# Patient Record
Sex: FEMALE | Race: BLACK OR AFRICAN AMERICAN | NOT HISPANIC OR LATINO | URBAN - METROPOLITAN AREA
[De-identification: names, ages, dates, MRNs, and addresses within clinical notes are randomized per-mention and may not be internally consistent; named-entity substitution may affect disease eponyms.]

---

## 2017-05-04 ENCOUNTER — OUTPATIENT (OUTPATIENT)
Dept: OUTPATIENT SERVICES | Facility: HOSPITAL | Age: 77
LOS: 1 days | Discharge: HOME | End: 2017-05-04

## 2017-05-30 PROBLEM — Z00.00 ENCOUNTER FOR PREVENTIVE HEALTH EXAMINATION: Status: ACTIVE | Noted: 2017-05-30

## 2017-06-28 ENCOUNTER — OUTPATIENT (OUTPATIENT)
Dept: OUTPATIENT SERVICES | Facility: HOSPITAL | Age: 77
LOS: 1 days | Discharge: HOME | End: 2017-06-28

## 2017-06-28 DIAGNOSIS — I10 ESSENTIAL (PRIMARY) HYPERTENSION: ICD-10-CM

## 2017-06-28 DIAGNOSIS — I25.10 ATHEROSCLEROTIC HEART DISEASE OF NATIVE CORONARY ARTERY WITHOUT ANGINA PECTORIS: ICD-10-CM

## 2017-06-28 DIAGNOSIS — I50.9 HEART FAILURE, UNSPECIFIED: ICD-10-CM

## 2017-06-28 DIAGNOSIS — Z01.21 ENCOUNTER FOR DENTAL EXAMINATION AND CLEANING WITH ABNORMAL FINDINGS: ICD-10-CM

## 2017-06-30 DIAGNOSIS — K02.53 DENTAL CARIES ON PIT AND FISSURE SURFACE PENETRATING INTO PULP: ICD-10-CM

## 2017-07-05 ENCOUNTER — OUTPATIENT (OUTPATIENT)
Dept: OUTPATIENT SERVICES | Facility: HOSPITAL | Age: 77
LOS: 1 days | Discharge: HOME | End: 2017-07-05

## 2017-07-05 DIAGNOSIS — I50.9 HEART FAILURE, UNSPECIFIED: ICD-10-CM

## 2017-07-05 DIAGNOSIS — I10 ESSENTIAL (PRIMARY) HYPERTENSION: ICD-10-CM

## 2017-07-05 DIAGNOSIS — I25.10 ATHEROSCLEROTIC HEART DISEASE OF NATIVE CORONARY ARTERY WITHOUT ANGINA PECTORIS: ICD-10-CM

## 2018-02-10 ENCOUNTER — EMERGENCY (EMERGENCY)
Facility: HOSPITAL | Age: 78
LOS: 1 days | Discharge: LEFT BEFORE DISCHARGE | End: 2018-02-10
Attending: EMERGENCY MEDICINE | Admitting: INTERNAL MEDICINE

## 2018-02-10 VITALS
OXYGEN SATURATION: 95 % | TEMPERATURE: 100 F | HEART RATE: 70 BPM | DIASTOLIC BLOOD PRESSURE: 67 MMHG | RESPIRATION RATE: 18 BRPM | SYSTOLIC BLOOD PRESSURE: 102 MMHG

## 2018-02-10 VITALS
DIASTOLIC BLOOD PRESSURE: 67 MMHG | RESPIRATION RATE: 17 BRPM | OXYGEN SATURATION: 97 % | HEART RATE: 69 BPM | TEMPERATURE: 98 F | SYSTOLIC BLOOD PRESSURE: 133 MMHG

## 2018-02-10 DIAGNOSIS — E78.5 HYPERLIPIDEMIA, UNSPECIFIED: ICD-10-CM

## 2018-02-10 DIAGNOSIS — Z87.891 PERSONAL HISTORY OF NICOTINE DEPENDENCE: ICD-10-CM

## 2018-02-10 DIAGNOSIS — J11.1 INFLUENZA DUE TO UNIDENTIFIED INFLUENZA VIRUS WITH OTHER RESPIRATORY MANIFESTATIONS: ICD-10-CM

## 2018-02-10 DIAGNOSIS — R50.9 FEVER, UNSPECIFIED: ICD-10-CM

## 2018-02-10 DIAGNOSIS — I11.0 HYPERTENSIVE HEART DISEASE WITH HEART FAILURE: ICD-10-CM

## 2018-02-10 DIAGNOSIS — J44.9 CHRONIC OBSTRUCTIVE PULMONARY DISEASE, UNSPECIFIED: ICD-10-CM

## 2018-02-10 DIAGNOSIS — R42 DIZZINESS AND GIDDINESS: ICD-10-CM

## 2018-02-10 DIAGNOSIS — R19.7 DIARRHEA, UNSPECIFIED: ICD-10-CM

## 2018-02-10 DIAGNOSIS — I50.9 HEART FAILURE, UNSPECIFIED: ICD-10-CM

## 2018-02-10 DIAGNOSIS — E87.6 HYPOKALEMIA: ICD-10-CM

## 2018-02-10 LAB
ANION GAP SERPL CALC-SCNC: 9 MMOL/L — SIGNIFICANT CHANGE UP (ref 7–14)
APTT BLD: 33.6 SEC — SIGNIFICANT CHANGE UP (ref 27–39.2)
B-TYPE NATRIURETIC PEPTIDE BNP RESULT: 37 PG/ML — SIGNIFICANT CHANGE UP (ref 0–99)
BASOPHILS # BLD AUTO: 0.02 K/UL — SIGNIFICANT CHANGE UP (ref 0–0.2)
BASOPHILS NFR BLD AUTO: 0.8 % — SIGNIFICANT CHANGE UP (ref 0–1)
BUN SERPL-MCNC: 17 MG/DL — SIGNIFICANT CHANGE UP (ref 10–20)
CALCIUM SERPL-MCNC: 8.3 MG/DL — LOW (ref 8.5–10.1)
CHLORIDE SERPL-SCNC: 97 MMOL/L — LOW (ref 98–110)
CK MB CFR SERPL CALC: 2.4 NG/ML — SIGNIFICANT CHANGE UP (ref 0.6–6.3)
CO2 SERPL-SCNC: 28 MMOL/L — SIGNIFICANT CHANGE UP (ref 17–32)
CREAT SERPL-MCNC: 1.3 MG/DL — SIGNIFICANT CHANGE UP (ref 0.7–1.5)
EOSINOPHIL # BLD AUTO: 0.01 K/UL — SIGNIFICANT CHANGE UP (ref 0–0.7)
EOSINOPHIL NFR BLD AUTO: 0.4 % — SIGNIFICANT CHANGE UP (ref 0–8)
FLU A RESULT: NEGATIVE — SIGNIFICANT CHANGE UP
FLU A RESULT: NEGATIVE — SIGNIFICANT CHANGE UP
FLUAV AG NPH QL: NEGATIVE — SIGNIFICANT CHANGE UP
FLUBV AG NPH QL: POSITIVE
GLUCOSE SERPL-MCNC: 137 MG/DL — HIGH (ref 70–110)
HCT VFR BLD CALC: 32.1 % — LOW (ref 37–47)
HGB BLD-MCNC: 10.5 G/DL — LOW (ref 14–18)
IMM GRANULOCYTES NFR BLD AUTO: 0.4 % — HIGH (ref 0.1–0.3)
INR BLD: 1.04 RATIO — SIGNIFICANT CHANGE UP (ref 0.65–1.3)
LYMPHOCYTES # BLD AUTO: 1.26 K/UL — SIGNIFICANT CHANGE UP (ref 1.2–3.4)
LYMPHOCYTES # BLD AUTO: 51 % — SIGNIFICANT CHANGE UP (ref 20.5–51.1)
MCHC RBC-ENTMCNC: 29.3 PG — SIGNIFICANT CHANGE UP (ref 27–31)
MCHC RBC-ENTMCNC: 32.7 G/DL — SIGNIFICANT CHANGE UP (ref 32–37)
MCV RBC AUTO: 89.7 FL — SIGNIFICANT CHANGE UP (ref 81–91)
MONOCYTES # BLD AUTO: 0.43 K/UL — SIGNIFICANT CHANGE UP (ref 0.1–0.6)
MONOCYTES NFR BLD AUTO: 17.4 % — HIGH (ref 1.7–9.3)
NEUTROPHILS # BLD AUTO: 0.74 K/UL — LOW (ref 1.4–6.5)
NEUTROPHILS NFR BLD AUTO: 30 % — LOW (ref 42.2–75.2)
PLATELET # BLD AUTO: 153 K/UL — SIGNIFICANT CHANGE UP (ref 130–400)
POTASSIUM SERPL-MCNC: 2.8 MMOL/L — LOW (ref 3.5–5)
POTASSIUM SERPL-SCNC: 2.8 MMOL/L — LOW (ref 3.5–5)
PROTHROM AB SERPL-ACNC: 11.2 SEC — SIGNIFICANT CHANGE UP (ref 9.95–12.87)
RBC # BLD: 3.58 M/UL — LOW (ref 4.2–5.4)
RBC # FLD: 13.5 % — SIGNIFICANT CHANGE UP (ref 11.5–14.5)
SODIUM SERPL-SCNC: 134 MMOL/L — LOW (ref 135–146)
TROPONIN I SERPL-MCNC: 0.05 NG/ML — SIGNIFICANT CHANGE UP (ref 0–0.05)
WBC # BLD: 2.47 K/UL — LOW (ref 4.8–10.8)
WBC # FLD AUTO: 2.47 K/UL — LOW (ref 4.8–10.8)

## 2018-02-10 RX ORDER — IPRATROPIUM/ALBUTEROL SULFATE 18-103MCG
3 AEROSOL WITH ADAPTER (GRAM) INHALATION ONCE
Qty: 0 | Refills: 0 | Status: COMPLETED | OUTPATIENT
Start: 2018-02-10 | End: 2018-02-10

## 2018-02-10 RX ORDER — POTASSIUM CHLORIDE 20 MEQ
20 PACKET (EA) ORAL ONCE
Qty: 0 | Refills: 0 | Status: COMPLETED | OUTPATIENT
Start: 2018-02-10 | End: 2018-02-10

## 2018-02-10 RX ORDER — SODIUM CHLORIDE 9 MG/ML
1000 INJECTION INTRAMUSCULAR; INTRAVENOUS; SUBCUTANEOUS ONCE
Qty: 0 | Refills: 0 | Status: COMPLETED | OUTPATIENT
Start: 2018-02-10 | End: 2018-02-10

## 2018-02-10 RX ORDER — POTASSIUM CHLORIDE 20 MEQ
40 PACKET (EA) ORAL ONCE
Qty: 0 | Refills: 0 | Status: COMPLETED | OUTPATIENT
Start: 2018-02-10 | End: 2018-02-10

## 2018-02-10 RX ADMIN — Medication 40 MILLIEQUIVALENT(S): at 22:56

## 2018-02-10 RX ADMIN — Medication 75 MILLIGRAM(S): at 22:08

## 2018-02-10 RX ADMIN — Medication 3 MILLILITER(S): at 18:33

## 2018-02-10 NOTE — ED ADULT NURSE NOTE - PMH
CHF (congestive heart failure)    COPD (chronic obstructive pulmonary disease)    HTN (hypertension)

## 2018-02-10 NOTE — ED PROVIDER NOTE - PHYSICAL EXAMINATION
Vital signs reviewed  GENERAL: Patient well appearing, NAD  HEAD: NCAT  EYES: PERRL, EOMI  ENT: MMM  NECK: Supple, non tender  RESPIRATORY: B/L rhonchi. Normal respiratory effort. Speaking full sentences.   CARDIOVASCULAR: Regular rate and rhythm. Normal S1/S2. +murmur (pt states it is known)  ABDOMEN: Soft. Nondistended. Nontender. No guarding or rebound  MUSCULOSKELETAL/EXTREMITIES: Brisk cap refill. 2+ radial pulses. No leg edema.  SKIN:  Warm and dry. No acute rash.  NEURO: AAOx3. No gross FND.  PSYCHIATRIC: Cooperative. Affect appropriate. Insight and judgement normal. Memory intact. Thought normal.

## 2018-02-10 NOTE — ED PROVIDER NOTE - ATTENDING CONTRIBUTION TO CARE
I have personally performed a history and physical exam on this patient and personally directed the management of the patient. Pt is a 76 yo female with PMH of CHF, COPD, leaky valve (? which one?, HTN, high lipids, body aches, cough (productive of white phlegm), fever, loose stool over past 3 days. Felt a little dizzy earlier as well. Not aware of sick contacts, no travel. Has no pedal edema, has chronic orthopnea (since childhood). Denies CP, denies abdomen pain, calf pain, headache, neck pain, rash. No wheezing at home. On exam does have trace crackles at bases, otherwise as per resident note. Will check labs, ekg, xray, nebs, and reassess.

## 2018-02-10 NOTE — ED PROVIDER NOTE - NS ED ROS FT
Constitutional: +fever  Eyes:  No visual changes  ENMT:  No neck pain  Cardiac:  No chest pain, palpitations  Respiratory:  +cough, SOB  GI: +diarrhea. No nausea, vomiting, abdominal pain.  :  No dysuria, hematuria  MS: +myalgia. No back pain. No leg swelling  Neuro: +lightheadedness. No headache  Skin:  No skin rash  Endocrine: No history of thyroid disease or diabetes.  Except as documented in the HPI,  all other systems are negative.

## 2018-02-10 NOTE — ED PROVIDER NOTE - OBJECTIVE STATEMENT
76yo F with PMHx CHF, COPD, "leaky valve", HTN, HLD p/w body aches x6 days, and fever, chills, cough, lightheadedness x3 days. Intermittent SOB. Assoc with loose stools. No recent change in her lasix, denies leg swelling. No flu shot this year. No recent travel or sick contacts. Denies headache, CP, nausea, vomiting, abd pain, dysuria, hematuria, leg swelling.

## 2018-02-11 LAB
ALBUMIN SERPL ELPH-MCNC: 3.4 G/DL — SIGNIFICANT CHANGE UP (ref 3–5.5)
ALP SERPL-CCNC: 44 U/L — SIGNIFICANT CHANGE UP (ref 30–115)
ALT FLD-CCNC: 32 U/L — SIGNIFICANT CHANGE UP (ref 0–41)
AST SERPL-CCNC: 70 U/L — HIGH (ref 0–41)
BILIRUB DIRECT SERPL-MCNC: 0.5 MG/DL — HIGH (ref 0–0.2)
BILIRUB INDIRECT FLD-MCNC: 0.6 MG/DL — SIGNIFICANT CHANGE UP
BILIRUB SERPL-MCNC: 1.1 MG/DL — SIGNIFICANT CHANGE UP (ref 0.2–1.2)
PROT SERPL-MCNC: 7.2 G/DL — SIGNIFICANT CHANGE UP (ref 6–8)

## 2018-02-11 RX ORDER — POTASSIUM CHLORIDE 20 MEQ
40 PACKET (EA) ORAL ONCE
Qty: 0 | Refills: 0 | Status: DISCONTINUED | OUTPATIENT
Start: 2018-02-11 | End: 2018-02-16

## 2018-02-11 RX ADMIN — SODIUM CHLORIDE 1000 MILLILITER(S): 9 INJECTION INTRAMUSCULAR; INTRAVENOUS; SUBCUTANEOUS at 00:03

## 2018-02-11 RX ADMIN — Medication 50 MILLIEQUIVALENT(S): at 00:03

## 2018-02-14 ENCOUNTER — INPATIENT (INPATIENT)
Facility: HOSPITAL | Age: 78
LOS: 1 days | Discharge: HOME | End: 2018-02-16
Attending: INTERNAL MEDICINE | Admitting: INTERNAL MEDICINE

## 2018-02-14 VITALS
DIASTOLIC BLOOD PRESSURE: 94 MMHG | SYSTOLIC BLOOD PRESSURE: 154 MMHG | RESPIRATION RATE: 20 BRPM | TEMPERATURE: 98 F | OXYGEN SATURATION: 97 % | HEART RATE: 74 BPM

## 2018-02-14 DIAGNOSIS — Z98.890 OTHER SPECIFIED POSTPROCEDURAL STATES: Chronic | ICD-10-CM

## 2018-02-14 LAB
ALBUMIN SERPL ELPH-MCNC: 3.4 G/DL — SIGNIFICANT CHANGE UP (ref 3–5.5)
ALP SERPL-CCNC: 47 U/L — SIGNIFICANT CHANGE UP (ref 30–115)
ALT FLD-CCNC: 20 U/L — SIGNIFICANT CHANGE UP (ref 0–41)
ANION GAP SERPL CALC-SCNC: 11 MMOL/L — SIGNIFICANT CHANGE UP (ref 7–14)
APPEARANCE UR: (no result)
AST SERPL-CCNC: 52 U/L — HIGH (ref 0–41)
BACTERIA # UR AUTO: (no result) /HPF
BASOPHILS # BLD AUTO: 0.02 K/UL — SIGNIFICANT CHANGE UP (ref 0–0.2)
BASOPHILS NFR BLD AUTO: 0.3 % — SIGNIFICANT CHANGE UP (ref 0–1)
BILIRUB SERPL-MCNC: 1.5 MG/DL — HIGH (ref 0.2–1.2)
BILIRUB UR-MCNC: NEGATIVE — SIGNIFICANT CHANGE UP
BUN SERPL-MCNC: 8 MG/DL — LOW (ref 10–20)
CALCIUM SERPL-MCNC: 8.8 MG/DL — SIGNIFICANT CHANGE UP (ref 8.5–10.1)
CHLORIDE SERPL-SCNC: 104 MMOL/L — SIGNIFICANT CHANGE UP (ref 98–110)
CO2 SERPL-SCNC: 25 MMOL/L — SIGNIFICANT CHANGE UP (ref 17–32)
COLOR SPEC: YELLOW — SIGNIFICANT CHANGE UP
CREAT SERPL-MCNC: 0.9 MG/DL — SIGNIFICANT CHANGE UP (ref 0.7–1.5)
DIFF PNL FLD: NEGATIVE — SIGNIFICANT CHANGE UP
EOSINOPHIL # BLD AUTO: 0.03 K/UL — SIGNIFICANT CHANGE UP (ref 0–0.7)
EOSINOPHIL NFR BLD AUTO: 0.5 % — SIGNIFICANT CHANGE UP (ref 0–8)
EPI CELLS # UR: (no result) /HPF
GLUCOSE SERPL-MCNC: 96 MG/DL — SIGNIFICANT CHANGE UP (ref 70–110)
GLUCOSE UR QL: NEGATIVE MG/DL — SIGNIFICANT CHANGE UP
HCT VFR BLD CALC: 32.2 % — LOW (ref 37–47)
HGB BLD-MCNC: 10.1 G/DL — LOW (ref 14–18)
IMM GRANULOCYTES NFR BLD AUTO: 0.7 % — HIGH (ref 0.1–0.3)
KETONES UR-MCNC: NEGATIVE — SIGNIFICANT CHANGE UP
LACTATE SERPL-SCNC: 1.4 MMOL/L — SIGNIFICANT CHANGE UP (ref 0.5–2.2)
LEUKOCYTE ESTERASE UR-ACNC: NEGATIVE — SIGNIFICANT CHANGE UP
LYMPHOCYTES # BLD AUTO: 1.47 K/UL — SIGNIFICANT CHANGE UP (ref 1.2–3.4)
LYMPHOCYTES # BLD AUTO: 25 % — SIGNIFICANT CHANGE UP (ref 20.5–51.1)
MCHC RBC-ENTMCNC: 28.9 PG — SIGNIFICANT CHANGE UP (ref 27–31)
MCHC RBC-ENTMCNC: 31.4 G/DL — LOW (ref 32–37)
MCV RBC AUTO: 92 FL — HIGH (ref 81–91)
MONOCYTES # BLD AUTO: 0.53 K/UL — SIGNIFICANT CHANGE UP (ref 0.1–0.6)
MONOCYTES NFR BLD AUTO: 9 % — SIGNIFICANT CHANGE UP (ref 1.7–9.3)
NEUTROPHILS # BLD AUTO: 3.79 K/UL — SIGNIFICANT CHANGE UP (ref 1.4–6.5)
NEUTROPHILS NFR BLD AUTO: 64.5 % — SIGNIFICANT CHANGE UP (ref 42.2–75.2)
NITRITE UR-MCNC: NEGATIVE — SIGNIFICANT CHANGE UP
NRBC # BLD: 0 /100 WBCS — SIGNIFICANT CHANGE UP (ref 0–0)
PH UR: 6 — SIGNIFICANT CHANGE UP (ref 5–8)
PLATELET # BLD AUTO: 230 K/UL — SIGNIFICANT CHANGE UP (ref 130–400)
POTASSIUM SERPL-MCNC: 5.2 MMOL/L — HIGH (ref 3.5–5)
POTASSIUM SERPL-SCNC: 5.2 MMOL/L — HIGH (ref 3.5–5)
PROT SERPL-MCNC: 6.7 G/DL — SIGNIFICANT CHANGE UP (ref 6–8)
PROT UR-MCNC: 30 MG/DL
RBC # BLD: 3.5 M/UL — LOW (ref 4.2–5.4)
RBC # FLD: 14.3 % — SIGNIFICANT CHANGE UP (ref 11.5–14.5)
SODIUM SERPL-SCNC: 140 MMOL/L — SIGNIFICANT CHANGE UP (ref 135–146)
SP GR SPEC: 1.01 — SIGNIFICANT CHANGE UP (ref 1.01–1.03)
UROBILINOGEN FLD QL: 1 MG/DL (ref 0.2–0.2)
WBC # BLD: 5.88 K/UL — SIGNIFICANT CHANGE UP (ref 4.8–10.8)
WBC # FLD AUTO: 5.88 K/UL — SIGNIFICANT CHANGE UP (ref 4.8–10.8)
WBC UR QL: SIGNIFICANT CHANGE UP /HPF

## 2018-02-14 RX ORDER — AZITHROMYCIN 500 MG/1
500 TABLET, FILM COATED ORAL EVERY 24 HOURS
Qty: 0 | Refills: 0 | Status: DISCONTINUED | OUTPATIENT
Start: 2018-02-14 | End: 2018-02-16

## 2018-02-14 RX ORDER — CEFTRIAXONE 500 MG/1
1 INJECTION, POWDER, FOR SOLUTION INTRAMUSCULAR; INTRAVENOUS EVERY 24 HOURS
Qty: 0 | Refills: 0 | Status: DISCONTINUED | OUTPATIENT
Start: 2018-02-14 | End: 2018-02-16

## 2018-02-14 RX ORDER — AMLODIPINE BESYLATE 2.5 MG/1
5 TABLET ORAL DAILY
Qty: 0 | Refills: 0 | Status: DISCONTINUED | OUTPATIENT
Start: 2018-02-14 | End: 2018-02-16

## 2018-02-14 RX ORDER — IPRATROPIUM/ALBUTEROL SULFATE 18-103MCG
3 AEROSOL WITH ADAPTER (GRAM) INHALATION EVERY 4 HOURS
Qty: 0 | Refills: 0 | Status: DISCONTINUED | OUTPATIENT
Start: 2018-02-14 | End: 2018-02-16

## 2018-02-14 RX ORDER — AZITHROMYCIN 500 MG/1
500 TABLET, FILM COATED ORAL ONCE
Qty: 0 | Refills: 0 | Status: COMPLETED | OUTPATIENT
Start: 2018-02-14 | End: 2018-02-14

## 2018-02-14 RX ORDER — METOPROLOL TARTRATE 50 MG
25 TABLET ORAL
Qty: 0 | Refills: 0 | Status: DISCONTINUED | OUTPATIENT
Start: 2018-02-14 | End: 2018-02-16

## 2018-02-14 RX ORDER — CEFTRIAXONE 500 MG/1
1 INJECTION, POWDER, FOR SOLUTION INTRAMUSCULAR; INTRAVENOUS ONCE
Qty: 0 | Refills: 0 | Status: COMPLETED | OUTPATIENT
Start: 2018-02-14 | End: 2018-02-14

## 2018-02-14 RX ORDER — SODIUM CHLORIDE 9 MG/ML
3 INJECTION INTRAMUSCULAR; INTRAVENOUS; SUBCUTANEOUS
Qty: 3 | Refills: 0 | OUTPATIENT
Start: 2018-02-14 | End: 2018-02-20

## 2018-02-14 RX ORDER — LEVOTHYROXINE SODIUM 125 MCG
25 TABLET ORAL DAILY
Qty: 0 | Refills: 0 | Status: DISCONTINUED | OUTPATIENT
Start: 2018-02-14 | End: 2018-02-16

## 2018-02-14 RX ORDER — SODIUM CHLORIDE 9 MG/ML
1000 INJECTION INTRAMUSCULAR; INTRAVENOUS; SUBCUTANEOUS
Qty: 0 | Refills: 0 | Status: DISCONTINUED | OUTPATIENT
Start: 2018-02-14 | End: 2018-02-16

## 2018-02-14 RX ORDER — SODIUM CHLORIDE 9 MG/ML
3 INJECTION INTRAMUSCULAR; INTRAVENOUS; SUBCUTANEOUS ONCE
Qty: 0 | Refills: 0 | Status: COMPLETED | OUTPATIENT
Start: 2018-02-14 | End: 2018-02-14

## 2018-02-14 RX ORDER — ENOXAPARIN SODIUM 100 MG/ML
40 INJECTION SUBCUTANEOUS EVERY 24 HOURS
Qty: 0 | Refills: 0 | Status: DISCONTINUED | OUTPATIENT
Start: 2018-02-14 | End: 2018-02-15

## 2018-02-14 RX ORDER — ALBUTEROL 90 UG/1
2.5 AEROSOL, METERED ORAL EVERY 6 HOURS
Qty: 0 | Refills: 0 | Status: DISCONTINUED | OUTPATIENT
Start: 2018-02-14 | End: 2018-02-16

## 2018-02-14 RX ORDER — FUROSEMIDE 40 MG
20 TABLET ORAL DAILY
Qty: 0 | Refills: 0 | Status: DISCONTINUED | OUTPATIENT
Start: 2018-02-14 | End: 2018-02-16

## 2018-02-14 RX ORDER — BUDESONIDE AND FORMOTEROL FUMARATE DIHYDRATE 160; 4.5 UG/1; UG/1
2 AEROSOL RESPIRATORY (INHALATION)
Qty: 0 | Refills: 0 | Status: DISCONTINUED | OUTPATIENT
Start: 2018-02-14 | End: 2018-02-16

## 2018-02-14 RX ORDER — ATORVASTATIN CALCIUM 80 MG/1
20 TABLET, FILM COATED ORAL AT BEDTIME
Qty: 0 | Refills: 0 | Status: DISCONTINUED | OUTPATIENT
Start: 2018-02-14 | End: 2018-02-16

## 2018-02-14 RX ORDER — ENOXAPARIN SODIUM 100 MG/ML
40 INJECTION SUBCUTANEOUS EVERY 24 HOURS
Qty: 0 | Refills: 0 | Status: DISCONTINUED | OUTPATIENT
Start: 2018-02-14 | End: 2018-02-16

## 2018-02-14 RX ORDER — SODIUM CHLORIDE 9 MG/ML
500 INJECTION INTRAMUSCULAR; INTRAVENOUS; SUBCUTANEOUS
Qty: 0 | Refills: 0 | Status: DISCONTINUED | OUTPATIENT
Start: 2018-02-14 | End: 2018-02-14

## 2018-02-14 RX ADMIN — CEFTRIAXONE 100 GRAM(S): 500 INJECTION, POWDER, FOR SOLUTION INTRAMUSCULAR; INTRAVENOUS at 18:27

## 2018-02-14 RX ADMIN — SODIUM CHLORIDE 500 MILLILITER(S): 9 INJECTION INTRAMUSCULAR; INTRAVENOUS; SUBCUTANEOUS at 22:07

## 2018-02-14 RX ADMIN — AZITHROMYCIN 255 MILLIGRAM(S): 500 TABLET, FILM COATED ORAL at 19:30

## 2018-02-14 RX ADMIN — SODIUM CHLORIDE 2000 MILLILITER(S): 9 INJECTION INTRAMUSCULAR; INTRAVENOUS; SUBCUTANEOUS at 18:28

## 2018-02-14 RX ADMIN — SODIUM CHLORIDE 3 MILLILITER(S): 9 INJECTION INTRAMUSCULAR; INTRAVENOUS; SUBCUTANEOUS at 18:28

## 2018-02-14 NOTE — H&P ADULT - NSHPPHYSICALEXAM_GEN_ALL_CORE
Constitutional: non-toxic,  not in acute distress, but appears anxious.   HEENT: eomi,  wnl  Respiratory:  breath sounds b/l;  scattered crackles and wheezing b/l   Cardiovascular:  s1, s2,  regular rate, +systolic murmur.   Gastrointestinal:  nontender, nondistended, +bowel sounds  Extremities: no edema b/l le  Neurological: nonfocal; wnl, aaox3

## 2018-02-14 NOTE — ED PROVIDER NOTE - OBJECTIVE STATEMENT
77 female here for feeling odd sensations today, states felt like she was unable to control herself, noticed it after arising to go to the bathroom last night.     Has been recently diagnosed by [facility] ED with influenza via swab.  Has been taking tamiflu as directed and albuterol 3x/day.     No new fevers cough chills URI symptoms.

## 2018-02-14 NOTE — H&P ADULT - HISTORY OF PRESENT ILLNESS
anxiety attack  mm aches. runny nose, dx with flu recently in ed,  d/c'ed with tamiflu. took 1 day of tamiflu at home.   came back bc.    sob,      no chest pain / palpitations / n /v / stomach pain (excpet for gas).        productive white phlem. (nonbloody).    ros: dry mouth.  fevers 77y F w pmh listed and recently in ed with muscle aches / runny nose and found to be influenza positive and d/c'ed with tamiflu,  pw acute feeling of sob / anxiety.  Pt is still having productive cough (white sputum), dry mouth,  subjective fevers at home.   Denied any chest pain / palpitations / n /v / stomach pain (excpet for gas).

## 2018-02-14 NOTE — H&P ADULT - ATTENDING COMMENTS
PATIENT SEEN AND EXAMINED INDEPENDENTLY AGREE WITH PLAN OF RESIDENT.  VITAL SIGNS (Last 24 hrs):  T(C): 36.3 (02-15-18 @ 08:29), Max: 36.6 (02-15-18 @ 01:39)  HR: 57 (02-15-18 @ 08:29) (57 - 85)  BP: 130/77 (02-15-18 @ 08:29) (119/81 - 169/94)  RR: 18 (02-15-18 @ 08:29) (18 - 19)  SpO2: 97% (02-15-18 @ 08:29) (96% - 97%)  Wt(kg): --  Daily     Daily     I&O's Summary                        9.2    5.38  )-----------( 228      ( 15 Feb 2018 08:09 )             28.7   02-15    137  |  101  |  5<L>  ----------------------------<  141<H>  3.2<L>   |  28  |  0.8    Ca    8.4<L>      15 Feb 2018 08:09  Phos  3.4     02-15  Mg     1.7     02-15    TPro  6.0  /  Alb  3.2  /  TBili  0.7  /  DBili  x   /  AST  22  /  ALT  21  /  AlkPhos  45  02-15  cxr- INFILTRATE ON RT LL.  ON EXAM- aaoX3  CHEST-B/L CLEAR  CVS-S1 AND S2 CLEAR  ABDOMEN-SOFT,BS+  no edema  Assessment and Plan-  FLu positive on feb 10th and probably has completed tamiflu.  Pneumonia- patient feels ok will repeat CXR and 1 more day of iv antibiotics as she is a heart patient.  Severe MR- planning surgery.

## 2018-02-14 NOTE — H&P ADULT - PMH
CHF (congestive heart failure)    COPD (chronic obstructive pulmonary disease)    HTN (hypertension)    Severe mitral regurgitation

## 2018-02-14 NOTE — H&P ADULT - ASSESSMENT
77y female with PMH listed pw cc worsening sob / cough / feeling of anxiety.  Found to have RLL PNA.     PLAN :    RLL PNA / COPD EXACERBATION  -- o2, nebs, steroids (taper upon improvement)  -- check bcx, sputum cx  -- check tte (low suspicion of pe)  -- iv abx:  rocephin + azithromycin  -- cw 5 day of tamiflu (4d left)      SEVERE MITRAL REGURGITATION  -- pt with plans to have mvr as o/p.        HTN / HF (NOT IN EXACERBATION)  --  cw home regimen      DVT PPX  DISPO: home 77y female with PMH listed pw cc worsening sob / cough / feeling of anxiety.  Found to have RLL PNA.     PLAN :    RLL PNA / COPD EXACERBATION  -- o2, nebs, steroids (taper upon improvement)  -- check bcx, sputum cx  -- check ddimer (low suspicion of pe)  -- iv abx:  rocephin + azithromycin  -- cw 5 day of tamiflu (4d left)      SEVERE MITRAL REGURGITATION  -- pt with plans to have mvr as o/p.        HTN / HF (NOT IN EXACERBATION)  --  cw home regimen      DVT PPX  DISPO: home 77y female with PMH listed pw cc worsening sob / cough / feeling of anxiety.  Found to have RLL PNA.     PLAN :    RLL PNA / COPD EXACERBATION  -- o2, nebs, steroids (taper upon improvement)  -- check bcx, sputum cx  -- check ddimer (low suspicion of pe)  -- iv abx:  rocephin + azithromycin  -- cw 5 day of tamiflu (4d left)      HYPERKALEMIA (5.2,  HEMOLYZED SAMPLE)  --  recheck bmp in am.   --  if nonhemolyzed sample elevated,  hold ace      SEVERE MITRAL REGURGITATION  -- pt with plans to have mvr as o/p.        HTN / HF (NOT IN EXACERBATION)  --  cw home regimen      DVT PPX  DISPO: home

## 2018-02-15 LAB
ALBUMIN SERPL ELPH-MCNC: 3.2 G/DL — SIGNIFICANT CHANGE UP (ref 3–5.5)
ALP SERPL-CCNC: 45 U/L — SIGNIFICANT CHANGE UP (ref 30–115)
ALT FLD-CCNC: 21 U/L — SIGNIFICANT CHANGE UP (ref 0–41)
ANION GAP SERPL CALC-SCNC: 8 MMOL/L — SIGNIFICANT CHANGE UP (ref 7–14)
AST SERPL-CCNC: 22 U/L — SIGNIFICANT CHANGE UP (ref 0–41)
BASOPHILS # BLD AUTO: 0.03 K/UL — SIGNIFICANT CHANGE UP (ref 0–0.2)
BASOPHILS NFR BLD AUTO: 0.6 % — SIGNIFICANT CHANGE UP (ref 0–1)
BILIRUB SERPL-MCNC: 0.7 MG/DL — SIGNIFICANT CHANGE UP (ref 0.2–1.2)
BUN SERPL-MCNC: 5 MG/DL — LOW (ref 10–20)
CALCIUM SERPL-MCNC: 8.4 MG/DL — LOW (ref 8.5–10.1)
CHLORIDE SERPL-SCNC: 101 MMOL/L — SIGNIFICANT CHANGE UP (ref 98–110)
CO2 SERPL-SCNC: 28 MMOL/L — SIGNIFICANT CHANGE UP (ref 17–32)
CREAT SERPL-MCNC: 0.8 MG/DL — SIGNIFICANT CHANGE UP (ref 0.7–1.5)
D DIMER BLD IA.RAPID-MCNC: 423 NG/ML DDU — HIGH (ref 0–230)
EOSINOPHIL # BLD AUTO: 0.13 K/UL — SIGNIFICANT CHANGE UP (ref 0–0.7)
EOSINOPHIL NFR BLD AUTO: 2.4 % — SIGNIFICANT CHANGE UP (ref 0–8)
GLUCOSE SERPL-MCNC: 141 MG/DL — HIGH (ref 70–110)
HCT VFR BLD CALC: 28.7 % — LOW (ref 37–47)
HGB BLD-MCNC: 9.2 G/DL — LOW (ref 14–18)
IMM GRANULOCYTES NFR BLD AUTO: 0.4 % — HIGH (ref 0.1–0.3)
LACTATE SERPL-SCNC: 0.8 MMOL/L — SIGNIFICANT CHANGE UP (ref 0.5–2.2)
LYMPHOCYTES # BLD AUTO: 1.72 K/UL — SIGNIFICANT CHANGE UP (ref 1.2–3.4)
LYMPHOCYTES # BLD AUTO: 32 % — SIGNIFICANT CHANGE UP (ref 20.5–51.1)
MAGNESIUM SERPL-MCNC: 1.7 MG/DL — LOW (ref 1.8–2.4)
MCHC RBC-ENTMCNC: 29.6 PG — SIGNIFICANT CHANGE UP (ref 27–31)
MCHC RBC-ENTMCNC: 32.1 G/DL — SIGNIFICANT CHANGE UP (ref 32–37)
MCV RBC AUTO: 92.3 FL — HIGH (ref 81–91)
MONOCYTES # BLD AUTO: 0.65 K/UL — HIGH (ref 0.1–0.6)
MONOCYTES NFR BLD AUTO: 12.1 % — HIGH (ref 1.7–9.3)
NEUTROPHILS # BLD AUTO: 2.83 K/UL — SIGNIFICANT CHANGE UP (ref 1.4–6.5)
NEUTROPHILS NFR BLD AUTO: 52.5 % — SIGNIFICANT CHANGE UP (ref 42.2–75.2)
PHOSPHATE SERPL-MCNC: 3.4 MG/DL — SIGNIFICANT CHANGE UP (ref 2.1–4.9)
PLATELET # BLD AUTO: 228 K/UL — SIGNIFICANT CHANGE UP (ref 130–400)
POTASSIUM SERPL-MCNC: 3.2 MMOL/L — LOW (ref 3.5–5)
POTASSIUM SERPL-SCNC: 3.2 MMOL/L — LOW (ref 3.5–5)
PROT SERPL-MCNC: 6 G/DL — SIGNIFICANT CHANGE UP (ref 6–8)
RBC # BLD: 3.11 M/UL — LOW (ref 4.2–5.4)
RBC # FLD: 14.4 % — SIGNIFICANT CHANGE UP (ref 11.5–14.5)
SODIUM SERPL-SCNC: 137 MMOL/L — SIGNIFICANT CHANGE UP (ref 135–146)
WBC # BLD: 5.38 K/UL — SIGNIFICANT CHANGE UP (ref 4.8–10.8)
WBC # FLD AUTO: 5.38 K/UL — SIGNIFICANT CHANGE UP (ref 4.8–10.8)

## 2018-02-15 RX ORDER — POTASSIUM CHLORIDE 20 MEQ
40 PACKET (EA) ORAL ONCE
Qty: 0 | Refills: 0 | Status: COMPLETED | OUTPATIENT
Start: 2018-02-15 | End: 2018-02-15

## 2018-02-15 RX ORDER — MAGNESIUM SULFATE 500 MG/ML
1 VIAL (ML) INJECTION ONCE
Qty: 0 | Refills: 0 | Status: COMPLETED | OUTPATIENT
Start: 2018-02-15 | End: 2018-02-15

## 2018-02-15 RX ADMIN — Medication 40 MILLIEQUIVALENT(S): at 20:58

## 2018-02-15 RX ADMIN — CEFTRIAXONE 100 GRAM(S): 500 INJECTION, POWDER, FOR SOLUTION INTRAMUSCULAR; INTRAVENOUS at 11:12

## 2018-02-15 RX ADMIN — Medication 25 MILLIGRAM(S): at 07:21

## 2018-02-15 RX ADMIN — AZITHROMYCIN 255 MILLIGRAM(S): 500 TABLET, FILM COATED ORAL at 07:22

## 2018-02-15 RX ADMIN — AMLODIPINE BESYLATE 5 MILLIGRAM(S): 2.5 TABLET ORAL at 07:19

## 2018-02-15 RX ADMIN — Medication 3 MILLILITER(S): at 18:29

## 2018-02-15 RX ADMIN — ALBUTEROL 2.5 MILLIGRAM(S): 90 AEROSOL, METERED ORAL at 11:15

## 2018-02-15 RX ADMIN — Medication 3 MILLILITER(S): at 05:20

## 2018-02-15 RX ADMIN — ENOXAPARIN SODIUM 40 MILLIGRAM(S): 100 INJECTION SUBCUTANEOUS at 07:18

## 2018-02-15 RX ADMIN — Medication 75 MILLIGRAM(S): at 18:28

## 2018-02-15 RX ADMIN — Medication 3 MILLILITER(S): at 11:14

## 2018-02-15 RX ADMIN — Medication 25 MILLIGRAM(S): at 18:28

## 2018-02-15 RX ADMIN — Medication 50 MILLIGRAM(S): at 07:20

## 2018-02-15 RX ADMIN — ALBUTEROL 2.5 MILLIGRAM(S): 90 AEROSOL, METERED ORAL at 18:30

## 2018-02-15 RX ADMIN — Medication 100 GRAM(S): at 21:28

## 2018-02-15 RX ADMIN — Medication 25 MICROGRAM(S): at 07:20

## 2018-02-15 RX ADMIN — Medication 75 MILLIGRAM(S): at 07:20

## 2018-02-15 RX ADMIN — Medication 20 MILLIGRAM(S): at 07:19

## 2018-02-15 RX ADMIN — ATORVASTATIN CALCIUM 20 MILLIGRAM(S): 80 TABLET, FILM COATED ORAL at 21:28

## 2018-02-15 NOTE — PROGRESS NOTE ADULT - SUBJECTIVE AND OBJECTIVE BOX
SCOTTY HANNON, female, 77y (40),   MRN-[id number]  Admit Date: 18 (1d)    Chief Complaint  Patient is a 77y old  Female who presents with a chief complaint of SOB (2018 22:09)      Past Medical and Surgical History  PAST MEDICAL & SURGICAL HISTORY:  Severe mitral regurgitation  COPD (chronic obstructive pulmonary disease)  CHF (congestive heart failure)  HTN (hypertension)  History of cholecystectomy      Current Medications:  MEDICATIONS  (STANDING):  ALBUTerol    0.083% 2.5 milliGRAM(s) Nebulizer every 6 hours  ALBUTerol/ipratropium for Nebulization 3 milliLiter(s) Nebulizer every 4 hours  amLODIPine   Tablet 5 milliGRAM(s) Oral daily  atorvastatin 20 milliGRAM(s) Oral at bedtime  azithromycin  IVPB 500 milliGRAM(s) IV Intermittent every 24 hours  buDESOnide  80 MICROgram(s)/formoterol 4.5 MICROgram(s) Inhaler 2 Puff(s) Inhalation two times a day  cefTRIAXone   IVPB 1 Gram(s) IV Intermittent every 24 hours  enoxaparin Injectable 40 milliGRAM(s) SubCutaneous every 24 hours  furosemide    Tablet 20 milliGRAM(s) Oral daily  levothyroxine 25 MICROGram(s) Oral daily  metoprolol     tartrate 25 milliGRAM(s) Oral two times a day  oseltamivir 75 milliGRAM(s) Oral two times a day  potassium chloride    Tablet ER 40 milliEquivalent(s) Oral once  sodium chloride 0.9%. 1000 milliLiter(s) (500 mL/Hr) IV Continuous <Continuous>    MEDICATIONS  (PRN):      Interval History:  No acute events overnight. No complaints at this time.    Vital Signs:  T(F): 97.3 (02-15-18 @ 08:29), Max: 97.9 (18 @ 13:20)  HR: 57 (02-15-18 @ 08:29) (57 - 85)  BP: 130/77 (02-15-18 @ 08:29) (119/81 - 169/94)  RR: 18 (02-15-18 @ 08:29) (18 - 20)  SpO2: 97% (02-15-18 @ 08:29) (96% - 97%)T(F): 97.3 (02-15-18 @ 08:29), Max: 97.8 (02-15-18 @ 01:39)  HR: 57 (02-15-18 @ 08:29) (57 - 85)  BP: 130/77 (02-15-18 @ 08:29) (119/81 - 169/94)  RR: 18 (02-15-18 @ 08:29) (18 - 19)  SpO2: 97% (02-15-18 @ 08:29) (96% - 97%)  CAPILLARY BLOOD GLUCOSE          Physical Exam:  General: Not in distress.   HEENT: Moist mucus membranes. PERRLA.  Cardio: Regular rate and rhythm, S1, S2, no murmur, rub, or gallop.  Pulm: Rales bilaterally  Abdomen: Soft, non-tender, non-distended. Normoactive bowel sounds  Extremities: No cyanosis or edema bilaterally. No calf tenderness to palpation  Neuro: A&O x3. No focal deficits. CN II - XII grossly intact.    Labs and Imaging:  CBC Full  -  ( 15 Feb 2018 08:09 )  WBC Count : 5.38 K/uL  Hemoglobin : 9.2 g/dL  Hematocrit : 28.7 %  Platelet Count - Automated : 228 K/uL  Mean Cell Volume : 92.3 fL  Mean Cell Hemoglobin : 29.6 pg  Mean Cell Hemoglobin Concentration : 32.1 g/dL  Auto Neutrophil # : 2.83 K/uL  Auto Lymphocyte # : 1.72 K/uL  Auto Monocyte # : 0.65 K/uL  Auto Eosinophil # : 0.13 K/uL  Auto Basophil # : 0.03 K/uL  Auto Neutrophil % : 52.5 %  Auto Lymphocyte % : 32.0 %  Auto Monocyte % : 12.1 %  Auto Eosinophil % : 2.4 %  Auto Basophil % : 0.6 %    RDW: 14.4  14.3      BMP: 02-15-18 @ 08:09  137 | 101 | 5    -----------------< 141  3.2  | 28 | 0.8  eGFR(AA): 81, eGFR (non-AA): 70  Ca 8.4, Mg 1.7, P 3.4  BMP: 18 @ 17:12  140 | 104 | 8    -----------------< 96  5.2  | 25 | 0.9  eGFR(AA): 73, eGFR (non-AA): 63  Ca 8.8, Mg --, P --    LFTs: 02-15-18 @ 08:09  TP  6.0  | 3.2 Alb   ---------------  TB  0.7  | --  DB   ---------------  ALT 21   | 22  AST          ^        45          ALK  LFTs: 18 @ 17:12  TP  6.7  | 3.4 Alb   ---------------  TB  1.5  | --  DB   ---------------  ALT 20   | 52  AST          ^        47          ALK      LFTs: 02-15-18 @ 08:09  Ca  8.4  | 22 AST   -----------------  TP  6.0  | 21 ALT  -----------------  Alb 3.2  | 45 ALK          ^        0.7         TB  LFTs: 18 @ 17:12  Ca  8.8  | 52 AST   -----------------  TP  6.7  | 20 ALT  -----------------  Alb 3.4  | 47 ALK          ^        1.5         TB      Cardiac Enzymes:    Urinalysis:  Urinalysis Basic - ( 2018 17:12 )    Color: Yellow / Appearance: Cloudy / S.015 / pH: x  Gluc: x / Ketone: Negative  / Bili: Negative / Urobili: 1.0 mg/dL   Blood: x / Protein: 30 mg/dL / Nitrite: Negative   Leuk Esterase: Negative / RBC: x / WBC 1-2 /HPF   Sq Epi: x / Non Sq Epi: Moderate /HPF / Bacteria: Few /HPF      Cultures:      Home Medications:  Home Medications:  albuterol 2.5 mg/3 mL (0.083%) inhalation solution: 3 milliliter(s) inhaled every 2 hours, As Needed (2018 16:12)  AMLODIPINE   TAB 5MG:  (2018 22:04)  ATORVASTATIN TAB 20MG:  (2018 22:04)  ENALAPRIL    TAB 10MG:  (2018 22:04)  furosemide 20 mg oral tablet: 20 milligram(s) orally once a day (2018 16:12)  LEVOTHYROXIN TAB 25MCG:  (2018 22:04)  metoprolol tartrate 25 mg oral tablet: 1 tab(s) orally 2 times a day (2018 16:12)      Assessment:  77y female with PMH listed presented for .  Found to have Pneumonia  Influenza      Plan:  Pneumonia  Influenza      Gilberto Peter MD.  Date/Time: 02-15-18 @ 14:42 SCOTTY HANNON, female, 77y (40),   MRN-[id number]  Admit Date: 18 (1d)    Chief Complaint  Patient is a 77y old  Female who presents with a chief complaint of SOB (2018 22:09)      Past Medical and Surgical History  PAST MEDICAL & SURGICAL HISTORY:  Severe mitral regurgitation  COPD (chronic obstructive pulmonary disease)  CHF (congestive heart failure)  HTN (hypertension)  History of cholecystectomy      Current Medications:  MEDICATIONS  (STANDING):  ALBUTerol    0.083% 2.5 milliGRAM(s) Nebulizer every 6 hours  ALBUTerol/ipratropium for Nebulization 3 milliLiter(s) Nebulizer every 4 hours  amLODIPine   Tablet 5 milliGRAM(s) Oral daily  atorvastatin 20 milliGRAM(s) Oral at bedtime  azithromycin  IVPB 500 milliGRAM(s) IV Intermittent every 24 hours  buDESOnide  80 MICROgram(s)/formoterol 4.5 MICROgram(s) Inhaler 2 Puff(s) Inhalation two times a day  cefTRIAXone   IVPB 1 Gram(s) IV Intermittent every 24 hours  enoxaparin Injectable 40 milliGRAM(s) SubCutaneous every 24 hours  furosemide    Tablet 20 milliGRAM(s) Oral daily  levothyroxine 25 MICROGram(s) Oral daily  metoprolol     tartrate 25 milliGRAM(s) Oral two times a day  oseltamivir 75 milliGRAM(s) Oral two times a day  potassium chloride    Tablet ER 40 milliEquivalent(s) Oral once  sodium chloride 0.9%. 1000 milliLiter(s) (500 mL/Hr) IV Continuous <Continuous>    MEDICATIONS  (PRN):      Interval History:  No acute events overnight. No complaints at this time.    Vital Signs:  T(F): 97.3 (02-15-18 @ 08:29), Max: 97.9 (18 @ 13:20)  HR: 57 (02-15-18 @ 08:29) (57 - 85)  BP: 130/77 (02-15-18 @ 08:29) (119/81 - 169/94)  RR: 18 (02-15-18 @ 08:29) (18 - 20)  SpO2: 97% (02-15-18 @ 08:29) (96% - 97%)T(F): 97.3 (02-15-18 @ 08:29), Max: 97.8 (02-15-18 @ 01:39)  HR: 57 (02-15-18 @ 08:29) (57 - 85)  BP: 130/77 (02-15-18 @ 08:29) (119/81 - 169/94)  RR: 18 (02-15-18 @ 08:29) (18 - 19)  SpO2: 97% (02-15-18 @ 08:29) (96% - 97%)  CAPILLARY BLOOD GLUCOSE          Physical Exam:  General: Not in distress.   HEENT: Moist mucus membranes. PERRLA.  Cardio: Regular rate and rhythm, S1, S2, no murmur, rub, or gallop.  Pulm: Rales bilaterally  Abdomen: Soft, non-tender, non-distended. Normoactive bowel sounds  Extremities: No cyanosis or edema bilaterally. No calf tenderness to palpation  Neuro: A&O x3. No focal deficits. CN II - XII grossly intact.    Labs and Imaging:  CBC Full  -  ( 15 Feb 2018 08:09 )  WBC Count : 5.38 K/uL  Hemoglobin : 9.2 g/dL  Hematocrit : 28.7 %  Platelet Count - Automated : 228 K/uL  Mean Cell Volume : 92.3 fL  Mean Cell Hemoglobin : 29.6 pg  Mean Cell Hemoglobin Concentration : 32.1 g/dL  Auto Neutrophil # : 2.83 K/uL  Auto Lymphocyte # : 1.72 K/uL  Auto Monocyte # : 0.65 K/uL  Auto Eosinophil # : 0.13 K/uL  Auto Basophil # : 0.03 K/uL  Auto Neutrophil % : 52.5 %  Auto Lymphocyte % : 32.0 %  Auto Monocyte % : 12.1 %  Auto Eosinophil % : 2.4 %  Auto Basophil % : 0.6 %    RDW: 14.4  14.3      BMP: 02-15-18 @ 08:09  137 | 101 | 5    -----------------< 141  3.2  | 28 | 0.8  eGFR(AA): 81, eGFR (non-AA): 70  Ca 8.4, Mg 1.7, P 3.4  BMP: 18 @ 17:12  140 | 104 | 8    -----------------< 96  5.2  | 25 | 0.9  eGFR(AA): 73, eGFR (non-AA): 63  Ca 8.8, Mg --, P --    LFTs: 02-15-18 @ 08:09  TP  6.0  | 3.2 Alb   ---------------  TB  0.7  | --  DB   ---------------  ALT 21   | 22  AST          ^        45          ALK  LFTs: 18 @ 17:12  TP  6.7  | 3.4 Alb   ---------------  TB  1.5  | --  DB   ---------------  ALT 20   | 52  AST          ^        47          ALK      LFTs: 02-15-18 @ 08:09  Ca  8.4  | 22 AST   -----------------  TP  6.0  | 21 ALT  -----------------  Alb 3.2  | 45 ALK          ^        0.7         TB  LFTs: 18 @ 17:12  Ca  8.8  | 52 AST   -----------------  TP  6.7  | 20 ALT  -----------------  Alb 3.4  | 47 ALK          ^        1.5         TB          Urin< from: Xray Chest 2 Views PA/Lat (18 @ 16:56) >  New right lower lobe opacity consistent with pneumonia in the proper   clinical setting    < end of copied text >  alysis:  Urinalysis Basic - ( 2018 17:12 )    Color: Yellow / Appearance: Cloudy / S.015 / pH: x  Gluc: x / Ketone: Negative  / Bili: Negative / Urobili: 1.0 mg/dL   Blood: x / Protein: 30 mg/dL / Nitrite: Negative   Leuk Esterase: Negative / RBC: x / WBC 1-2 /HPF   Sq Epi: x / Non Sq Epi: Moderate /HPF / Bacteria: Few /HPF      Cultures:      Home Medications:  Home Medications:  albuterol 2.5 mg/3 mL (0.083%) inhalation solution: 3 milliliter(s) inhaled every 2 hours, As Needed (2018 16:12)  AMLODIPINE   TAB 5MG:  (2018 22:04)  ATORVASTATIN TAB 20MG:  (2018 22:04)  ENALAPRIL    TAB 10MG:  (2018 22:04)  furosemide 20 mg oral tablet: 20 milligram(s) orally once a day (2018 16:12)  LEVOTHYROXIN TAB 25MCG:  (2018 22:04)  metoprolol tartrate 25 mg oral tablet: 1 tab(s) orally 2 times a day (2018 16:12)      Assessment:  77y female with PMH listed presented for .  Found to have Pneumonia  Influenza      Plan:  Pneumonia  Influenza      Gilberto Peter MD.  Date/Time: 02-15-18 @ 14:42

## 2018-02-15 NOTE — PROGRESS NOTE ADULT - ASSESSMENT
77y female with PMH listed pw cc worsening sob / cough / feeling of anxiety.  Found to have RLL PNA.     PLAN :    RLL PNA with Influenza B positive  -- o2, nebs, steroids stopped since pt is saturating well.  -- check bcx, sputum cx  -- ddimer mildly elevated likely due to infection  -- iv abx:  rocephin + azithromycin  -- cw 5 day of tamiflu (3d left)      Hypokalemia  --  recheck bmp in am.   --  repleted orally       SEVERE MITRAL REGURGITATION  -- pt with plans to have mvr as o/p.        HTN / HF (NOT IN EXACERBATION)  --  cw home regimen      DVT PPX  DISPO: home tomorrow in morning

## 2018-02-16 ENCOUNTER — TRANSCRIPTION ENCOUNTER (OUTPATIENT)
Age: 78
End: 2018-02-16

## 2018-02-16 VITALS
TEMPERATURE: 97 F | RESPIRATION RATE: 18 BRPM | DIASTOLIC BLOOD PRESSURE: 76 MMHG | SYSTOLIC BLOOD PRESSURE: 158 MMHG | OXYGEN SATURATION: 98 % | HEART RATE: 68 BPM

## 2018-02-16 LAB
ANION GAP SERPL CALC-SCNC: 7 MMOL/L — SIGNIFICANT CHANGE UP (ref 7–14)
BUN SERPL-MCNC: 5 MG/DL — LOW (ref 10–20)
CALCIUM SERPL-MCNC: 8.7 MG/DL — SIGNIFICANT CHANGE UP (ref 8.5–10.1)
CHLORIDE SERPL-SCNC: 105 MMOL/L — SIGNIFICANT CHANGE UP (ref 98–110)
CO2 SERPL-SCNC: 29 MMOL/L — SIGNIFICANT CHANGE UP (ref 17–32)
CREAT SERPL-MCNC: 0.8 MG/DL — SIGNIFICANT CHANGE UP (ref 0.7–1.5)
CULTURE RESULTS: SIGNIFICANT CHANGE UP
GLUCOSE SERPL-MCNC: 93 MG/DL — SIGNIFICANT CHANGE UP (ref 70–110)
HCT VFR BLD CALC: 29.3 % — LOW (ref 37–47)
HGB BLD-MCNC: 9.3 G/DL — LOW (ref 14–18)
MAGNESIUM SERPL-MCNC: 2.2 MG/DL — SIGNIFICANT CHANGE UP (ref 1.8–2.4)
MCHC RBC-ENTMCNC: 29.1 PG — SIGNIFICANT CHANGE UP (ref 27–31)
MCHC RBC-ENTMCNC: 31.7 G/DL — LOW (ref 32–37)
MCV RBC AUTO: 91.6 FL — HIGH (ref 81–91)
NRBC # BLD: 0 /100 WBCS — SIGNIFICANT CHANGE UP (ref 0–0)
PLATELET # BLD AUTO: 242 K/UL — SIGNIFICANT CHANGE UP (ref 130–400)
POTASSIUM SERPL-MCNC: 3.6 MMOL/L — SIGNIFICANT CHANGE UP (ref 3.5–5)
POTASSIUM SERPL-SCNC: 3.6 MMOL/L — SIGNIFICANT CHANGE UP (ref 3.5–5)
RBC # BLD: 3.2 M/UL — LOW (ref 4.2–5.4)
RBC # FLD: 14.2 % — SIGNIFICANT CHANGE UP (ref 11.5–14.5)
SODIUM SERPL-SCNC: 141 MMOL/L — SIGNIFICANT CHANGE UP (ref 135–146)
SPECIMEN SOURCE: SIGNIFICANT CHANGE UP
WBC # BLD: 8.15 K/UL — SIGNIFICANT CHANGE UP (ref 4.8–10.8)
WBC # FLD AUTO: 8.15 K/UL — SIGNIFICANT CHANGE UP (ref 4.8–10.8)

## 2018-02-16 RX ORDER — CIPROFLOXACIN LACTATE 400MG/40ML
1 VIAL (ML) INTRAVENOUS
Qty: 3 | Refills: 0
Start: 2018-02-16 | End: 2018-02-18

## 2018-02-16 RX ADMIN — ENOXAPARIN SODIUM 40 MILLIGRAM(S): 100 INJECTION SUBCUTANEOUS at 11:09

## 2018-02-16 RX ADMIN — Medication 75 MILLIGRAM(S): at 05:38

## 2018-02-16 RX ADMIN — ALBUTEROL 2.5 MILLIGRAM(S): 90 AEROSOL, METERED ORAL at 08:04

## 2018-02-16 RX ADMIN — Medication 3 MILLILITER(S): at 08:04

## 2018-02-16 RX ADMIN — Medication 20 MILLIGRAM(S): at 05:37

## 2018-02-16 RX ADMIN — Medication 25 MILLIGRAM(S): at 05:38

## 2018-02-16 RX ADMIN — BUDESONIDE AND FORMOTEROL FUMARATE DIHYDRATE 2 PUFF(S): 160; 4.5 AEROSOL RESPIRATORY (INHALATION) at 11:17

## 2018-02-16 RX ADMIN — Medication 25 MICROGRAM(S): at 05:37

## 2018-02-16 RX ADMIN — CEFTRIAXONE 100 GRAM(S): 500 INJECTION, POWDER, FOR SOLUTION INTRAMUSCULAR; INTRAVENOUS at 11:09

## 2018-02-16 RX ADMIN — AMLODIPINE BESYLATE 5 MILLIGRAM(S): 2.5 TABLET ORAL at 05:37

## 2018-02-16 NOTE — DISCHARGE NOTE ADULT - CARE PLAN
Principal Discharge DX:	Pneumonia of right lower lobe due to infectious organism  Goal:	resolution of infection  Assessment and plan of treatment:	Complete antibiotic course.  Secondary Diagnosis:	Influenza  Goal:	resolution of infection  Assessment and plan of treatment:	Complete antiviral course

## 2018-02-16 NOTE — DISCHARGE NOTE ADULT - HOSPITAL COURSE
77y female with PMH listed pw cc worsening sob / cough / feeling of anxiety.  Found to have RLL PNA. Blood cultures are negative. She feels much better. Patient is safe for discharge on oral antibiotics and one more day of tamiflu

## 2018-02-16 NOTE — PROGRESS NOTE ADULT - ASSESSMENT
77y female with PMH listed pw cc worsening sob / cough / feeling of anxiety.  Found to have RLL PNA.     PLAN :    RLL PNA with Influenza B positive  -- o2, nebs, steroids stopped since pt is saturating well.  -- Blood cultures are negative  -- ddimer mildly elevated likely due to infection  -- iv abx:  rocephin + azithromycin  -- cw 5 day of tamiflu (3d left)  --will discharge patient on oral antibiotics      Hypokalemia  --  repleted orally       SEVERE MITRAL REGURGITATION  -- pt with plans to have mvr as o/p.        HTN / HF (NOT IN EXACERBATION)  --  cw home regimen      DVT PPX  DISPO: home tomorrow in morning 77y female with PMH listed pw cc worsening sob / cough / feeling of anxiety.  Found to have RLL PNA.     PLAN :    RLL PNA with Influenza B positive  -- o2, nebs, steroids stopped since pt is saturating well.  -- Blood cultures are negative  -- ddimer mildly elevated likely due to infection  -- iv abx:  rocephin + azithromycin  -- tamiflu course completed  --will discharge patient on oral antibiotics      Hypokalemia  --  repleted orally       SEVERE MITRAL REGURGITATION  -- pt with plans to have mvr as o/p.        HTN / HF (NOT IN EXACERBATION)  --  cw home regimen      DVT PPX  DISPO: home today

## 2018-02-16 NOTE — DISCHARGE NOTE ADULT - PATIENT PORTAL LINK FT
You can access the [facility] Patient Portal, offered by [facility], by registering with the following website: [email or web address]

## 2018-02-16 NOTE — DISCHARGE NOTE ADULT - CARE PROVIDER_API CALL
Jas Renner), Internal Medicine  [address]  [address]  [place] [address]  Phone: [phone]  Fax: [phone]

## 2018-02-16 NOTE — PROGRESS NOTE ADULT - SUBJECTIVE AND OBJECTIVE BOX
SCOTTY HANNON, female, 77y (40),   MRN-[id number]  Admit Date: 18 (2d)    Chief Complaint  Patient is a 77y old  Female who presents with a chief complaint of SOB (2018 22:09)      Past Medical and Surgical History  PAST MEDICAL & SURGICAL HISTORY:  Severe mitral regurgitation  COPD (chronic obstructive pulmonary disease)  CHF (congestive heart failure)  HTN (hypertension)  History of cholecystectomy      Current Medications:  MEDICATIONS  (STANDING):  ALBUTerol    0.083% 2.5 milliGRAM(s) Nebulizer every 6 hours  ALBUTerol/ipratropium for Nebulization 3 milliLiter(s) Nebulizer every 4 hours  amLODIPine   Tablet 5 milliGRAM(s) Oral daily  atorvastatin 20 milliGRAM(s) Oral at bedtime  azithromycin  IVPB 500 milliGRAM(s) IV Intermittent every 24 hours  buDESOnide  80 MICROgram(s)/formoterol 4.5 MICROgram(s) Inhaler 2 Puff(s) Inhalation two times a day  cefTRIAXone   IVPB 1 Gram(s) IV Intermittent every 24 hours  enoxaparin Injectable 40 milliGRAM(s) SubCutaneous every 24 hours  furosemide    Tablet 20 milliGRAM(s) Oral daily  levothyroxine 25 MICROGram(s) Oral daily  metoprolol     tartrate 25 milliGRAM(s) Oral two times a day  oseltamivir 75 milliGRAM(s) Oral two times a day    Interval History:  No acute events overnight. No complaints at this time.    Vital Signs:  T(F): 97 (18 @ 08:41), Max: 98.1 (02-15-18 @ 16:08)  HR: 58 (18 @ 08:41) (57 - 85)  BP: 132/70 (18 @ 08:41) (119/81 - 169/94)  RR: 18 (18 @ 08:41) (18 - 20)  SpO2: 98% (18 @ 08:41) (96% - 98%)T(F): 97 (18 @ 08:41), Max: 98.1 (02-15-18 @ 16:08)  HR: 58 (18 @ 08:41) (58 - 66)  BP: 132/70 (02-16-18 @ 08:41) (132/70 - 168/79)  RR: 18 (18 @ 08:41) (18 - 18)  SpO2: 98% (18 @ 08:41) (96% - 98%)  CAPILLARY BLOOD GLUCOSE          Physical Exam:  General: Not in distress.   HEENT: Moist mucus membranes. PERRLA.  Cardio: Regular rate and rhythm, S1, S2, Mild pansystolic murmur  Pulm: Mild rales bilaterally  Abdomen: Soft, non-tender, non-distended. Normoactive bowel sounds  Extremities: No cyanosis or edema bilaterally. No calf tenderness to palpation  Neuro: A&O x3. No focal deficits. CN II - XII grossly intact.    Labs and Imaging:  CBC Full  -  ( 15 Feb 2018 08:09 )  WBC Count : 5.38 K/uL  Hemoglobin : 9.2 g/dL  Hematocrit : 28.7 %  Platelet Count - Automated : 228 K/uL  Mean Cell Volume : 92.3 fL  Mean Cell Hemoglobin : 29.6 pg  Mean Cell Hemoglobin Concentration : 32.1 g/dL  Auto Neutrophil # : 2.83 K/uL  Auto Lymphocyte # : 1.72 K/uL  Auto Monocyte # : 0.65 K/uL  Auto Eosinophil # : 0.13 K/uL  Auto Basophil # : 0.03 K/uL  Auto Neutrophil % : 52.5 %  Auto Lymphocyte % : 32.0 %  Auto Monocyte % : 12.1 %  Auto Eosinophil % : 2.4 %  Auto Basophil % : 0.6 %    RDW:       LFTs: 02-15-18 @ 08:09  TP  6.0  | 3.2 Alb   ---------------  TB  0.7  | --  DB   ---------------  ALT 21   | 22  AST          ^        45          ALK  LFTs: 18 @ 17:12  TP  6.7  | 3.4 Alb   ---------------  TB  1.5  | --  DB   ---------------  ALT 20   | 52  AST          ^        47          ALK            Urinalysis:  Urinalysis Basic - ( 2018 17:12 )    Color: Yellow / Appearance: Cloudy / S.015 / pH: x  Gluc: x / Ketone: Negative  / Bili: Negative / Urobili: 1.0 mg/dL   Blood: x / Protein: 30 mg/dL / Nitrite: Negative   Leuk Esterase: Negative / RBC: x / WBC 1-2 /HPF   Sq Epi: x / Non Sq Epi: Moderate /HPF / Bacteria: Few /HPF      Cultures:     (collected 18 @ 17:12)  Source: .Blood Blood-Peripheral  Preliminary Report:    No growth to date.        Home Medications:  Home Medications:  albuterol 2.5 mg/3 mL (0.083%) inhalation solution: 3 milliliter(s) inhaled every 2 hours, As Needed (2018 16:12)  AMLODIPINE   TAB 5MG:  (2018 22:04)  ATORVASTATIN TAB 20MG:  (2018 22:04)  ENALAPRIL    TAB 10MG:  (2018 22:04)  furosemide 20 mg oral tablet: 20 milligram(s) orally once a day (2018 16:12)  LEVOTHYROXIN TAB 25MCG:  (2018 22:04)  metoprolol tartrate 25 mg oral tablet: 1 tab(s) orally 2 times a day (2018 16:12)      Assessment:  77y female with PMH listed presented for .  Found to have Pneumonia  Influenza      Plan:  Pneumonia  Influenza      Gilberto Peter MD.  Date/Time: 18 @ 09:06

## 2018-02-16 NOTE — DISCHARGE NOTE ADULT - MEDICATION SUMMARY - MEDICATIONS TO TAKE
I will START or STAY ON the medications listed below when I get home from the hospital:    ENALAPRIL    TAB 10MG  -- Indication: For Htn    ATORVASTATIN TAB 20MG  -- Indication: For dld    oseltamivir 75 mg oral capsule  -- 1 cap(s) by mouth 2 times a day   -- Check with your doctor before becoming pregnant.  Finish all this medication unless otherwise directed by prescriber.    -- Indication: For Influenza    metoprolol tartrate 25 mg oral tablet  -- 1 tab(s) by mouth 2 times a day  -- Indication: For Htn    albuterol 2.5 mg/3 mL (0.083%) inhalation solution  -- 3 milliliter(s) inhaled every 2 hours, As Needed  -- Indication: For asthma    AMLODIPINE   TAB 5MG  -- Indication: For Htn    furosemide 20 mg oral tablet  -- 20 milligram(s) by mouth once a day  -- Indication: For Severe mitral regurgitation    Levaquin 500 mg oral tablet  -- 1 tab(s) by mouth once a day   -- Avoid prolonged or excessive exposure to direct and/or artificial sunlight while taking this medication.  Do not take dairy products, antacids, or iron preparations within one hour of this medication.  Finish all this medication unless otherwise directed by prescriber.  May cause drowsiness or dizziness.  Medication should be taken with plenty of water.    -- Indication: For PNEUMONIA    LEVOTHYROXIN TAB 25MCG  -- Indication: For Hypothyroidism

## 2018-02-20 DIAGNOSIS — J18.9 PNEUMONIA, UNSPECIFIED ORGANISM: ICD-10-CM

## 2018-02-20 DIAGNOSIS — Z87.891 PERSONAL HISTORY OF NICOTINE DEPENDENCE: ICD-10-CM

## 2018-02-20 DIAGNOSIS — J10.00 INFLUENZA DUE TO OTHER IDENTIFIED INFLUENZA VIRUS WITH UNSPECIFIED TYPE OF PNEUMONIA: ICD-10-CM

## 2018-02-20 DIAGNOSIS — I34.0 NONRHEUMATIC MITRAL (VALVE) INSUFFICIENCY: ICD-10-CM

## 2018-02-20 DIAGNOSIS — J44.9 CHRONIC OBSTRUCTIVE PULMONARY DISEASE, UNSPECIFIED: ICD-10-CM

## 2018-02-20 DIAGNOSIS — E87.6 HYPOKALEMIA: ICD-10-CM

## 2018-02-20 DIAGNOSIS — I11.0 HYPERTENSIVE HEART DISEASE WITH HEART FAILURE: ICD-10-CM

## 2018-02-20 DIAGNOSIS — I50.9 HEART FAILURE, UNSPECIFIED: ICD-10-CM

## 2018-02-20 LAB
CULTURE RESULTS: SIGNIFICANT CHANGE UP
CULTURE RESULTS: SIGNIFICANT CHANGE UP
SPECIMEN SOURCE: SIGNIFICANT CHANGE UP
SPECIMEN SOURCE: SIGNIFICANT CHANGE UP

## 2018-07-09 ENCOUNTER — APPOINTMENT (OUTPATIENT)
Dept: OBGYN | Facility: CLINIC | Age: 78
End: 2018-07-09

## 2018-08-19 ENCOUNTER — INPATIENT (INPATIENT)
Facility: HOSPITAL | Age: 78
LOS: 2 days | Discharge: HOME | End: 2018-08-22
Attending: HOSPITALIST | Admitting: HOSPITALIST

## 2018-08-19 VITALS
DIASTOLIC BLOOD PRESSURE: 62 MMHG | SYSTOLIC BLOOD PRESSURE: 121 MMHG | OXYGEN SATURATION: 97 % | HEART RATE: 80 BPM | RESPIRATION RATE: 18 BRPM | TEMPERATURE: 100 F

## 2018-08-19 DIAGNOSIS — Z98.890 OTHER SPECIFIED POSTPROCEDURAL STATES: Chronic | ICD-10-CM

## 2018-08-19 PROBLEM — J44.9 CHRONIC OBSTRUCTIVE PULMONARY DISEASE, UNSPECIFIED: Chronic | Status: ACTIVE | Noted: 2018-02-10

## 2018-08-19 PROBLEM — I50.9 HEART FAILURE, UNSPECIFIED: Chronic | Status: ACTIVE | Noted: 2018-02-10

## 2018-08-19 PROBLEM — I10 ESSENTIAL (PRIMARY) HYPERTENSION: Chronic | Status: ACTIVE | Noted: 2018-02-10

## 2018-08-19 PROBLEM — I34.0 NONRHEUMATIC MITRAL (VALVE) INSUFFICIENCY: Chronic | Status: ACTIVE | Noted: 2018-02-14

## 2018-08-19 LAB
ABO RH CONFIRMATION: SIGNIFICANT CHANGE UP
ALBUMIN SERPL ELPH-MCNC: 3.9 G/DL — SIGNIFICANT CHANGE UP (ref 3.5–5.2)
ALP SERPL-CCNC: 53 U/L — SIGNIFICANT CHANGE UP (ref 30–115)
ALT FLD-CCNC: 12 U/L — SIGNIFICANT CHANGE UP (ref 0–41)
ANION GAP SERPL CALC-SCNC: 15 MMOL/L — HIGH (ref 7–14)
APTT BLD: 32.7 SEC — SIGNIFICANT CHANGE UP (ref 27–39.2)
AST SERPL-CCNC: 24 U/L — SIGNIFICANT CHANGE UP (ref 0–41)
BASE EXCESS BLDV CALC-SCNC: 2.8 MMOL/L — HIGH (ref -2–2)
BASOPHILS # BLD AUTO: 0.03 K/UL — SIGNIFICANT CHANGE UP (ref 0–0.2)
BASOPHILS NFR BLD AUTO: 0.4 % — SIGNIFICANT CHANGE UP (ref 0–1)
BILIRUB SERPL-MCNC: 0.3 MG/DL — SIGNIFICANT CHANGE UP (ref 0.2–1.2)
BLD GP AB SCN SERPL QL: SIGNIFICANT CHANGE UP
BUN SERPL-MCNC: 12 MG/DL — SIGNIFICANT CHANGE UP (ref 10–20)
CA-I SERPL-SCNC: 1.17 MMOL/L — SIGNIFICANT CHANGE UP (ref 1.12–1.3)
CALCIUM SERPL-MCNC: 8.7 MG/DL — SIGNIFICANT CHANGE UP (ref 8.5–10.1)
CHLORIDE SERPL-SCNC: 99 MMOL/L — SIGNIFICANT CHANGE UP (ref 98–110)
CO2 SERPL-SCNC: 24 MMOL/L — SIGNIFICANT CHANGE UP (ref 17–32)
CREAT SERPL-MCNC: 1 MG/DL — SIGNIFICANT CHANGE UP (ref 0.7–1.5)
EOSINOPHIL # BLD AUTO: 0.07 K/UL — SIGNIFICANT CHANGE UP (ref 0–0.7)
EOSINOPHIL NFR BLD AUTO: 0.9 % — SIGNIFICANT CHANGE UP (ref 0–8)
GAS PNL BLDV: 141 MMOL/L — SIGNIFICANT CHANGE UP (ref 136–145)
GAS PNL BLDV: SIGNIFICANT CHANGE UP
GLUCOSE SERPL-MCNC: 99 MG/DL — SIGNIFICANT CHANGE UP (ref 70–99)
HCO3 BLDV-SCNC: 29 MMOL/L — SIGNIFICANT CHANGE UP (ref 22–29)
HCT VFR BLD CALC: 19 % — LOW (ref 37–47)
HCT VFR BLDA CALC: 16.9 % — LOW (ref 34–44)
HGB BLD CALC-MCNC: 5.5 G/DL — LOW (ref 14–18)
HGB BLD-MCNC: 5.9 G/DL — CRITICAL LOW (ref 12–16)
IMM GRANULOCYTES NFR BLD AUTO: 0.4 % — HIGH (ref 0.1–0.3)
INR BLD: 1.15 RATIO — SIGNIFICANT CHANGE UP (ref 0.65–1.3)
LACTATE BLDV-MCNC: 1.3 MMOL/L — SIGNIFICANT CHANGE UP (ref 0.5–1.6)
LACTATE SERPL-SCNC: 1.1 MMOL/L — SIGNIFICANT CHANGE UP (ref 0.5–2.2)
LIDOCAIN IGE QN: 36 U/L — SIGNIFICANT CHANGE UP (ref 7–60)
LYMPHOCYTES # BLD AUTO: 2.06 K/UL — SIGNIFICANT CHANGE UP (ref 1.2–3.4)
LYMPHOCYTES # BLD AUTO: 25.2 % — SIGNIFICANT CHANGE UP (ref 20.5–51.1)
MAGNESIUM SERPL-MCNC: 2 MG/DL — SIGNIFICANT CHANGE UP (ref 1.8–2.4)
MCHC RBC-ENTMCNC: 29.2 PG — SIGNIFICANT CHANGE UP (ref 27–31)
MCHC RBC-ENTMCNC: 31.1 G/DL — LOW (ref 32–37)
MCV RBC AUTO: 94.1 FL — SIGNIFICANT CHANGE UP (ref 81–99)
MONOCYTES # BLD AUTO: 0.88 K/UL — HIGH (ref 0.1–0.6)
MONOCYTES NFR BLD AUTO: 10.8 % — HIGH (ref 1.7–9.3)
NEUTROPHILS # BLD AUTO: 5.1 K/UL — SIGNIFICANT CHANGE UP (ref 1.4–6.5)
NEUTROPHILS NFR BLD AUTO: 62.3 % — SIGNIFICANT CHANGE UP (ref 42.2–75.2)
NRBC # BLD: 0 /100 WBCS — SIGNIFICANT CHANGE UP (ref 0–0)
NT-PROBNP SERPL-SCNC: 633 PG/ML — HIGH (ref 0–300)
PCO2 BLDV: 52 MMHG — HIGH (ref 41–51)
PH BLDV: 7.35 — SIGNIFICANT CHANGE UP (ref 7.26–7.43)
PLATELET # BLD AUTO: 219 K/UL — SIGNIFICANT CHANGE UP (ref 130–400)
PO2 BLDV: 26 MMHG — SIGNIFICANT CHANGE UP (ref 20–40)
POTASSIUM BLDV-SCNC: 3.5 MMOL/L — SIGNIFICANT CHANGE UP (ref 3.3–5.6)
POTASSIUM SERPL-MCNC: 4.3 MMOL/L — SIGNIFICANT CHANGE UP (ref 3.5–5)
POTASSIUM SERPL-SCNC: 4.3 MMOL/L — SIGNIFICANT CHANGE UP (ref 3.5–5)
PROT SERPL-MCNC: 6.8 G/DL — SIGNIFICANT CHANGE UP (ref 6–8)
PROTHROM AB SERPL-ACNC: 12.4 SEC — SIGNIFICANT CHANGE UP (ref 9.95–12.87)
RBC # BLD: 2.02 M/UL — LOW (ref 4.2–5.4)
RBC # FLD: 14.9 % — HIGH (ref 11.5–14.5)
SAO2 % BLDV: 38 % — SIGNIFICANT CHANGE UP
SODIUM SERPL-SCNC: 138 MMOL/L — SIGNIFICANT CHANGE UP (ref 135–146)
TROPONIN T SERPL-MCNC: <0.01 NG/ML — SIGNIFICANT CHANGE UP
TYPE + AB SCN PNL BLD: SIGNIFICANT CHANGE UP
WBC # BLD: 8.17 K/UL — SIGNIFICANT CHANGE UP (ref 4.8–10.8)
WBC # FLD AUTO: 8.17 K/UL — SIGNIFICANT CHANGE UP (ref 4.8–10.8)

## 2018-08-19 RX ORDER — PANTOPRAZOLE SODIUM 20 MG/1
8 TABLET, DELAYED RELEASE ORAL
Qty: 80 | Refills: 0 | Status: DISCONTINUED | OUTPATIENT
Start: 2018-08-19 | End: 2018-08-22

## 2018-08-19 RX ORDER — LEVOTHYROXINE SODIUM 125 MCG
25 TABLET ORAL DAILY
Qty: 0 | Refills: 0 | Status: DISCONTINUED | OUTPATIENT
Start: 2018-08-19 | End: 2018-08-22

## 2018-08-19 RX ORDER — FUROSEMIDE 40 MG
20 TABLET ORAL DAILY
Qty: 0 | Refills: 0 | Status: DISCONTINUED | OUTPATIENT
Start: 2018-08-19 | End: 2018-08-22

## 2018-08-19 RX ORDER — AZITHROMYCIN 500 MG/1
500 TABLET, FILM COATED ORAL ONCE
Qty: 0 | Refills: 0 | Status: DISCONTINUED | OUTPATIENT
Start: 2018-08-19 | End: 2018-08-19

## 2018-08-19 RX ORDER — METOPROLOL TARTRATE 50 MG
25 TABLET ORAL
Qty: 0 | Refills: 0 | Status: DISCONTINUED | OUTPATIENT
Start: 2018-08-19 | End: 2018-08-22

## 2018-08-19 RX ORDER — IPRATROPIUM/ALBUTEROL SULFATE 18-103MCG
3 AEROSOL WITH ADAPTER (GRAM) INHALATION ONCE
Qty: 0 | Refills: 0 | Status: COMPLETED | OUTPATIENT
Start: 2018-08-19 | End: 2018-08-19

## 2018-08-19 RX ORDER — ALBUTEROL 90 UG/1
2.5 AEROSOL, METERED ORAL EVERY 6 HOURS
Qty: 0 | Refills: 0 | Status: DISCONTINUED | OUTPATIENT
Start: 2018-08-19 | End: 2018-08-22

## 2018-08-19 RX ORDER — AMLODIPINE BESYLATE 2.5 MG/1
5 TABLET ORAL DAILY
Qty: 0 | Refills: 0 | Status: DISCONTINUED | OUTPATIENT
Start: 2018-08-19 | End: 2018-08-22

## 2018-08-19 RX ORDER — ATORVASTATIN CALCIUM 80 MG/1
20 TABLET, FILM COATED ORAL AT BEDTIME
Qty: 0 | Refills: 0 | Status: DISCONTINUED | OUTPATIENT
Start: 2018-08-19 | End: 2018-08-22

## 2018-08-19 RX ADMIN — PANTOPRAZOLE SODIUM 10 MG/HR: 20 TABLET, DELAYED RELEASE ORAL at 22:42

## 2018-08-19 RX ADMIN — ATORVASTATIN CALCIUM 20 MILLIGRAM(S): 80 TABLET, FILM COATED ORAL at 22:49

## 2018-08-19 RX ADMIN — Medication 60 MILLIGRAM(S): at 15:14

## 2018-08-19 RX ADMIN — Medication 3 MILLILITER(S): at 15:14

## 2018-08-19 NOTE — H&P ADULT - ATTENDING COMMENTS
Patient seen and examined independently. I agree with the resident's note, physical exam, and plan except as below.  Vital Signs Last 24 Hrs  T(C): 36.2 (20 Aug 2018 08:02), Max: 37.7 (19 Aug 2018 15:30)  T(F): 97.2 (20 Aug 2018 08:02), Max: 99.9 (19 Aug 2018 15:30)  HR: 69 (20 Aug 2018 08:02) (68 - 83)  BP: 150/79 (20 Aug 2018 08:02) (119/58 - 150/81)  BP(mean): --  RR: 118 (20 Aug 2018 08:02) (18 - 118)  SpO2: 97% (20 Aug 2018 08:02) (97% - 99%)                          7.4    8.51  )-----------( 179      ( 20 Aug 2018 08:24 )             22.3       #UGIB suspected - npo, IV PPI - pt refuses egd but agreeable for cscope - discussed with GI - they will be rounding soon  pt attributes all symptoms to drining a cleansing detox green tea  #acute blood loss anemia - sp transfusion - monitor cbc  follow up with GI if endoscopy planned  discussed with pt and daughter at bedside  PMD Dr Alcocer - will get in touch with service

## 2018-08-19 NOTE — ED ADULT NURSE NOTE - OBJECTIVE STATEMENT
pt comes in with nausea and diarrhea for the past couple of days. drank a tea to "clear her out" and hasn't felt well since. fells dizzy when she coughs and needs to sit down because she feels weak. clammy at home but unknown if she had fevers. decreases appetite. productive cough.

## 2018-08-19 NOTE — ED ADULT NURSE NOTE - NSIMPLEMENTINTERV_GEN_ALL_ED
Implemented All Fall with Harm Risk Interventions:  [place] to call system. Call bell, personal items and telephone within reach. Instruct patient to call for assistance. Room bathroom lighting operational. Non-slip footwear when patient is off stretcher. Physically safe environment: no spills, clutter or unnecessary equipment. Stretcher in lowest position, wheels locked, appropriate side rails in place. Provide visual cue, wrist band, yellow gown, etc. Monitor gait and stability. Monitor for mental status changes and reorient to person, place, and time. Review medications for side effects contributing to fall risk. Reinforce activity limits and safety measures with patient and family. Provide visual clues: red socks.

## 2018-08-19 NOTE — ED PROVIDER NOTE - NS ED ROS FT
Review of Systems:  	•	CONSTITUTIONAL - + fever, No diaphoresis, No weight change  	•	SKIN - No rash  	•	HEMATOLOGIC - No abnormal bleeding or bruising  	•	EYES - No eye pain, No blurred vision  	•	ENT - No change in hearing, No sore throat, No neck pain, No rhinorrhea, No ear pain  	•	RESPIRATORY - + shortness of breath, + cough  	•	CARDIAC - No chest pain, No palpitations  	•	GI - No abdominal pain, No nausea, No vomiting, No diarrhea, No constipation, No bright red blood per rectum or melena.                      •                 - No dysuria, frequency, hematuria.   	•	ENDO - No polydypsia, No polyuria, No heat/cold intolerance  	•	MUSCULOSKELETAL - No joint paint, No swelling, No back pain  	•	NEUROLOGIC - No numbness, No focal weakness, No headache, No dizziness

## 2018-08-19 NOTE — H&P ADULT - HISTORY OF PRESENT ILLNESS
77 yo female PMHx HTN, COPD , hypothyroidism, CHF presented for bloody BM, fever, wheezing.   She reports having 77 yo female PMHx HTN, COPD , hypothyroidism, severe MR, CHF presented for bloody BM, fever, wheezing.   She reports having 77 yo female PMHx HTN, COPD not on home O2 or BiPAP , hypothyroidism, severe MR, CHF presented for bloody BM, fever, wheezing.   She reports that two days ago in the morning she had cleansing tea then had >5 episodes of mucoid stool with blood for 2 days, not associated with abdominal pain, no nausea or vomiting, no bloating, and she got weak and short of breath due to dehydration and hx of MR. She also  reported some wheezing two days ago which resolved, and no chest pain or palpitation, no confusion. She has chronic cough productive of whitish sputum. She denied fever, chills, urinary symptoms, joint pain or swelling.

## 2018-08-19 NOTE — H&P ADULT - PMH
CHF (congestive heart failure)    COPD (chronic obstructive pulmonary disease)    HTN (hypertension)    Other specified hypothyroidism    Severe mitral regurgitation

## 2018-08-19 NOTE — ED PROVIDER NOTE - PROGRESS NOTE DETAILS
LINDSAY Fonseca chaperone for rectal exam with dark brown/melena and BRBPR small amount. no active bleeding. will place another IV and c/s GI consulted ELENO Em who recommends npo, ppi drip

## 2018-08-19 NOTE — ED PROVIDER NOTE - CARE PLAN
Principal Discharge DX:	GI bleed  Secondary Diagnosis:	Anemia  Secondary Diagnosis:	COPD exacerbation Principal Discharge DX:	GI bleed  Assessment and plan of treatment:	a/p: fatigue, sob, cough, fever, recent bloody diarrhea, will check labs, ekg/trop, CXR, VBG, nebs, steroids, abx, treat copd, r/o pna, r/o acs, r/o anemia 2/2 GIB, re-eval  Secondary Diagnosis:	Anemia  Secondary Diagnosis:	COPD exacerbation

## 2018-08-19 NOTE — H&P ADULT - ASSESSMENT
- acute blood-loss anemia  - Baseline Hb= 9.3 on 2/16  - s/p 1 unit PRBC  - c/s GI  - f/u GI pcr    # Shortness of breath + wheezing:  - O2 by n/c PRN  - c/w nebulizers  - c/w IV atbx  - start PO lasix in AM  - hold steroids due to increased of GI bleed or perforation    # hypothyroidism:  - last TSH=13.3 on  4-16  - c/w synthroid    # HTN:  - controlled  - in light of GI bleed hold meds tonight and restart in AM    # DVT ppx: sequential compression devices and start SQ ppx when H&H are stable  Diet:  Acitivity: 77 yo female PMHx HTN, COPD , hypothyroidism, severe MR, CHF presented for bloody BM, fever, wheezing.    # Acute dysentery:  - severe a/w acute blood-loss anemia (elderly, multiple comorbidities) a/w moderate hypovolemia  - Baseline Hb= 9.3 on 2/16  - s/p 1 unit PRBC  - gentle hydration (severe MR)  - c/s GI  - f/u GI pcr for shigella/salmonella    # Shortness of breath + wheezing:  - O2 by n/c PRN, Hx of COPD, MR, CHF  - improved   - c/w nebulizers  - c/w PO lasix  - f/u TTE  - hold steroids due to increased of GI bleed or perforation    # hypothyroidism:  - last TSH=13.3 on  4-16  - c/w synthroid    # HTN:  - controlled  - c/w meds    # DVT ppx: sequential compression devices and start SQ ppx when H&H are stable  Diet: DASH diet  Acitivity: ambulate as tolerated 77 yo female PMHx HTN, COPD , hypothyroidism, severe MR, CHF presented for bloody BM, fever, wheezing.    # Acute dysentery:  - severe a/w acute blood-loss anemia (elderly, multiple comorbidities) a/w moderate hypovolemia  - Baseline Hb= 9.3 on 2/16  - s/p 1 unit PRBC, f/u 11.30 PM CBC  - gentle hydration (severe MR)  - Resume ASA when H&H is stable  - c/s GI  - f/u GI pcr for shigella/salmonella    # Shortness of breath + wheezing:  - O2 by n/c PRN, Hx of COPD, MR, CHF  - improved   - c/w nebulizers  - c/w PO lasix  - f/u TTE  - hold steroids due to increased of GI bleed or perforation    # hypothyroidism:  - last TSH=13.3 on  4-16  - c/w synthroid    # HTN:  - controlled  - c/w meds    # DVT ppx: sequential compression devices and start SQ ppx when H&H are stable  Diet: DASH diet  Acitivity: ambulate as tolerated

## 2018-08-19 NOTE — ED PROVIDER NOTE - OBJECTIVE STATEMENT
78F PMH CHF, copd, HTN, p/w sob and fatigue. has sub fever, chills, prod cough clear sputum assoc. states thurs/friday she has severe bloody diarrhea after drinking "green detox tea" she had at home. no abd pain then or now. bleeding has stopped. no n/v. no longer has diarrhea. now just feels generalized weakness.  never had colonoscopy and has never seen a GI doctor. no hx of anemia. no recent abx, travel. no sick contacts. no cp. no dysuria, freq, hematuria. no palp, dizziness, ,loc.

## 2018-08-19 NOTE — H&P ADULT - NSHPLABSRESULTS_GEN_ALL_CORE
5.9    8.17  )-----------( 219      ( 19 Aug 2018 15:02 )             19.0       08-19    138  |  99  |  12  ----------------------------<  99  4.3   |  24  |  1.0    Ca    8.7      19 Aug 2018 15:02  Mg     2.0     08-19    TPro  6.8  /  Alb  3.9  /  TBili  0.3  /  DBili  x   /  AST  24  /  ALT  12  /  AlkPhos  53  08-19                  PT/INR - ( 19 Aug 2018 15:02 )   PT: 12.40 sec;   INR: 1.15 ratio         PTT - ( 19 Aug 2018 15:02 )  PTT:32.7 sec    Lactate Trend  08-19 @ 15:02 Lactate:1.1       CARDIAC MARKERS ( 19 Aug 2018 15:02 )  x     / <0.01 ng/mL / x     / x     / x            CAPILLARY BLOOD GLUCOSE

## 2018-08-19 NOTE — ED PROVIDER NOTE - MEDICAL DECISION MAKING DETAILS
pt found to have BRBPR/dark stool no active bleeding, H/H low- transfused, treated for copd exac, needs admission for GI eval and colonoscopy.

## 2018-08-19 NOTE — H&P ADULT - NSHPREVIEWOFSYSTEMS_GEN_ALL_CORE
REVIEW OF SYSTEMS    CONSTITUTIONAL: No weakness, fevers or chills  EYES/ENT: No visual changes;  No vertigo or throat pain   NECK: No pain or stiffness. No cervical midline tenderness.  RESPIRATORY: No difficulty breathing, cough, wheezing.  CARDIOVASCULAR: No chest pain, SOB, or palpitations  GASTROINTESTINAL: No abdominal or epigastric pain. No nausea, vomiting, or hematemesis. No diarrhea or constipation. No melena, BRBPR, or hematochezia.  GENITOURINARY: No dysuria, frequency or hematuria.  NEUROLOGICAL: No headache. No numbness or weakness.  SKIN: No itching, rashes. REVIEW OF SYSTEMS    CONSTITUTIONAL: + weakness, fevers or chills  EYES/ENT: No visual changes;  No vertigo or throat pain   NECK: No pain or stiffness. No cervical midline tenderness.  RESPIRATORY: + difficulty breathing, chronic unchanged nonproductive cough, + wheezing in past two days, resolved.  CARDIOVASCULAR: No chest pain, or palpitations  GASTROINTESTINAL: No abdominal or epigastric pain. No nausea, vomiting, or hematemesis. + bloody diarrhea.  GENITOURINARY: No dysuria, frequency or hematuria.  NEUROLOGICAL: No headache. No numbness or weakness.  SKIN: No itching, rashes.

## 2018-08-19 NOTE — ED PROVIDER NOTE - PLAN OF CARE
a/p: fatigue, sob, cough, fever, recent bloody diarrhea, will check labs, ekg/trop, CXR, VBG, nebs, steroids, abx, treat copd, r/o pna, r/o acs, r/o anemia 2/2 GIB, re-eval

## 2018-08-19 NOTE — ED PROVIDER NOTE - PHYSICAL EXAMINATION
VITAL SIGNS: AFebrile, vital signs stable  CONSTITUTIONAL: Well-developed; well-nourished; in no acute distress.  SKIN: Skin exam is warm and dry, no acute rash.  HEAD: Normocephalic; atraumatic.  EYES: Pupils equal round reactive to light, Extraocular movements intact; conjunctiva and sclera clear.  ENT: No nasal discharge; airway clear. Moist mucus membranes.  NECK: Supple; non tender. No rigidity  CARD: Regular rate and rhythm. Normal S1, S2; no murmurs, gallops, or rubs.  RESP: bibasilar crackles, mild diffuse wheezing, speaking in full sentences but tachypneic   ABD: Abdomen soft; non-tender; non-distended;  no hepatosplenomegaly. No costovertebral angle tenderness.   EXT: Normal ROM. No clubbing, cyanosis or edema. No calf tenderness to palpation.  NEURO: Alert and oriented x 3. No focal deficits.  PSYCH: Cooperative, appropriate.

## 2018-08-19 NOTE — H&P ADULT - NSHPPHYSICALEXAM_GEN_ALL_CORE
PHYSICAL EXAM:    T(C): 36.7 (08-19-18 @ 19:45), Max: 37.8 (08-19-18 @ 13:27)  HR: 68 (08-19-18 @ 19:45) (68 - 83)  BP: 125/60 (08-19-18 @ 19:45) (119/58 - 128/60)  RR: 18 (08-19-18 @ 19:45) (18 - 18)  SpO2: 99% (08-19-18 @ 19:45) (97% - 99%)    Constitutional:  HEENT: Neck supple, No oral lesions, no throat redness or swelling  Respiratory: Breath sounds  CTA b/l, no accessory muscle use  Cardiovascular: regular rate and rhythm, normal S1 S2, no murmurs  Gastrointestinal: soft non-tender no hepatosplenomegaly, normal BS  Genitourinary: no lesions, no discharge  Extremities: positive peripheral pulses no exremity edema  Neurological: AAO4 no focal deficit  Skin: no lesions or wounds  Musculoskeletal: no joint swelling or tenderness PHYSICAL EXAM:    T(C): 36.7 (08-19-18 @ 19:45), Max: 37.8 (08-19-18 @ 13:27)  HR: 68 (08-19-18 @ 19:45) (68 - 83)  BP: 125/60 (08-19-18 @ 19:45) (119/58 - 128/60)  RR: 18 (08-19-18 @ 19:45) (18 - 18)  SpO2: 99% (08-19-18 @ 19:45) (97% - 99%)    Constitutional:  HEENT: Neck supple, No oral lesions, no throat redness or swelling  Respiratory: Breath sounds  decreased with mild rhonchi on RLF, no accessory muscle use  Cardiovascular: regular rate and rhythm, normal S1 S2, 6/6 late systolic decrescendo murmur radiating to axilla  Gastrointestinal: soft non-tender no hepatosplenomegaly, normal BS  Genitourinary: no lesions, no discharge  Extremities: positive peripheral pulses no extremity edema  Neurological: AAO4 no focal deficit  Skin: no lesions or wounds  Musculoskeletal: no joint swelling or tenderness

## 2018-08-20 LAB
ALBUMIN SERPL ELPH-MCNC: 3.5 G/DL — SIGNIFICANT CHANGE UP (ref 3.5–5.2)
ALP SERPL-CCNC: 44 U/L — SIGNIFICANT CHANGE UP (ref 30–115)
ALT FLD-CCNC: 9 U/L — SIGNIFICANT CHANGE UP (ref 0–41)
ANION GAP SERPL CALC-SCNC: 11 MMOL/L — SIGNIFICANT CHANGE UP (ref 7–14)
AST SERPL-CCNC: 12 U/L — SIGNIFICANT CHANGE UP (ref 0–41)
BASOPHILS # BLD AUTO: 0.02 K/UL — SIGNIFICANT CHANGE UP (ref 0–0.2)
BASOPHILS NFR BLD AUTO: 0.2 % — SIGNIFICANT CHANGE UP (ref 0–1)
BILIRUB SERPL-MCNC: 0.4 MG/DL — SIGNIFICANT CHANGE UP (ref 0.2–1.2)
BUN SERPL-MCNC: 16 MG/DL — SIGNIFICANT CHANGE UP (ref 10–20)
CALCIUM SERPL-MCNC: 8.5 MG/DL — SIGNIFICANT CHANGE UP (ref 8.5–10.1)
CHLORIDE SERPL-SCNC: 103 MMOL/L — SIGNIFICANT CHANGE UP (ref 98–110)
CO2 SERPL-SCNC: 25 MMOL/L — SIGNIFICANT CHANGE UP (ref 17–32)
CREAT SERPL-MCNC: 0.8 MG/DL — SIGNIFICANT CHANGE UP (ref 0.7–1.5)
EOSINOPHIL # BLD AUTO: 0 K/UL — SIGNIFICANT CHANGE UP (ref 0–0.7)
EOSINOPHIL NFR BLD AUTO: 0 % — SIGNIFICANT CHANGE UP (ref 0–8)
GLUCOSE SERPL-MCNC: 105 MG/DL — HIGH (ref 70–99)
HCT VFR BLD CALC: 20.5 % — LOW (ref 37–47)
HCT VFR BLD CALC: 22.3 % — LOW (ref 37–47)
HCT VFR BLD CALC: 24.1 % — LOW (ref 37–47)
HGB BLD-MCNC: 6.7 G/DL — CRITICAL LOW (ref 12–16)
HGB BLD-MCNC: 7.4 G/DL — CRITICAL LOW (ref 12–16)
HGB BLD-MCNC: 8 G/DL — LOW (ref 12–16)
IMM GRANULOCYTES NFR BLD AUTO: 0.7 % — HIGH (ref 0.1–0.3)
LYMPHOCYTES # BLD AUTO: 1.25 K/UL — SIGNIFICANT CHANGE UP (ref 1.2–3.4)
LYMPHOCYTES # BLD AUTO: 14.7 % — LOW (ref 20.5–51.1)
MAGNESIUM SERPL-MCNC: 1.9 MG/DL — SIGNIFICANT CHANGE UP (ref 1.8–2.4)
MCHC RBC-ENTMCNC: 29.2 PG — SIGNIFICANT CHANGE UP (ref 27–31)
MCHC RBC-ENTMCNC: 29.4 PG — SIGNIFICANT CHANGE UP (ref 27–31)
MCHC RBC-ENTMCNC: 30.1 PG — SIGNIFICANT CHANGE UP (ref 27–31)
MCHC RBC-ENTMCNC: 32.7 G/DL — SIGNIFICANT CHANGE UP (ref 32–37)
MCHC RBC-ENTMCNC: 33.2 G/DL — SIGNIFICANT CHANGE UP (ref 32–37)
MCHC RBC-ENTMCNC: 33.2 G/DL — SIGNIFICANT CHANGE UP (ref 32–37)
MCV RBC AUTO: 88.1 FL — SIGNIFICANT CHANGE UP (ref 81–99)
MCV RBC AUTO: 89.9 FL — SIGNIFICANT CHANGE UP (ref 81–99)
MCV RBC AUTO: 90.6 FL — SIGNIFICANT CHANGE UP (ref 81–99)
MONOCYTES # BLD AUTO: 1.12 K/UL — HIGH (ref 0.1–0.6)
MONOCYTES NFR BLD AUTO: 13.2 % — HIGH (ref 1.7–9.3)
NEUTROPHILS # BLD AUTO: 6.06 K/UL — SIGNIFICANT CHANGE UP (ref 1.4–6.5)
NEUTROPHILS NFR BLD AUTO: 71.2 % — SIGNIFICANT CHANGE UP (ref 42.2–75.2)
NRBC # BLD: 0 /100 WBCS — SIGNIFICANT CHANGE UP (ref 0–0)
NRBC # BLD: 0 /100 WBCS — SIGNIFICANT CHANGE UP (ref 0–0)
PHOSPHATE SERPL-MCNC: 3.9 MG/DL — SIGNIFICANT CHANGE UP (ref 2.1–4.9)
PLATELET # BLD AUTO: 178 K/UL — SIGNIFICANT CHANGE UP (ref 130–400)
PLATELET # BLD AUTO: 179 K/UL — SIGNIFICANT CHANGE UP (ref 130–400)
PLATELET # BLD AUTO: 201 K/UL — SIGNIFICANT CHANGE UP (ref 130–400)
POTASSIUM SERPL-MCNC: 4 MMOL/L — SIGNIFICANT CHANGE UP (ref 3.5–5)
POTASSIUM SERPL-SCNC: 4 MMOL/L — SIGNIFICANT CHANGE UP (ref 3.5–5)
PROT SERPL-MCNC: 5.9 G/DL — LOW (ref 6–8)
RBC # BLD: 2.28 M/UL — LOW (ref 4.2–5.4)
RBC # BLD: 2.53 M/UL — LOW (ref 4.2–5.4)
RBC # BLD: 2.66 M/UL — LOW (ref 4.2–5.4)
RBC # FLD: 14.6 % — HIGH (ref 11.5–14.5)
RBC # FLD: 14.6 % — HIGH (ref 11.5–14.5)
RBC # FLD: 15.5 % — HIGH (ref 11.5–14.5)
SODIUM SERPL-SCNC: 139 MMOL/L — SIGNIFICANT CHANGE UP (ref 135–146)
WBC # BLD: 5.83 K/UL — SIGNIFICANT CHANGE UP (ref 4.8–10.8)
WBC # BLD: 8.51 K/UL — SIGNIFICANT CHANGE UP (ref 4.8–10.8)
WBC # BLD: 9.98 K/UL — SIGNIFICANT CHANGE UP (ref 4.8–10.8)
WBC # FLD AUTO: 5.83 K/UL — SIGNIFICANT CHANGE UP (ref 4.8–10.8)
WBC # FLD AUTO: 8.51 K/UL — SIGNIFICANT CHANGE UP (ref 4.8–10.8)
WBC # FLD AUTO: 9.98 K/UL — SIGNIFICANT CHANGE UP (ref 4.8–10.8)

## 2018-08-20 RX ORDER — SOD SULF/SODIUM/NAHCO3/KCL/PEG
4000 SOLUTION, RECONSTITUTED, ORAL ORAL ONCE
Qty: 0 | Refills: 0 | Status: COMPLETED | OUTPATIENT
Start: 2018-08-20 | End: 2018-08-20

## 2018-08-20 RX ADMIN — ATORVASTATIN CALCIUM 20 MILLIGRAM(S): 80 TABLET, FILM COATED ORAL at 22:24

## 2018-08-20 RX ADMIN — ALBUTEROL 2.5 MILLIGRAM(S): 90 AEROSOL, METERED ORAL at 20:23

## 2018-08-20 RX ADMIN — Medication 4000 MILLILITER(S): at 20:23

## 2018-08-20 RX ADMIN — Medication 25 MICROGRAM(S): at 06:18

## 2018-08-20 RX ADMIN — AMLODIPINE BESYLATE 5 MILLIGRAM(S): 2.5 TABLET ORAL at 06:15

## 2018-08-20 RX ADMIN — ALBUTEROL 2.5 MILLIGRAM(S): 90 AEROSOL, METERED ORAL at 10:25

## 2018-08-20 RX ADMIN — ALBUTEROL 2.5 MILLIGRAM(S): 90 AEROSOL, METERED ORAL at 01:03

## 2018-08-20 RX ADMIN — Medication 25 MILLIGRAM(S): at 06:18

## 2018-08-20 RX ADMIN — Medication 10 MILLIGRAM(S): at 06:15

## 2018-08-20 RX ADMIN — Medication 20 MILLIGRAM(S): at 06:15

## 2018-08-20 RX ADMIN — PANTOPRAZOLE SODIUM 10 MG/HR: 20 TABLET, DELAYED RELEASE ORAL at 18:55

## 2018-08-20 RX ADMIN — Medication 25 MILLIGRAM(S): at 17:11

## 2018-08-20 RX ADMIN — Medication 20 MILLIGRAM(S): at 22:24

## 2018-08-20 RX ADMIN — PANTOPRAZOLE SODIUM 10 MG/HR: 20 TABLET, DELAYED RELEASE ORAL at 06:53

## 2018-08-20 RX ADMIN — ALBUTEROL 2.5 MILLIGRAM(S): 90 AEROSOL, METERED ORAL at 14:28

## 2018-08-20 NOTE — CONSULT NOTE ADULT - SUBJECTIVE AND OBJECTIVE BOX
HPI: 79 yo F with PMHx HTN, COPD not on home O2 or BiPAP , hypothyroidism, severe MR, diastolic CHF     presented for bloody BM, fever, wheezing.   She reports that two days ago in the morning she had cleansing tea then had >5 episodes of mucoid stool with blood for 2 days, not associated with abdominal pain, no nausea or vomiting, no bloating, and she got weak and short of breath due to dehydration and hx of MR. She also  reported some wheezing two days ago which resolved, and no chest pain or palpitation, no confusion. She has chronic cough productive of whitish sputum. She denied fever, chills, urinary symptoms, joint pain or swelling.      Past Medical and Surgical History:  PAST MEDICAL & SURGICAL HISTORY:  Other specified hypothyroidism  Severe mitral regurgitation  COPD (chronic obstructive pulmonary disease)  CHF (congestive heart failure)  HTN (hypertension)  History of cholecystectomy      Current Medications:  MEDICATIONS  (STANDING):  ALBUTerol    0.083% 2.5 milliGRAM(s) Nebulizer every 6 hours  amLODIPine   Tablet 5 milliGRAM(s) Oral daily  atorvastatin 20 milliGRAM(s) Oral at bedtime  enalapril 10 milliGRAM(s) Oral daily  furosemide    Tablet 20 milliGRAM(s) Oral daily  levoFLOXacin IVPB 500 milliGRAM(s) IV Intermittent every 24 hours  levothyroxine 25 MICROGram(s) Oral daily  metoprolol tartrate 25 milliGRAM(s) Oral two times a day  pantoprazole Infusion 8 mG/Hr (10 mL/Hr) IV Continuous <Continuous>      Interval History:  No acute events overnight. No complaints at this time.    Vital Signs:  T(F): 97.2 (08-20-18 @ 03:15), Max: 100.1 (08-19-18 @ 13:27)  HR: 70 (08-20-18 @ 05:36) (68 - 83)  BP: 150/81 (08-20-18 @ 05:36) (119/58 - 150/81)  RR: 18 (08-20-18 @ 03:15) (18 - 18)  SpO2: 98% (08-20-18 @ 03:15) (97% - 99%)  CAPILLARY BLOOD GLUCOSE      Physical Exam:  General: Not in distress.   HEENT: Moist mucus membranes. PERRLA.  Cardio: Regular rate and rhythm, S1, S2, no murmur, rub, or gallop.  Pulm: Clear to auscultation bilaterally. No wheezing, rales, or rhonchi.  Abdomen: Soft, non-tender, non-distended. Normoactive bowel sounds.  Extremities: No cyanosis or edema bilaterally. No calf tenderness to palpation.  Neuro: A&O x3. No focal deficits. CN II - XII grossly intact.    Labs and Imaging:  CBC Full  -  ( 20 Aug 2018 00:55 )  WBC Count : 5.83 K/uL  Hemoglobin : 6.7 g/dL  Hematocrit : 20.5 %  Platelet Count - Automated : 201 K/uL  Mean Cell Volume : 89.9 fL  Mean Cell Hemoglobin : 29.4 pg  Mean Cell Hemoglobin Concentration : 32.7 g/dL  Auto Neutrophil # : x  Auto Lymphocyte # : x  Auto Monocyte # : x  Auto Eosinophil # : x  Auto Basophil # : x  Auto Neutrophil % : x  Auto Lymphocyte % : x  Auto Monocyte % : x  Auto Eosinophil % : x  Auto Basophil % : x    RDW: 14.6  14.9    PT/INR/PTT: 08-19-18 @ 15:02  12.40 | 32.7        ^      1.15    BMP: 08-19-18 @ 15:02  138 | 99 | 12   -----------------< 99  4.3  | 24 | 1.0  eGFR(AA): 62, eGFR (non-AA): 54  Ca 8.7, Mg 2.0, P --    LFTs: 08-19-18 @ 15:02  TP  6.8  | 3.9 Alb   ---------------  TB  0.3  | --  DB   ---------------  ALT 12  | 24  AST            ^          53  ALK      LFTs: 08-19-18 @ 15:02  Ca  8.7  | 24 AST   -----------------  TP  6.8  | 12 ALT  -----------------  Alb 3.9  | 53 ALK          ^        0.3         TB      Cardiac Enzymes: Troponin <0.01      Home Medications:  Home Medications:  albuterol 2.5 mg/3 mL (0.083%) inhalation solution: 3 milliliter(s) inhaled every 2 hours, As Needed (14 Feb 2018 16:12)  AMLODIPINE   TAB 5MG:  (14 Feb 2018 22:04)  ATORVASTATIN TAB 20MG:  (14 Feb 2018 22:04)  ENALAPRIL    TAB 10MG:  (14 Feb 2018 22:04)  furosemide 20 mg oral tablet: 20 milligram(s) orally once a day (14 Feb 2018 16:12)  LEVOTHYROXIN TAB 25MCG:  (14 Feb 2018 22:04)  metoprolol tartrate 25 mg oral tablet: 1 tab(s) orally 2 times a day (14 Feb 2018 16:12) HPI: 77 yo F with PMHx HTN, COPD not on home O2 or BiPAP , hypothyroidism, severe MR, diastolic CHF presents s/p 5 episodes of dark, black loose stools associated with chills occurring Saturday evening. Patient attributes symptoms to having consumed a cup of "cleansing green tea" on Friday morning. Patient also endorses having had a whole box of oreos, multiple junk food items and iron pills in the hours preceding her first bowel movement. Patient states her last bowel movement occurred yesterday, stool was more formed however was still dark in coloration. Patient denies similar symptoms in the past, had not drank this tea previously.  Patient denies abdominal pain, bloatedness, fever, nausea, vomiting, no sick contacts or recent travel. Patient denies active bleeding.    CACHORRO exam performed in ED revealed dark brown melena with small amount of BRBPR. No active bleeding.    On admission patient found to have normocytic anemia with hemoglobin of 5.9, baseline hemoglobin around 9 in February. Patient received 1 unit PBRC, hemoglobin as of this morning 7.4.       Past Medical and Surgical History:  PAST MEDICAL & SURGICAL HISTORY:  Other specified hypothyroidism  Severe mitral regurgitation  COPD (chronic obstructive pulmonary disease)  CHF (congestive heart failure)  HTN (hypertension)  History of cholecystectomy      Current Medications:  MEDICATIONS  (STANDING):  ALBUTerol    0.083% 2.5 milliGRAM(s) Nebulizer every 6 hours  amLODIPine   Tablet 5 milliGRAM(s) Oral daily  atorvastatin 20 milliGRAM(s) Oral at bedtime  enalapril 10 milliGRAM(s) Oral daily  furosemide    Tablet 20 milliGRAM(s) Oral daily  levoFLOXacin IVPB 500 milliGRAM(s) IV Intermittent every 24 hours  levothyroxine 25 MICROGram(s) Oral daily  metoprolol tartrate 25 milliGRAM(s) Oral two times a day  pantoprazole Infusion 8 mG/Hr (10 mL/Hr) IV Continuous <Continuous>      Interval History:  No acute events overnight. No complaints at this time.    Vital Signs:  T(F): 97.2 (08-20-18 @ 03:15), Max: 100.1 (08-19-18 @ 13:27)  HR: 70 (08-20-18 @ 05:36) (68 - 83)  BP: 150/81 (08-20-18 @ 05:36) (119/58 - 150/81)  RR: 18 (08-20-18 @ 03:15) (18 - 18)  SpO2: 98% (08-20-18 @ 03:15) (97% - 99%)  CAPILLARY BLOOD GLUCOSE      Physical Exam:  General: Not in distress.   HEENT: Moist mucus membranes. PERRLA.  Cardio: Regular rate and rhythm, S1, S2, no murmur, rub, or gallop.  Pulm: Clear to auscultation bilaterally. No wheezing, rales, or rhonchi.  Abdomen: Soft, non-tender, non-distended. Normoactive bowel sounds.  Extremities: No cyanosis or edema bilaterally. No calf tenderness to palpation.  Neuro: A&O x3. No focal deficits. CN II - XII grossly intact.    Labs and Imaging:  CBC Full  -  ( 20 Aug 2018 00:55 )  WBC Count : 5.83 K/uL  Hemoglobin : 6.7 g/dL  Hematocrit : 20.5 %  Platelet Count - Automated : 201 K/uL  Mean Cell Volume : 89.9 fL  Mean Cell Hemoglobin : 29.4 pg  Mean Cell Hemoglobin Concentration : 32.7 g/dL  Auto Neutrophil # : x  Auto Lymphocyte # : x  Auto Monocyte # : x  Auto Eosinophil # : x  Auto Basophil # : x  Auto Neutrophil % : x  Auto Lymphocyte % : x  Auto Monocyte % : x  Auto Eosinophil % : x  Auto Basophil % : x    RDW: 14.6  14.9    PT/INR/PTT: 08-19-18 @ 15:02  12.40 | 32.7        ^      1.15    BMP: 08-19-18 @ 15:02  138 | 99 | 12   -----------------< 99  4.3  | 24 | 1.0  eGFR(AA): 62, eGFR (non-AA): 54  Ca 8.7, Mg 2.0, P --    LFTs: 08-19-18 @ 15:02  TP  6.8  | 3.9 Alb   ---------------  TB  0.3  | --  DB   ---------------  ALT 12  | 24  AST            ^          53  ALK      LFTs: 08-19-18 @ 15:02  Ca  8.7  | 24 AST   -----------------  TP  6.8  | 12 ALT  -----------------  Alb 3.9  | 53 ALK          ^        0.3         TB      Cardiac Enzymes: Troponin <0.01      Home Medications:  Home Medications:  albuterol 2.5 mg/3 mL (0.083%) inhalation solution: 3 milliliter(s) inhaled every 2 hours, As Needed (14 Feb 2018 16:12)  AMLODIPINE   TAB 5MG:  (14 Feb 2018 22:04)  ATORVASTATIN TAB 20MG:  (14 Feb 2018 22:04)  ENALAPRIL    TAB 10MG:  (14 Feb 2018 22:04)  furosemide 20 mg oral tablet: 20 milligram(s) orally once a day (14 Feb 2018 16:12)  LEVOTHYROXIN TAB 25MCG:  (14 Feb 2018 22:04)  metoprolol tartrate 25 mg oral tablet: 1 tab(s) orally 2 times a day (14 Feb 2018 16:12)

## 2018-08-20 NOTE — CONSULT NOTE ADULT - ASSESSMENT
77 yo F presents with melena 79 yo F presents with 5 episodes of melena.     Suspected UGIB  -s/p 1 unit PRBC with response in hemoglobin from 5.7 to 7.4.  -Patient denies ever having had a colonoscopy or EGD performed, when discussing potential need for procedures patient adamantly refused, stating she does not want anything done, no explanation obtainable from patient  -Will discuss with Attending 79 yo F presents with 5 episodes of melena.     Suspected UGIB  -s/p 1 unit PRBC with response in hemoglobin from 5.7 to 7.4.  -Patient agreeable for EGD/Colonoscopy, plan for 8/21  -Will follow

## 2018-08-20 NOTE — PROGRESS NOTE ADULT - ASSESSMENT
77 yo female PMHx HTN, COPD , hypothyroidism, severe MR, CHF presented for bloody BM.    Melena now resolved: Likely UGIB (baseline Hg 9.3, 5.9 in ED)  - CBC Q12, good response after 1 PRBC  - Awaiting GI f/u, possible endo/colo  - Baseline Hb= 9.3 on 2/16  - Resume ASA when H&H is stable  - f/u GI pcr for shigella/salmonella    Shortness of breath + wheezing: likely due to COPD, hx of MR (BNP neg), off NC  - c/w nebulizers  - c/w PO lasix  - f/u TTE  - hold steroids due to increased of GI bleed or perforation    Hypothyroidism:  - last TSH=13.3 on  4-16  - c/w synthroid    HTN:  - controlled  - c/w meds    DVT ppx: sequential compression devices and start SQ ppx when H&H are stable  Diet: DASH diet  Acitivity: ambulate as tolerated

## 2018-08-20 NOTE — PROGRESS NOTE ADULT - SUBJECTIVE AND OBJECTIVE BOX
24H events:    Melena resolved  No complaints    PAST MEDICAL & SURGICAL HISTORY  Other specified hypothyroidism  Severe mitral regurgitation  COPD (chronic obstructive pulmonary disease)  CHF (congestive heart failure)  HTN (hypertension)  History of cholecystectomy    SOCIAL HISTORY:  Negative for smoking/alcohol/drug use.     ALLERGIES:  No Known Allergies    MEDICATIONS:  STANDING MEDICATIONS  ALBUTerol    0.083% 2.5 milliGRAM(s) Nebulizer every 6 hours  amLODIPine   Tablet 5 milliGRAM(s) Oral daily  atorvastatin 20 milliGRAM(s) Oral at bedtime  enalapril 10 milliGRAM(s) Oral daily  furosemide    Tablet 20 milliGRAM(s) Oral daily  levothyroxine 25 MICROGram(s) Oral daily  metoprolol tartrate 25 milliGRAM(s) Oral two times a day  pantoprazole Infusion 8 mG/Hr IV Continuous <Continuous>    PRN MEDICATIONS    VITALS:   T(F): 97.2  HR: 69  BP: 150/79  RR: 118  SpO2: 97%    LABS:                        7.4    8.51  )-----------( 179      ( 20 Aug 2018 08:24 )             22.3     08-20    139  |  103  |  16  ----------------------------<  105<H>  4.0   |  25  |  0.8    Ca    8.5      20 Aug 2018 08:24  Phos  3.9     08-20  Mg     1.9     08-20    TPro  5.9<L>  /  Alb  3.5  /  TBili  0.4  /  DBili  x   /  AST  12  /  ALT  9   /  AlkPhos  44  08-20    PT/INR - ( 19 Aug 2018 15:02 )   PT: 12.40 sec;   INR: 1.15 ratio         PTT - ( 19 Aug 2018 15:02 )  PTT:32.7 sec          CARDIAC MARKERS ( 19 Aug 2018 15:02 )  x     / <0.01 ng/mL / x     / x     / x          RADIOLOGY:    PHYSICAL EXAM:  GEN: No acute distress  LUNGS: Clear to auscultation bilaterally   HEART: RRR.   ABD: Soft, non-tender, non-distended.   NEURO: AAOX3

## 2018-08-21 LAB
ALBUMIN SERPL ELPH-MCNC: 3.6 G/DL — SIGNIFICANT CHANGE UP (ref 3.5–5.2)
ALP SERPL-CCNC: 46 U/L — SIGNIFICANT CHANGE UP (ref 30–115)
ALT FLD-CCNC: 9 U/L — SIGNIFICANT CHANGE UP (ref 0–41)
ANION GAP SERPL CALC-SCNC: 13 MMOL/L — SIGNIFICANT CHANGE UP (ref 7–14)
APTT BLD: 29 SEC — SIGNIFICANT CHANGE UP (ref 27–39.2)
AST SERPL-CCNC: 14 U/L — SIGNIFICANT CHANGE UP (ref 0–41)
BASOPHILS # BLD AUTO: 0.05 K/UL — SIGNIFICANT CHANGE UP (ref 0–0.2)
BASOPHILS NFR BLD AUTO: 0.7 % — SIGNIFICANT CHANGE UP (ref 0–1)
BILIRUB SERPL-MCNC: 0.4 MG/DL — SIGNIFICANT CHANGE UP (ref 0.2–1.2)
BLD GP AB SCN SERPL QL: SIGNIFICANT CHANGE UP
BUN SERPL-MCNC: 13 MG/DL — SIGNIFICANT CHANGE UP (ref 10–20)
CALCIUM SERPL-MCNC: 8.5 MG/DL — SIGNIFICANT CHANGE UP (ref 8.5–10.1)
CHLORIDE SERPL-SCNC: 104 MMOL/L — SIGNIFICANT CHANGE UP (ref 98–110)
CO2 SERPL-SCNC: 25 MMOL/L — SIGNIFICANT CHANGE UP (ref 17–32)
CREAT SERPL-MCNC: 0.9 MG/DL — SIGNIFICANT CHANGE UP (ref 0.7–1.5)
EOSINOPHIL # BLD AUTO: 0.12 K/UL — SIGNIFICANT CHANGE UP (ref 0–0.7)
EOSINOPHIL NFR BLD AUTO: 1.6 % — SIGNIFICANT CHANGE UP (ref 0–8)
GLUCOSE SERPL-MCNC: 100 MG/DL — HIGH (ref 70–99)
HCT VFR BLD CALC: 21.4 % — LOW (ref 37–47)
HGB BLD-MCNC: 7 G/DL — CRITICAL LOW (ref 12–16)
IMM GRANULOCYTES NFR BLD AUTO: 0.4 % — HIGH (ref 0.1–0.3)
INR BLD: 1.09 RATIO — SIGNIFICANT CHANGE UP (ref 0.65–1.3)
LYMPHOCYTES # BLD AUTO: 1.63 K/UL — SIGNIFICANT CHANGE UP (ref 1.2–3.4)
LYMPHOCYTES # BLD AUTO: 21.7 % — SIGNIFICANT CHANGE UP (ref 20.5–51.1)
MAGNESIUM SERPL-MCNC: 2.1 MG/DL — SIGNIFICANT CHANGE UP (ref 1.8–2.4)
MCHC RBC-ENTMCNC: 29 PG — SIGNIFICANT CHANGE UP (ref 27–31)
MCHC RBC-ENTMCNC: 32.7 G/DL — SIGNIFICANT CHANGE UP (ref 32–37)
MCV RBC AUTO: 88.8 FL — SIGNIFICANT CHANGE UP (ref 81–99)
MONOCYTES # BLD AUTO: 0.63 K/UL — HIGH (ref 0.1–0.6)
MONOCYTES NFR BLD AUTO: 8.4 % — SIGNIFICANT CHANGE UP (ref 1.7–9.3)
NEUTROPHILS # BLD AUTO: 5.06 K/UL — SIGNIFICANT CHANGE UP (ref 1.4–6.5)
NEUTROPHILS NFR BLD AUTO: 67.2 % — SIGNIFICANT CHANGE UP (ref 42.2–75.2)
NRBC # BLD: 0 /100 WBCS — SIGNIFICANT CHANGE UP (ref 0–0)
PLATELET # BLD AUTO: 145 K/UL — SIGNIFICANT CHANGE UP (ref 130–400)
POTASSIUM SERPL-MCNC: 3.9 MMOL/L — SIGNIFICANT CHANGE UP (ref 3.5–5)
POTASSIUM SERPL-SCNC: 3.9 MMOL/L — SIGNIFICANT CHANGE UP (ref 3.5–5)
PROT SERPL-MCNC: 5.9 G/DL — LOW (ref 6–8)
PROTHROM AB SERPL-ACNC: 11.8 SEC — SIGNIFICANT CHANGE UP (ref 9.95–12.87)
RBC # BLD: 2.41 M/UL — LOW (ref 4.2–5.4)
RBC # FLD: 15.2 % — HIGH (ref 11.5–14.5)
SODIUM SERPL-SCNC: 142 MMOL/L — SIGNIFICANT CHANGE UP (ref 135–146)
TYPE + AB SCN PNL BLD: SIGNIFICANT CHANGE UP
WBC # BLD: 7.52 K/UL — SIGNIFICANT CHANGE UP (ref 4.8–10.8)
WBC # FLD AUTO: 7.52 K/UL — SIGNIFICANT CHANGE UP (ref 4.8–10.8)

## 2018-08-21 RX ORDER — MULTIVIT WITH MIN/MFOLATE/K2 340-15/3 G
500 POWDER (GRAM) ORAL
Qty: 0 | Refills: 0 | Status: COMPLETED | OUTPATIENT
Start: 2018-08-21 | End: 2018-08-21

## 2018-08-21 RX ORDER — TIOTROPIUM BROMIDE 18 UG/1
1 CAPSULE ORAL; RESPIRATORY (INHALATION) DAILY
Qty: 0 | Refills: 0 | Status: DISCONTINUED | OUTPATIENT
Start: 2018-08-21 | End: 2018-08-22

## 2018-08-21 RX ORDER — IPRATROPIUM/ALBUTEROL SULFATE 18-103MCG
3 AEROSOL WITH ADAPTER (GRAM) INHALATION EVERY 4 HOURS
Qty: 0 | Refills: 0 | Status: DISCONTINUED | OUTPATIENT
Start: 2018-08-21 | End: 2018-08-22

## 2018-08-21 RX ORDER — ALBUTEROL 90 UG/1
1 AEROSOL, METERED ORAL EVERY 4 HOURS
Qty: 0 | Refills: 0 | Status: DISCONTINUED | OUTPATIENT
Start: 2018-08-21 | End: 2018-08-22

## 2018-08-21 RX ORDER — TIOTROPIUM BROMIDE 18 UG/1
1 CAPSULE ORAL; RESPIRATORY (INHALATION) DAILY
Qty: 0 | Refills: 0 | Status: DISCONTINUED | OUTPATIENT
Start: 2018-08-21 | End: 2018-08-21

## 2018-08-21 RX ORDER — INFLUENZA VIRUS VACCINE 15; 15; 15; 15 UG/.5ML; UG/.5ML; UG/.5ML; UG/.5ML
0.5 SUSPENSION INTRAMUSCULAR ONCE
Qty: 0 | Refills: 0 | Status: COMPLETED | OUTPATIENT
Start: 2018-08-21 | End: 2018-08-21

## 2018-08-21 RX ORDER — MULTIVIT WITH MIN/MFOLATE/K2 340-15/3 G
450 POWDER (GRAM) ORAL
Qty: 0 | Refills: 0 | Status: DISCONTINUED | OUTPATIENT
Start: 2018-08-21 | End: 2018-08-21

## 2018-08-21 RX ADMIN — Medication 20 MILLIGRAM(S): at 05:19

## 2018-08-21 RX ADMIN — PANTOPRAZOLE SODIUM 10 MG/HR: 20 TABLET, DELAYED RELEASE ORAL at 16:28

## 2018-08-21 RX ADMIN — AMLODIPINE BESYLATE 5 MILLIGRAM(S): 2.5 TABLET ORAL at 05:19

## 2018-08-21 RX ADMIN — Medication 10 MILLIGRAM(S): at 05:19

## 2018-08-21 RX ADMIN — Medication 1 ENEMA: at 10:29

## 2018-08-21 RX ADMIN — ALBUTEROL 2.5 MILLIGRAM(S): 90 AEROSOL, METERED ORAL at 14:00

## 2018-08-21 RX ADMIN — ATORVASTATIN CALCIUM 20 MILLIGRAM(S): 80 TABLET, FILM COATED ORAL at 21:25

## 2018-08-21 RX ADMIN — Medication 25 MILLIGRAM(S): at 17:30

## 2018-08-21 RX ADMIN — ALBUTEROL 2.5 MILLIGRAM(S): 90 AEROSOL, METERED ORAL at 08:15

## 2018-08-21 RX ADMIN — Medication 3 MILLILITER(S): at 20:05

## 2018-08-21 RX ADMIN — Medication 25 MICROGRAM(S): at 05:19

## 2018-08-21 RX ADMIN — Medication 25 MILLIGRAM(S): at 05:19

## 2018-08-21 RX ADMIN — Medication 40 MILLIGRAM(S): at 21:16

## 2018-08-21 NOTE — CONSULT NOTE ADULT - ASSESSMENT
anemia, unknown etiology  mitral regurgitation, no CHF, normal EF  COPD exacerbation  pre op risk assessment  moderate ris patient and minor risk pocedure limited functional capacity, generally at moderate risk, able to have the procedure done as soon as wheezing is controlled, no cardiac c/i  current therapy   use albuterol PRN, incase of wheezing anemia, unknown etiology, awaiting GI work up  mitral regurgitation, no CHF, normal EF  COPD exacerbation, still wheezing, but much less than the time of admission  htn: still elevated bp, pre op risk assessment      1. moderate risk patient and minor risk procedure, limited functional capacity, generally at moderate risk, and after 24 hours of further antibiotic therapy and using Albuterol (to manage the wheeze), then can have the procedure done  no cardiac c/i, no new bleeding since admission  2. current therapy but increase Enalapril to 10 mg bid  3. use albuterol PRN, in case of wheezing

## 2018-08-21 NOTE — PROGRESS NOTE ADULT - SUBJECTIVE AND OBJECTIVE BOX
24H events:    Only drank less than half of Go-Lytely last night  Complaint she wants to go home    PAST MEDICAL & SURGICAL HISTORY  Other specified hypothyroidism  Severe mitral regurgitation  COPD (chronic obstructive pulmonary disease)  CHF (congestive heart failure)  HTN (hypertension)  History of cholecystectomy    SOCIAL HISTORY:  Negative for smoking/alcohol/drug use.     ALLERGIES:  No Known Allergies    MEDICATIONS:  STANDING MEDICATIONS  ALBUTerol    0.083% 2.5 milliGRAM(s) Nebulizer every 6 hours  amLODIPine   Tablet 5 milliGRAM(s) Oral daily  atorvastatin 20 milliGRAM(s) Oral at bedtime  enalapril 10 milliGRAM(s) Oral daily  furosemide    Tablet 20 milliGRAM(s) Oral daily  levothyroxine 25 MICROGram(s) Oral daily  magnesium citrate Solution 500 milliLiter(s) Oral every 2 hours  metoprolol tartrate 25 milliGRAM(s) Oral two times a day  pantoprazole Infusion 8 mG/Hr IV Continuous <Continuous>  sodium biphosphate Rectal Enema 1 Enema Rectal once    PRN MEDICATIONS    VITALS:   T(F): 98.1  HR: 60  BP: 113/55  RR: 18  SpO2: 98%    LABS:                        7.0    7.52  )-----------( 145      ( 21 Aug 2018 09:45 )             21.4     08-21    142  |  104  |  13  ----------------------------<  100<H>  3.9   |  25  |  0.9    Ca    8.5      21 Aug 2018 09:45  Phos  3.9     08-20  Mg     2.1     08-21    TPro  5.9<L>  /  Alb  3.6  /  TBili  0.4  /  DBili  x   /  AST  14  /  ALT  9   /  AlkPhos  46  08-21    PT/INR - ( 21 Aug 2018 09:45 )   PT: 11.80 sec;   INR: 1.09 ratio         PTT - ( 21 Aug 2018 09:45 )  PTT:29.0 sec              RADIOLOGY:    PHYSICAL EXAM:  GEN: No acute distress  LUNGS: Clear to auscultation bilaterally   HEART: S1/S2 present. RRR.   ABD: Soft, non-tender, non-distended  NEURO: AAOX3 24H events:    Only drank less than half of Go-Lytely last night  	  PAST MEDICAL & SURGICAL HISTORY  Other specified hypothyroidism  Severe mitral regurgitation  COPD (chronic obstructive pulmonary disease)  CHF (congestive heart failure)  HTN (hypertension)  History of cholecystectomy    SOCIAL HISTORY:  Negative for smoking/alcohol/drug use.     ALLERGIES:  No Known Allergies    MEDICATIONS:  STANDING MEDICATIONS  ALBUTerol    0.083% 2.5 milliGRAM(s) Nebulizer every 6 hours  amLODIPine   Tablet 5 milliGRAM(s) Oral daily  atorvastatin 20 milliGRAM(s) Oral at bedtime  enalapril 10 milliGRAM(s) Oral daily  furosemide    Tablet 20 milliGRAM(s) Oral daily  levothyroxine 25 MICROGram(s) Oral daily  magnesium citrate Solution 500 milliLiter(s) Oral every 2 hours  metoprolol tartrate 25 milliGRAM(s) Oral two times a day  pantoprazole Infusion 8 mG/Hr IV Continuous <Continuous>  sodium biphosphate Rectal Enema 1 Enema Rectal once    PRN MEDICATIONS    VITALS:   T(F): 98.1  HR: 60  BP: 113/55  RR: 18  SpO2: 98%    LABS:                        7.0    7.52  )-----------( 145      ( 21 Aug 2018 09:45 )             21.4     08-21    142  |  104  |  13  ----------------------------<  100<H>  3.9   |  25  |  0.9    Ca    8.5      21 Aug 2018 09:45  Phos  3.9     08-20  Mg     2.1     08-21    TPro  5.9<L>  /  Alb  3.6  /  TBili  0.4  /  DBili  x   /  AST  14  /  ALT  9   /  AlkPhos  46  08-21    PT/INR - ( 21 Aug 2018 09:45 )   PT: 11.80 sec;   INR: 1.09 ratio         PTT - ( 21 Aug 2018 09:45 )  PTT:29.0 sec              RADIOLOGY:    PHYSICAL EXAM:  GEN: No acute distress  LUNGS: Clear to auscultation bilaterally   HEART: S1/S2 present. RRR.   ABD: Soft, non-tender, non-distended  NEURO: AAOX3

## 2018-08-21 NOTE — PROGRESS NOTE ADULT - ASSESSMENT
77 yo female PMHx HTN, COPD , hypothyroidism, severe MR, CHF presented for bloody BM awaiting likely EGD/ colo today.    GI bleed: states both BRPR and melena, had partial prep overnight  (baseline Hg 9.3, 5.9 in ED)  - Prep for scope today  - Hg dropped again to 7 overnight  - Transfuse another unit PRBC  - Cardio clearance  - Resume ASA when H&H is stable  - f/u GI pcr for shigella/salmonella    Shortness of breath: resolved  - c/w nebulizers  - c/w PO lasix  - TTE (severe MR, normal EF, grade 2 diastolic dysfunction)    Hypothyroidism:  - last TSH=13.3 on  4-16  - c/w synthroid    HTN:  - controlled  - c/w meds    DVT ppx: sequential compression devices and start SQ ppx when H&H are stable  Diet: DASH diet  Acitivity: ambulate as tolerated  Dispo: patient and daughter belligerant wanting to go home 79 yo female PMHx HTN, COPD , hypothyroidism, severe MR, CHF presented for bloody BM awaiting likely EGD/ colo today.    GI bleed: states both BRPR and melena, had partial prep overnight  (baseline Hg 9.3, 5.9 in ED)  - Prep for scope today  - Hg dropped again to 7 overnight  - Transfuse another unit PRBC  - Cardio clearance  - Resume ASA when H&H is stable  - f/u GI pcr for shigella/salmonella    COPD: still wheezing, no SOB  - C/w albuterol  - Start LAMA  - Steroids    HFpEF:	  - TTE (severe MR, normal EF, grade 2 diastolic dysfunction)  - PO lasix    Hypothyroidism:  - last TSH=13.3 on  4-16  - c/w synthroid    HTN:  - controlled  - c/w meds    DVT ppx: sequential compression devices and start SQ ppx when H&H are stable  Diet: DASH diet  Acitivity: ambulate as tolerated  Dispo: patient and daughter belligerant wanting to go home

## 2018-08-21 NOTE — PROGRESS NOTE ADULT - ASSESSMENT
77 yo female PMHx HTN, COPD not on home O2 or BiPAP , hypothyroidism, severe MR, CHF presented for bloody BM, fever, wheezing.   She reports that two days ago in the morning she had cleansing tea then had >5 episodes of mucoid stool with blood for 2 days, not associated with abdominal pain, no nausea or vomiting, no bloating, and she got weak and short of breath due to dehydration and hx of MR. She also  reported some wheezing two days ago which resolved, and no chest pain or palpitation, no confusion. She has chronic cough productive of whitish sputum. She denied fever, chills, urinary symptoms, joint pain or swelling.        #UGIB/LGIB-  suspected - npo, IV PPI -  - to go for cscope/egd today - spoke with GI - only srinivasa half go carson - to give mag citrate and fleet enema X 2 each for prep    #acute blood loss anemia - sp transfusion - monitor cbc - transfuse 1 unit prbc today    PMD Dr Alcocer - wants to leave pt on hospitalist service

## 2018-08-21 NOTE — CONSULT NOTE ADULT - SUBJECTIVE AND OBJECTIVE BOX
Patient is a 78y old  Female who presents with a chief complaint of bloody BM, fever, wheezing (19 Aug 2018 20:10)  patient with COPD, MR, normal EF admitted with COPD exacerbation and severe anemia, scheduled for EGD AND COLONOSCOPY, cardiology consult requested fir the pre op cardiac risk assessment  no cardiac event,     HPI:  79 yo female PMHx HTN, COPD not on home O2 or BiPAP , hypothyroidism, severe MR, CHF presented for bloody BM, fever, wheezing.   She reports that two days ago in the morning she had cleansing tea then had >5 episodes of mucoid stool with blood for 2 days, not associated with abdominal pain, no nausea or vomiting, no bloating, and she got weak and short of breath due to dehydration and hx of MR. She also  reported some wheezing two days ago which resolved, and no chest pain or palpitation, no confusion. She has chronic cough productive of whitish sputum. She denied fever, chills, urinary symptoms, joint pain or swelling. (19 Aug 2018 20:10)      PAST MEDICAL & SURGICAL HISTORY:  Other specified hypothyroidism  Severe mitral regurgitation  COPD (chronic obstructive pulmonary disease)  CHF (congestive heart failure)  HTN (hypertension)  History of cholecystectomy      PREVIOUS DIAGNOSTIC TESTING:      ECHO  FINDINGS: < from: Transthoracic Echocardiogram (08.20.18 @ 08:58) >  1. LV Ejection Fraction by Burton's Method with a biplane EF of 64 %.   2. Elevated mean left atrial pressure.   3. Mild to moderately increased left ventricular internal cavity size.   4. Spectral Doppler shows pseudonormal pattern of left ventricular   myocardial filling (Grade II diastolic dysfunction).   5. Moderately enlarged left atrium.   6. Normal right atrial size.   7. Degenerative mitral valve.   8. Severe mitral valve regurgitation.   9. Thickening of the anterior and posterior mitral valve leaflets.  10. Eccentric posteriorly directed Mitral regugitation jet. Vena   contracta width meaured at the parasternal window is 0.76 cm consistane   with severe MR.  11. Moderate tricuspid regurgitation.  12. AV leaflets not well seen on short axis view. Probable mild aortic   sclerosis without stenosis,.  13. Estimated pulmonary artery systolic pressure is 45.4 mmHg assuming a   right atrial pressure of 8 mmHg, which is consistent with mild pulmonary   hypertension.  14. Pulmonary hypertension is present.  15. LA volume Index is 49.0 ml/m² ml/m2.    < end of copied text >      STRESS TEST  FINDINGS:    CATHETERIZATION  FINDINGS:    MEDICATIONS  (STANDING):  ALBUTerol    0.083% 2.5 milliGRAM(s) Nebulizer every 6 hours  amLODIPine   Tablet 5 milliGRAM(s) Oral daily  atorvastatin 20 milliGRAM(s) Oral at bedtime  enalapril 10 milliGRAM(s) Oral daily  furosemide    Tablet 20 milliGRAM(s) Oral daily  levothyroxine 25 MICROGram(s) Oral daily  metoprolol tartrate 25 milliGRAM(s) Oral two times a day  pantoprazole Infusion 8 mG/Hr (10 mL/Hr) IV Continuous <Continuous>    MEDICATIONS  (PRN):      FAMILY HISTORY:  No pertinent family history in first degree relatives      SOCIAL HISTORY:  CIGARETTES: ex smoker  ALCOHOL: no  DRUGS: no                      REVIEW OF SYSTEMS:  CONSTITUTIONAL: No distress, Looks stable  NECK: No pain  RESPIRATORY: cough, wheezing, and shortness of breath  CARDIOVASCULAR: No chest pain, or palpitations, leg swelling  GASTROINTESTINAL: No abdominal or epigastric pain. No nausea, vomiting, or hematemesis;  No melena.  NEUROLOGICAL: No dizziness, headaches, memory loss, loss of strength  SKIN: No itching, burning, rashes, or lesions   ENDOCRINE: No heat or cold intolerance  MUSCULOSKELETAL: No joint pain, No  swelling; No muscle pain  PSYCHIATRIC: No depression, anxiety, mood swings, or difficulty sleeping  ALLERGY: No hives, itching, rash          Vital Signs Last 24 Hrs  T(C): 36.6 (21 Aug 2018 16:21), Max: 36.9 (20 Aug 2018 17:43)  T(F): 97.9 (21 Aug 2018 16:21), Max: 98.5 (21 Aug 2018 05:00)  HR: 80 (21 Aug 2018 16:21) (60 - 80)  BP: 171/81 (21 Aug 2018 16:21) (113/55 - 171/81)  BP(mean): --  RR: 18 (21 Aug 2018 16:21) (18 - 18)  SpO2: 97% (21 Aug 2018 16:21) (97% - 98%)                      PHYSICAL EXAM:  GENERAL: No distress, well developed  HEAD:  Atraumatic, Normocephalic  NECK: Supple, No JVD, No Bruit of either carotid arteries  NERVOUS SYSTEM:  Alert, Awake, Oriented to time, place, person; Normal memory and speech; Normal motor Strength 5/5 B/L upper and lower extremities  CHEST/LUNG: Normal air entry to lung base bilaterally; No wheeze, crackle, rales, rhonchi  HEART: Regular heart beat, S1, A2, P2, No S3, No S4, No gallop, No murmur  ABDOMEN: Soft, Non tender, Non distended; Bowel sounds present  EXTREMITIES:  2+ Peripheral Pulses, No clubbing, No edema  SKIN: No rashes or lesions    TELEMETRY:    ECG: < from: 12 Lead ECG (08.19.18 @ 14:34) >  Normal sinus rhythm  Moderate voltage criteria for LVH, may be normal variant    < end of copied text >      I&O's Detail    21 Aug 2018 07:01  -  21 Aug 2018 16:46  --------------------------------------------------------  IN:    Packed Red Blood Cells: 350 mL    pantoprazole Infusion: 100 mL  Total IN: 450 mL    OUT:  Total OUT: 0 mL    Total NET: 450 mL          LABS:                        7.0    7.52  )-----------( 145      ( 21 Aug 2018 09:45 )             21.4     08-21    142  |  104  |  13  ----------------------------<  100<H>  3.9   |  25  |  0.9    Ca    8.5      21 Aug 2018 09:45  Phos  3.9     08-20  Mg     2.1     08-21    TPro  5.9<L>  /  Alb  3.6  /  TBili  0.4  /  DBili  x   /  AST  14  /  ALT  9   /  AlkPhos  46  08-21        PT/INR - ( 21 Aug 2018 09:45 )   PT: 11.80 sec;   INR: 1.09 ratio         PTT - ( 21 Aug 2018 09:45 )  PTT:29.0 sec    I&O's Summary    21 Aug 2018 07:01  -  21 Aug 2018 16:46  --------------------------------------------------------  IN: 450 mL / OUT: 0 mL / NET: 450 mL        RADIOLOGY & ADDITIONAL STUDIES: < from: Xray Chest 1 View AP/PA (08.19.18 @ 16:17) >    No radiographic evidence of acute pulmonary disease.    < end of copied text > Patient is a 78y old  Female who presents with a chief complaint of bloody BM, fever, wheezing (19 Aug 2018 20:10)  patient with COPD, MR, normal EF admitted with COPD exacerbation and severe anemia, scheduled for EGD AND COLONOSCOPY, cardiology consult requested fir the pre op cardiac risk assessment  no cardiac event,     HPI:  77 yo female PMHx HTN, COPD not on home O2 or BiPAP , hypothyroidism, severe MR, CHF presented for bloody BM, fever, wheezing.   She reports that two days ago in the morning she had cleansing tea then had >5 episodes of mucoid stool with blood for 2 days, not associated with abdominal pain, no nausea or vomiting, no bloating, and she got weak and short of breath due to dehydration and hx of MR. She also  reported some wheezing two days ago which resolved, and no chest pain or palpitation, no confusion. She has chronic cough productive of whitish sputum. She denied fever, chills, urinary symptoms, joint pain or swelling. (19 Aug 2018 20:10)      PAST MEDICAL & SURGICAL HISTORY:  Other specified hypothyroidism  Severe mitral regurgitation  COPD (chronic obstructive pulmonary disease)  CHF (congestive heart failure)  HTN (hypertension)  History of cholecystectomy      PREVIOUS DIAGNOSTIC TESTING:      ECHO  FINDINGS: < from: Transthoracic Echocardiogram (08.20.18 @ 08:58) >  1. LV Ejection Fraction by Burton's Method with a biplane EF of 64 %.   2. Elevated mean left atrial pressure.   3. Mild to moderately increased left ventricular internal cavity size.   4. Spectral Doppler shows pseudonormal pattern of left ventricular   myocardial filling (Grade II diastolic dysfunction).   5. Moderately enlarged left atrium.   6. Normal right atrial size.   7. Degenerative mitral valve.   8. Severe mitral valve regurgitation.   9. Thickening of the anterior and posterior mitral valve leaflets.  10. Eccentric posteriorly directed Mitral regugitation jet. Vena   contracta width meaured at the parasternal window is 0.76 cm consistane   with severe MR.  11. Moderate tricuspid regurgitation.  12. AV leaflets not well seen on short axis view. Probable mild aortic   sclerosis without stenosis,.  13. Estimated pulmonary artery systolic pressure is 45.4 mmHg assuming a   right atrial pressure of 8 mmHg, which is consistent with mild pulmonary   hypertension.  14. Pulmonary hypertension is present.  15. LA volume Index is 49.0 ml/m² ml/m2.    < end of copied text >      STRESS TEST  FINDINGS:    CATHETERIZATION  FINDINGS:    MEDICATIONS  (STANDING):  ALBUTerol    0.083% 2.5 milliGRAM(s) Nebulizer every 6 hours  amLODIPine   Tablet 5 milliGRAM(s) Oral daily  atorvastatin 20 milliGRAM(s) Oral at bedtime  enalapril 10 milliGRAM(s) Oral daily  furosemide    Tablet 20 milliGRAM(s) Oral daily  levothyroxine 25 MICROGram(s) Oral daily  metoprolol tartrate 25 milliGRAM(s) Oral two times a day  pantoprazole Infusion 8 mG/Hr (10 mL/Hr) IV Continuous <Continuous>    MEDICATIONS  (PRN):      FAMILY HISTORY:  No pertinent family history in first degree relatives      SOCIAL HISTORY:  CIGARETTES: ex smoker  ALCOHOL: no  DRUGS: no                      REVIEW OF SYSTEMS:  CONSTITUTIONAL: No distress, Looks stable  NECK: No pain  RESPIRATORY: cough, wheezing, and shortness of breath  CARDIOVASCULAR: No chest pain, or palpitations, or leg swelling  GASTROINTESTINAL: No abdominal or epigastric pain. No nausea, vomiting, or hematemesis;  No melena.  NEUROLOGICAL: No dizziness, headaches, memory loss, loss of strength  SKIN: No itching, burning, rashes, or lesions   ENDOCRINE: No heat or cold intolerance  MUSCULOSKELETAL: No joint pain, No  swelling; No muscle pain  PSYCHIATRIC: No depression, anxiety,   ALLERGY: No hives, itching, rash          Vital Signs Last 24 Hrs  T(C): 36.6 (21 Aug 2018 16:21), Max: 36.9 (20 Aug 2018 17:43)  T(F): 97.9 (21 Aug 2018 16:21), Max: 98.5 (21 Aug 2018 05:00)  HR: 80 (21 Aug 2018 16:21) (60 - 80)  BP: 171/81 (21 Aug 2018 16:21) (113/55 - 171/81)  BP(mean): --  RR: 18 (21 Aug 2018 16:21) (18 - 18)  SpO2: 97% (21 Aug 2018 16:21) (97% - 98%)                      PHYSICAL EXAM:  GENERAL: No distress, well developed  HEAD:  Atraumatic, Normocephalic  NECK: Supple, No JVD, No Bruit of either carotid arteries  NERVOUS SYSTEM:  Alert, Awake, Oriented to time, place, person; Normal memory and speech; Normal motor Strength 5/5 B/L upper and lower extremities  CHEST/LUNG: Normal air entry to lung base bilaterally; scatter wheeze, no crackle, or rales, but rhonchi  HEART: Regular heart beat, S1, A2, P2, No S3, No gallop, murmur 3/6 systolic apical and 2/6 systolic basal   ABDOMEN: Soft, Non tender, Non distended; Bowel sounds present  EXTREMITIES:  2+ Peripheral Pulses, No clubbing, No edema  SKIN: No rashes or lesions    TELEMETRY:    ECG: < from: 12 Lead ECG (08.19.18 @ 14:34) >  Normal sinus rhythm  Moderate voltage criteria for LVH, may be normal variant    < end of copied text >      I&O's Detail    21 Aug 2018 07:01  -  21 Aug 2018 16:46  --------------------------------------------------------  IN:    Packed Red Blood Cells: 350 mL    pantoprazole Infusion: 100 mL  Total IN: 450 mL    OUT:  Total OUT: 0 mL    Total NET: 450 mL          LABS:                        7.0    7.52  )-----------( 145      ( 21 Aug 2018 09:45 )             21.4     08-21    142  |  104  |  13  ----------------------------<  100<H>  3.9   |  25  |  0.9    Ca    8.5      21 Aug 2018 09:45  Phos  3.9     08-20  Mg     2.1     08-21    TPro  5.9<L>  /  Alb  3.6  /  TBili  0.4  /  DBili  x   /  AST  14  /  ALT  9   /  AlkPhos  46  08-21        PT/INR - ( 21 Aug 2018 09:45 )   PT: 11.80 sec;   INR: 1.09 ratio         PTT - ( 21 Aug 2018 09:45 )  PTT:29.0 sec    I&O's Summary    21 Aug 2018 07:01  -  21 Aug 2018 16:46  --------------------------------------------------------  IN: 450 mL / OUT: 0 mL / NET: 450 mL        RADIOLOGY & ADDITIONAL STUDIES: < from: Xray Chest 1 View AP/PA (08.19.18 @ 16:17) >    No radiographic evidence of acute pulmonary disease.    < end of copied text >

## 2018-08-21 NOTE — PROGRESS NOTE ADULT - SUBJECTIVE AND OBJECTIVE BOX
HOSPITALIST ATTENDING NOTE    SCOTTY HANNON  78y Female  [id number]    INTERVAL HPI/OVERNIGHT EVENTS: only drank half go lyte    T(C): 36.7 (08-21-18 @ 08:57), Max: 36.9 (08-20-18 @ 17:43)  HR: 60 (08-21-18 @ 08:57) (60 - 67)  BP: 113/55 (08-21-18 @ 08:57) (113/55 - 128/60)  RR: 18 (08-21-18 @ 08:57) (18 - 18)  SpO2: 98% (08-21-18 @ 08:57) (97% - 98%)  Wt(kg): --      PHYSICAL EXAM:  GENERAL: NAD  HEAD:  Atraumatic, Normocephalic  EYES: EOMI, PERRLA, conjunctiva and sclera clear  ENMT: No tonsillar erythema, exudates, or enlargement  NECK: Supple, No JVD, Normal thyroid  NERVOUS SYSTEM:  Alert & Oriented X3, Good concentration; Non-focal- Motor Strength 5/5 B/L upper and lower extremities;  CHEST/LUNG: Clear to ascultation bilaterally; No rales, rhonchi, wheezing,   HEART: Regular rate and rhythm  ABDOMEN: Soft, Nontender, Nondistended; Bowel sounds present  EXTREMITIES: No clubbing, cyanosis, or edema  LYMPH: No lymphadenopathy noted  SKIN: No rashes or lesions  PSYCH: No suicidal/homicial ideation/hallucinations    Consultant(s) Notes Reviewed:  [x ] YES  [ ] NO  Care Discussed with Consultants/Other Providers/ Housestaff [ x] YES  [ ] NO    LABS:                        7.0    7.52  )-----------( 145      ( 21 Aug 2018 09:45 )             21.4     08-21    142  |  104  |  13  ----------------------------<  100<H>  3.9   |  25  |  0.9    Ca    8.5      21 Aug 2018 09:45  Phos  3.9     08-20  Mg     2.1     08-21    TPro  5.9<L>  /  Alb  3.6  /  TBili  0.4  /  DBili  x   /  AST  14  /  ALT  9   /  AlkPhos  46  08-21          RADIOLOGY & ADDITIONAL TESTS:    Imaging or report Personally Reviewed:  [ ] YES  [ ] NO    Case discussed with resident    Care discussed with pt/family      HEALTH ISSUES - PROBLEM Dx:

## 2018-08-22 ENCOUNTER — TRANSCRIPTION ENCOUNTER (OUTPATIENT)
Age: 78
End: 2018-08-22

## 2018-08-22 ENCOUNTER — RESULT REVIEW (OUTPATIENT)
Age: 78
End: 2018-08-22

## 2018-08-22 VITALS — WEIGHT: 172.18 LBS

## 2018-08-22 LAB
ALBUMIN SERPL ELPH-MCNC: 3.7 G/DL — SIGNIFICANT CHANGE UP (ref 3.5–5.2)
ALP SERPL-CCNC: 46 U/L — SIGNIFICANT CHANGE UP (ref 30–115)
ALT FLD-CCNC: 9 U/L — SIGNIFICANT CHANGE UP (ref 0–41)
ANION GAP SERPL CALC-SCNC: 14 MMOL/L — SIGNIFICANT CHANGE UP (ref 7–14)
AST SERPL-CCNC: 14 U/L — SIGNIFICANT CHANGE UP (ref 0–41)
BASOPHILS # BLD AUTO: 0.01 K/UL — SIGNIFICANT CHANGE UP (ref 0–0.2)
BASOPHILS NFR BLD AUTO: 0.1 % — SIGNIFICANT CHANGE UP (ref 0–1)
BILIRUB SERPL-MCNC: 0.4 MG/DL — SIGNIFICANT CHANGE UP (ref 0.2–1.2)
BUN SERPL-MCNC: 11 MG/DL — SIGNIFICANT CHANGE UP (ref 10–20)
CALCIUM SERPL-MCNC: 8.6 MG/DL — SIGNIFICANT CHANGE UP (ref 8.5–10.1)
CHLORIDE SERPL-SCNC: 104 MMOL/L — SIGNIFICANT CHANGE UP (ref 98–110)
CO2 SERPL-SCNC: 25 MMOL/L — SIGNIFICANT CHANGE UP (ref 17–32)
CREAT SERPL-MCNC: 0.8 MG/DL — SIGNIFICANT CHANGE UP (ref 0.7–1.5)
EOSINOPHIL # BLD AUTO: 0 K/UL — SIGNIFICANT CHANGE UP (ref 0–0.7)
EOSINOPHIL NFR BLD AUTO: 0 % — SIGNIFICANT CHANGE UP (ref 0–8)
GLUCOSE SERPL-MCNC: 124 MG/DL — HIGH (ref 70–99)
HCT VFR BLD CALC: 24 % — LOW (ref 37–47)
HGB BLD-MCNC: 7.9 G/DL — LOW (ref 12–16)
IMM GRANULOCYTES NFR BLD AUTO: 0.4 % — HIGH (ref 0.1–0.3)
LYMPHOCYTES # BLD AUTO: 0.62 K/UL — LOW (ref 1.2–3.4)
LYMPHOCYTES # BLD AUTO: 9.2 % — LOW (ref 20.5–51.1)
MAGNESIUM SERPL-MCNC: 2.1 MG/DL — SIGNIFICANT CHANGE UP (ref 1.8–2.4)
MCHC RBC-ENTMCNC: 29 PG — SIGNIFICANT CHANGE UP (ref 27–31)
MCHC RBC-ENTMCNC: 32.9 G/DL — SIGNIFICANT CHANGE UP (ref 32–37)
MCV RBC AUTO: 88.2 FL — SIGNIFICANT CHANGE UP (ref 81–99)
MONOCYTES # BLD AUTO: 0.32 K/UL — SIGNIFICANT CHANGE UP (ref 0.1–0.6)
MONOCYTES NFR BLD AUTO: 4.8 % — SIGNIFICANT CHANGE UP (ref 1.7–9.3)
NEUTROPHILS # BLD AUTO: 5.73 K/UL — SIGNIFICANT CHANGE UP (ref 1.4–6.5)
NEUTROPHILS NFR BLD AUTO: 85.5 % — HIGH (ref 42.2–75.2)
NRBC # BLD: 0 /100 WBCS — SIGNIFICANT CHANGE UP (ref 0–0)
PLATELET # BLD AUTO: 200 K/UL — SIGNIFICANT CHANGE UP (ref 130–400)
POTASSIUM SERPL-MCNC: 4.4 MMOL/L — SIGNIFICANT CHANGE UP (ref 3.5–5)
POTASSIUM SERPL-SCNC: 4.4 MMOL/L — SIGNIFICANT CHANGE UP (ref 3.5–5)
PROT SERPL-MCNC: 6.1 G/DL — SIGNIFICANT CHANGE UP (ref 6–8)
RBC # BLD: 2.72 M/UL — LOW (ref 4.2–5.4)
RBC # FLD: 14.9 % — HIGH (ref 11.5–14.5)
SODIUM SERPL-SCNC: 143 MMOL/L — SIGNIFICANT CHANGE UP (ref 135–146)
WBC # BLD: 6.71 K/UL — SIGNIFICANT CHANGE UP (ref 4.8–10.8)
WBC # FLD AUTO: 6.71 K/UL — SIGNIFICANT CHANGE UP (ref 4.8–10.8)

## 2018-08-22 RX ORDER — PANTOPRAZOLE SODIUM 20 MG/1
2 TABLET, DELAYED RELEASE ORAL
Qty: 120 | Refills: 2
Start: 2018-08-22 | End: 2018-11-19

## 2018-08-22 RX ADMIN — Medication 3 MILLILITER(S): at 16:04

## 2018-08-22 RX ADMIN — Medication 3 MILLILITER(S): at 08:16

## 2018-08-22 RX ADMIN — Medication 40 MILLIGRAM(S): at 08:03

## 2018-08-22 RX ADMIN — PANTOPRAZOLE SODIUM 10 MG/HR: 20 TABLET, DELAYED RELEASE ORAL at 01:52

## 2018-08-22 RX ADMIN — TIOTROPIUM BROMIDE 1 CAPSULE(S): 18 CAPSULE ORAL; RESPIRATORY (INHALATION) at 07:49

## 2018-08-22 RX ADMIN — Medication 25 MILLIGRAM(S): at 17:25

## 2018-08-22 NOTE — PRE-ANESTHESIA EVALUATION ADULT - NSANTHPEFT_GEN_ALL_CORE
Elderly BF in NAD.  A & O X 3  Chest:  Decreased BLBS, ronchi present, mild bibasilar rales  Cor:  Reg. S1S2 Gr. 2/6 sytolic murmur

## 2018-08-22 NOTE — DISCHARGE NOTE ADULT - PLAN OF CARE
TO Treat/Prevent You had some bleeding but the procedure you did to find out where the bleeding is coming from was only able to show a moderately severe non erosive gastritis: for this GI docs recommends you taking Protonix 40mg two times daily, no NSAIDS( like Ibuprofen, Advil, Motrin, Nuprin) and also follow-up with the [facility] in two months for biopsy results  Also your colonoscopy could not be done due to a poor prep: so follow up in 3 months  -Also follow-up with your primary care doctor in 1-2weeks

## 2018-08-22 NOTE — PRE-ANESTHESIA EVALUATION ADULT - NSANTHADDINFOFT_GEN_ALL_CORE
79 y/o BF evaluated for EGD. ASA 4. Plan IV sedation / GA backup.  Plan, benefits, foreseeable risks, viable alternatives discussed with patient and all her pertinent questions answered and she understands and elects to proceed.

## 2018-08-22 NOTE — DISCHARGE NOTE ADULT - CARE PLAN
Principal Discharge DX:	Gastrointestinal hemorrhage with melena  Goal:	TO Treat/Prevent  Assessment and plan of treatment:	You had some bleeding but the procedure you did to find out where the bleeding is coming from was only able to show a moderately severe non erosive gastritis: for this GI docs recommends you taking Protonix 40mg two times daily, no NSAIDS( like Ibuprofen, Advil, Motrin, Nuprin) and also follow-up with [facility] in two months for biopsy results  Also your colonoscopy could not be done due to a poor prep: so follow up in 3 months  -Also follow-up with your primary care doctor in 1-2weeks

## 2018-08-22 NOTE — DISCHARGE NOTE ADULT - ADDITIONAL INSTRUCTIONS
Follow-up with your PMD in 1-2weeks  Follow-up with GI clinic in 2months for biopsy results and in 3months for Colonoscopy evaluation

## 2018-08-22 NOTE — DISCHARGE NOTE ADULT - CARE PROVIDERS DIRECT ADDRESSES
,[email or web address],DirectAddress_Unknown ,DirectAddress_Unknown,DirectAddress_Unknown

## 2018-08-22 NOTE — CHART NOTE - NSCHARTNOTEFT_GEN_A_CORE
GI Fellow Note :  Scheduled colonoscopic procedure was cancelled on 8/21/2018 as we are still awaiting for cardiac clearance at 4:00 pm.  Will schedule for colonoscopy on 8/22/2018.  NPO After midnight.  2 tap water enemas prior to colonoscopy      Discussed with attending. Informed patient and house staff.

## 2018-08-22 NOTE — DISCHARGE NOTE ADULT - HOSPITAL COURSE
77yo F with Past Medical History HTN, COPD not on home O2 or BiPAP , hypothyroidism, Severe MR, and CHF admitted for hematochezia/acute blood loss anemia. HGB on admission was 5.9, got 3U total RBC on this admission with appropriate response.  EGD showed moderate to severe gastritis, she was sent home with Protonix 40mg BID and follow up for biopsy results in 2 month. Colonoscopy was unsuccessful due to poor prep, follow up in 3 months with GI clinic. D/C Hgb 7.9, patient is stable and wants to go home.

## 2018-08-22 NOTE — PROGRESS NOTE ADULT - ASSESSMENT
77yo F with Past Medical History HTN, COPD not on home O2 or BiPAP , hypothyroidism, Severe MR, and CHF admitted for hematochezia/acute blood loss anemia.    Acute blood loss anemia rule out lower GI bleed: Hgb stable @ 7.9 following transfusion.  Status post bowel prep.  Keep NPO prior to EGD and C-scope scheduled for later this afternoon.  Cardiology CS appreciated.  ASA on hold. The patient is moderate risk for complications.    COPD: improved from admission; continue Spiriva with Albuterol/Atrovent nebulizers.  Chronic Diastolic CHF with severe MR: continue PO Lasix.  Monitor for signs of fluid overload following blood transfusion.  Hypothyroidism: continue Synthroid as directed  Hypertension: continue Enalapril and Norvasc  GI/DVT prophylaxis  Case discussed with housestaff

## 2018-08-22 NOTE — DISCHARGE NOTE ADULT - PATIENT PORTAL LINK FT
You can access the [facility] Portal, offered by [facility], by registering with the following website: [email or web address]

## 2018-08-22 NOTE — PROGRESS NOTE ADULT - SUBJECTIVE AND OBJECTIVE BOX
SCOTTY HANNON MRN-[id number]    Hospitalist Note  77yo F with Past Medical History HTN, COPD not on home O2 or BiPAP , hypothyroidism, Severe MR, and CHF admitted for hematochezia.  Her Hgb was 5.9 on admission.    Overnight events/Updates: Hemoglobin improved to 7.9 following transfusion.  The patient was seen and evaluated by GI, and is currently scheduled to undergo EGD and C-scope later this afternoon.  She completed a bowel prep last night.    Vital Signs Last 24 Hrs  T(C): 36.6 (22 Aug 2018 12:32), Max: 36.6 (21 Aug 2018 16:21)  T(F): 97.9 (22 Aug 2018 12:31), Max: 97.9 (21 Aug 2018 16:21)  HR: 67 (22 Aug 2018 12:32) (61 - 80)  BP: 155/83 (22 Aug 2018 12:32) (116/55 - 171/81)  BP(mean): --  RR: 20 (22 Aug 2018 12:32) (17 - 20)  SpO2: 98% (22 Aug 2018 12:32) (96% - 98%)    Physical Examination:  General: AAO x 3  HEENT: PERRLA, EOMI  CV= S1 & S2 appreciated  Lungs= CTA BL  Abdominal Examination= + BS, Soft, NT/ND  Extremity Examination= No C/C/E    ROS: No chest pain, no shortness of breath.  All other systems reviewed and are within normal limits except for the complaints in the HPI.    MEDICATIONS  (STANDING):  ALBUTerol    0.083% 2.5 milliGRAM(s) Nebulizer every 6 hours  ALBUTerol    90 MICROgram(s) HFA Inhaler 1 Puff(s) Inhalation every 4 hours  ALBUTerol/ipratropium for Nebulization 3 milliLiter(s) Nebulizer every 4 hours  amLODIPine   Tablet 5 milliGRAM(s) Oral daily  atorvastatin 20 milliGRAM(s) Oral at bedtime  enalapril 10 milliGRAM(s) Oral daily  furosemide    Tablet 20 milliGRAM(s) Oral daily  levothyroxine 25 MICROGram(s) Oral daily  metoprolol tartrate 25 milliGRAM(s) Oral two times a day  pantoprazole Infusion 8 mG/Hr (10 mL/Hr) IV Continuous <Continuous>  tiotropium 18 MICROgram(s) Capsule 1 Capsule(s) Inhalation daily    MEDICATIONS  (PRN):                            7.9    6.71  )-----------( 200      ( 22 Aug 2018 07:51 )             24.0     08-22    143  |  104  |  11  ----------------------------<  124<H>  4.4   |  25  |  0.8    Ca    8.6      22 Aug 2018 07:51  Mg     2.1     08-22    TPro  6.1  /  Alb  3.7  /  TBili  0.4  /  DBili  x   /  AST  14  /  ALT  9   /  AlkPhos  46  08-22      Case discussed with housestaff & family  QUINTEN Naik [id number]

## 2018-08-22 NOTE — DISCHARGE NOTE ADULT - MEDICATION SUMMARY - MEDICATIONS TO STOP TAKING
I will STOP taking the medications listed below when I get home from the hospital:    Levaquin 500 mg oral tablet  -- 1 tab(s) by mouth once a day   -- Avoid prolonged or excessive exposure to direct and/or artificial sunlight while taking this medication.  Do not take dairy products, antacids, or iron preparations within one hour of this medication.  Finish all this medication unless otherwise directed by prescriber.  May cause drowsiness or dizziness.  Medication should be taken with plenty of water.

## 2018-08-22 NOTE — DISCHARGE NOTE ADULT - CARE PROVIDER_API CALL
Jas Renner), Internal Medicine  [address] [facility] [address]  [place] [address]  Phone: [phone]  Fax: [phone]    [facility],   [facility]- [facility]  [address] [place] [address]  Phone: (   )    -  Fax: (   )    - [facility],   [facility]  [address] [facility]  Phone: (   )    -  Fax: (   )    -    Parag Díaz  [address]   [address], [place] [address]  Phone: [phone]  Fax: (   )    -

## 2018-08-22 NOTE — DISCHARGE NOTE ADULT - PROVIDER TOKENS
TOKEN:'[id number]',FREE:[LAST:[[facility]],PHONE:[(   )    -],FAX:[(   )    -],ADDRESS:[[facility]- [address]]] FREE:[LAST:[[facility]],PHONE:[(   )    -],FAX:[(   )    -],ADDRESS:[[facility]- [address]]],FREE:[LAST:[DR Venegas],FIRST:[Parag],PHONE:[phone]],FAX:[(   )    -],ADDRESS:[[address] [place] [address]   [address] [address]]]

## 2018-08-23 LAB — SURGICAL PATHOLOGY STUDY: SIGNIFICANT CHANGE UP

## 2018-08-24 LAB — SURGICAL PATHOLOGY STUDY: SIGNIFICANT CHANGE UP

## 2018-08-30 DIAGNOSIS — I11.0 HYPERTENSIVE HEART DISEASE WITH HEART FAILURE: ICD-10-CM

## 2018-08-30 DIAGNOSIS — K57.30 DIVERTICULOSIS OF LARGE INTESTINE WITHOUT PERFORATION OR ABSCESS WITHOUT BLEEDING: ICD-10-CM

## 2018-08-30 DIAGNOSIS — K64.4 RESIDUAL HEMORRHOIDAL SKIN TAGS: ICD-10-CM

## 2018-08-30 DIAGNOSIS — I50.32 CHRONIC DIASTOLIC (CONGESTIVE) HEART FAILURE: ICD-10-CM

## 2018-08-30 DIAGNOSIS — K92.1 MELENA: ICD-10-CM

## 2018-08-30 DIAGNOSIS — D12.5 BENIGN NEOPLASM OF SIGMOID COLON: ICD-10-CM

## 2018-08-30 DIAGNOSIS — Z87.891 PERSONAL HISTORY OF NICOTINE DEPENDENCE: ICD-10-CM

## 2018-08-30 DIAGNOSIS — K29.80 DUODENITIS WITHOUT BLEEDING: ICD-10-CM

## 2018-08-30 DIAGNOSIS — E03.9 HYPOTHYROIDISM, UNSPECIFIED: ICD-10-CM

## 2018-08-30 DIAGNOSIS — D62 ACUTE POSTHEMORRHAGIC ANEMIA: ICD-10-CM

## 2018-08-30 DIAGNOSIS — I34.0 NONRHEUMATIC MITRAL (VALVE) INSUFFICIENCY: ICD-10-CM

## 2018-08-30 DIAGNOSIS — J44.1 CHRONIC OBSTRUCTIVE PULMONARY DISEASE WITH (ACUTE) EXACERBATION: ICD-10-CM

## 2018-08-30 DIAGNOSIS — K25.9 GASTRIC ULCER, UNSPECIFIED AS ACUTE OR CHRONIC, WITHOUT HEMORRHAGE OR PERFORATION: ICD-10-CM

## 2018-08-30 DIAGNOSIS — K29.71 GASTRITIS, UNSPECIFIED, WITH BLEEDING: ICD-10-CM

## 2018-08-30 DIAGNOSIS — D12.2 BENIGN NEOPLASM OF ASCENDING COLON: ICD-10-CM

## 2018-08-30 DIAGNOSIS — E86.1 HYPOVOLEMIA: ICD-10-CM

## 2018-08-30 DIAGNOSIS — K64.8 OTHER HEMORRHOIDS: ICD-10-CM

## 2019-01-30 ENCOUNTER — OUTPATIENT (OUTPATIENT)
Dept: OUTPATIENT SERVICES | Facility: HOSPITAL | Age: 79
LOS: 1 days | Discharge: HOME | End: 2019-01-30

## 2019-01-30 DIAGNOSIS — Z98.890 OTHER SPECIFIED POSTPROCEDURAL STATES: Chronic | ICD-10-CM

## 2019-01-30 DIAGNOSIS — Z00.01 ENCOUNTER FOR GENERAL ADULT MEDICAL EXAMINATION WITH ABNORMAL FINDINGS: ICD-10-CM

## 2019-01-30 DIAGNOSIS — N39.0 URINARY TRACT INFECTION, SITE NOT SPECIFIED: ICD-10-CM

## 2019-01-30 DIAGNOSIS — E11.42 TYPE 2 DIABETES MELLITUS WITH DIABETIC POLYNEUROPATHY: ICD-10-CM

## 2019-01-30 DIAGNOSIS — D50.1 SIDEROPENIC DYSPHAGIA: ICD-10-CM

## 2019-01-30 DIAGNOSIS — E03.4 ATROPHY OF THYROID (ACQUIRED): ICD-10-CM

## 2019-01-30 PROBLEM — E03.8 OTHER SPECIFIED HYPOTHYROIDISM: Chronic | Status: ACTIVE | Noted: 2018-08-19

## 2019-03-21 ENCOUNTER — OUTPATIENT (OUTPATIENT)
Dept: OUTPATIENT SERVICES | Facility: HOSPITAL | Age: 79
LOS: 1 days | Discharge: HOME | End: 2019-03-21

## 2019-03-21 DIAGNOSIS — D50.0 IRON DEFICIENCY ANEMIA SECONDARY TO BLOOD LOSS (CHRONIC): ICD-10-CM

## 2019-03-21 DIAGNOSIS — Z98.890 OTHER SPECIFIED POSTPROCEDURAL STATES: Chronic | ICD-10-CM

## 2019-06-27 ENCOUNTER — OUTPATIENT (OUTPATIENT)
Dept: OUTPATIENT SERVICES | Facility: HOSPITAL | Age: 79
LOS: 1 days | Discharge: HOME | End: 2019-06-27

## 2019-06-27 DIAGNOSIS — D50.0 IRON DEFICIENCY ANEMIA SECONDARY TO BLOOD LOSS (CHRONIC): ICD-10-CM

## 2019-06-27 DIAGNOSIS — Z98.890 OTHER SPECIFIED POSTPROCEDURAL STATES: Chronic | ICD-10-CM

## 2019-06-27 DIAGNOSIS — E03.4 ATROPHY OF THYROID (ACQUIRED): ICD-10-CM

## 2019-08-08 ENCOUNTER — INPATIENT (INPATIENT)
Facility: HOSPITAL | Age: 79
LOS: 6 days | Discharge: HOME | End: 2019-08-15
Attending: INTERNAL MEDICINE | Admitting: INTERNAL MEDICINE
Payer: MEDICARE

## 2019-08-08 VITALS
RESPIRATION RATE: 18 BRPM | TEMPERATURE: 96 F | SYSTOLIC BLOOD PRESSURE: 98 MMHG | OXYGEN SATURATION: 97 % | DIASTOLIC BLOOD PRESSURE: 57 MMHG | HEART RATE: 67 BPM | WEIGHT: 166.89 LBS

## 2019-08-08 DIAGNOSIS — Z98.890 OTHER SPECIFIED POSTPROCEDURAL STATES: Chronic | ICD-10-CM

## 2019-08-08 PROCEDURE — 99285 EMERGENCY DEPT VISIT HI MDM: CPT

## 2019-08-08 NOTE — ED ADULT TRIAGE NOTE - CHIEF COMPLAINT QUOTE
Patient BIBA from home for bloody stools that started this morning. Patient also reports lower abdominal pain and vomiting. Patient is on Aspirin. Patient BIBA from home for bloody stools that started this morning. Patient also reports lower abdominal pain and vomiting. BP on scene was 105/57. Patient is on Aspirin.

## 2019-08-09 LAB
ALBUMIN SERPL ELPH-MCNC: 3.1 G/DL — LOW (ref 3.5–5.2)
ALBUMIN SERPL ELPH-MCNC: 3.2 G/DL — LOW (ref 3.5–5.2)
ALP SERPL-CCNC: 46 U/L — SIGNIFICANT CHANGE UP (ref 30–115)
ALP SERPL-CCNC: 53 U/L — SIGNIFICANT CHANGE UP (ref 30–115)
ALT FLD-CCNC: 12 U/L — SIGNIFICANT CHANGE UP (ref 0–41)
ALT FLD-CCNC: 15 U/L — SIGNIFICANT CHANGE UP (ref 0–41)
ANION GAP SERPL CALC-SCNC: 12 MMOL/L — SIGNIFICANT CHANGE UP (ref 7–14)
ANION GAP SERPL CALC-SCNC: 9 MMOL/L — SIGNIFICANT CHANGE UP (ref 7–14)
APTT BLD: 28.2 SEC — SIGNIFICANT CHANGE UP (ref 27–39.2)
AST SERPL-CCNC: 15 U/L — SIGNIFICANT CHANGE UP (ref 0–41)
AST SERPL-CCNC: 40 U/L — SIGNIFICANT CHANGE UP (ref 0–41)
BASOPHILS # BLD AUTO: 0.05 K/UL — SIGNIFICANT CHANGE UP (ref 0–0.2)
BASOPHILS NFR BLD AUTO: 0.7 % — SIGNIFICANT CHANGE UP (ref 0–1)
BILIRUB SERPL-MCNC: 0.3 MG/DL — SIGNIFICANT CHANGE UP (ref 0.2–1.2)
BILIRUB SERPL-MCNC: <0.2 MG/DL — SIGNIFICANT CHANGE UP (ref 0.2–1.2)
BLD GP AB SCN SERPL QL: SIGNIFICANT CHANGE UP
BUN SERPL-MCNC: 15 MG/DL — SIGNIFICANT CHANGE UP (ref 10–20)
BUN SERPL-MCNC: 21 MG/DL — HIGH (ref 10–20)
CALCIUM SERPL-MCNC: 8.3 MG/DL — LOW (ref 8.5–10.1)
CALCIUM SERPL-MCNC: 8.4 MG/DL — LOW (ref 8.5–10.1)
CHLORIDE SERPL-SCNC: 103 MMOL/L — SIGNIFICANT CHANGE UP (ref 98–110)
CHLORIDE SERPL-SCNC: 106 MMOL/L — SIGNIFICANT CHANGE UP (ref 98–110)
CO2 SERPL-SCNC: 24 MMOL/L — SIGNIFICANT CHANGE UP (ref 17–32)
CO2 SERPL-SCNC: 25 MMOL/L — SIGNIFICANT CHANGE UP (ref 17–32)
CREAT SERPL-MCNC: 0.8 MG/DL — SIGNIFICANT CHANGE UP (ref 0.7–1.5)
CREAT SERPL-MCNC: 0.9 MG/DL — SIGNIFICANT CHANGE UP (ref 0.7–1.5)
EOSINOPHIL # BLD AUTO: 0.14 K/UL — SIGNIFICANT CHANGE UP (ref 0–0.7)
EOSINOPHIL NFR BLD AUTO: 1.9 % — SIGNIFICANT CHANGE UP (ref 0–8)
GLUCOSE SERPL-MCNC: 137 MG/DL — HIGH (ref 70–99)
GLUCOSE SERPL-MCNC: 85 MG/DL — SIGNIFICANT CHANGE UP (ref 70–99)
HCT VFR BLD CALC: 20.9 % — LOW (ref 37–47)
HCT VFR BLD CALC: 26.8 % — LOW (ref 37–47)
HCT VFR BLD CALC: 27.3 % — LOW (ref 37–47)
HGB BLD-MCNC: 6.4 G/DL — CRITICAL LOW (ref 12–16)
HGB BLD-MCNC: 8.7 G/DL — LOW (ref 12–16)
HGB BLD-MCNC: 8.8 G/DL — LOW (ref 12–16)
IMM GRANULOCYTES NFR BLD AUTO: 0.4 % — HIGH (ref 0.1–0.3)
INR BLD: 1.11 RATIO — SIGNIFICANT CHANGE UP (ref 0.65–1.3)
LACTATE BLDV-MCNC: 1.5 MMOL/L — SIGNIFICANT CHANGE UP (ref 0.5–1.6)
LDH SERPL L TO P-CCNC: 458 — HIGH (ref 50–242)
LYMPHOCYTES # BLD AUTO: 1.71 K/UL — SIGNIFICANT CHANGE UP (ref 1.2–3.4)
LYMPHOCYTES # BLD AUTO: 23.2 % — SIGNIFICANT CHANGE UP (ref 20.5–51.1)
MCHC RBC-ENTMCNC: 29.4 PG — SIGNIFICANT CHANGE UP (ref 27–31)
MCHC RBC-ENTMCNC: 29.8 PG — SIGNIFICANT CHANGE UP (ref 27–31)
MCHC RBC-ENTMCNC: 29.8 PG — SIGNIFICANT CHANGE UP (ref 27–31)
MCHC RBC-ENTMCNC: 30.6 G/DL — LOW (ref 32–37)
MCHC RBC-ENTMCNC: 32.2 G/DL — SIGNIFICANT CHANGE UP (ref 32–37)
MCHC RBC-ENTMCNC: 32.5 G/DL — SIGNIFICANT CHANGE UP (ref 32–37)
MCV RBC AUTO: 91.8 FL — SIGNIFICANT CHANGE UP (ref 81–99)
MCV RBC AUTO: 92.5 FL — SIGNIFICANT CHANGE UP (ref 81–99)
MCV RBC AUTO: 95.9 FL — SIGNIFICANT CHANGE UP (ref 81–99)
MONOCYTES # BLD AUTO: 0.74 K/UL — HIGH (ref 0.1–0.6)
MONOCYTES NFR BLD AUTO: 10 % — HIGH (ref 1.7–9.3)
NEUTROPHILS # BLD AUTO: 4.7 K/UL — SIGNIFICANT CHANGE UP (ref 1.4–6.5)
NEUTROPHILS NFR BLD AUTO: 63.8 % — SIGNIFICANT CHANGE UP (ref 42.2–75.2)
NRBC # BLD: 0 /100 WBCS — SIGNIFICANT CHANGE UP (ref 0–0)
PLATELET # BLD AUTO: 123 K/UL — LOW (ref 130–400)
PLATELET # BLD AUTO: 128 K/UL — LOW (ref 130–400)
PLATELET # BLD AUTO: 145 K/UL — SIGNIFICANT CHANGE UP (ref 130–400)
POTASSIUM SERPL-MCNC: 3.4 MMOL/L — LOW (ref 3.5–5)
POTASSIUM SERPL-MCNC: 4.5 MMOL/L — SIGNIFICANT CHANGE UP (ref 3.5–5)
POTASSIUM SERPL-SCNC: 3.4 MMOL/L — LOW (ref 3.5–5)
POTASSIUM SERPL-SCNC: 4.5 MMOL/L — SIGNIFICANT CHANGE UP (ref 3.5–5)
PROT SERPL-MCNC: 6 G/DL — SIGNIFICANT CHANGE UP (ref 6–8)
PROT SERPL-MCNC: 6.3 G/DL — SIGNIFICANT CHANGE UP (ref 6–8)
PROTHROM AB SERPL-ACNC: 12.7 SEC — SIGNIFICANT CHANGE UP (ref 9.95–12.87)
RBC # BLD: 2.18 M/UL — LOW (ref 4.2–5.4)
RBC # BLD: 2.92 M/UL — LOW (ref 4.2–5.4)
RBC # BLD: 2.95 M/UL — LOW (ref 4.2–5.4)
RBC # FLD: 15 % — HIGH (ref 11.5–14.5)
RBC # FLD: 15.8 % — HIGH (ref 11.5–14.5)
RBC # FLD: 16 % — HIGH (ref 11.5–14.5)
SODIUM SERPL-SCNC: 137 MMOL/L — SIGNIFICANT CHANGE UP (ref 135–146)
SODIUM SERPL-SCNC: 142 MMOL/L — SIGNIFICANT CHANGE UP (ref 135–146)
TROPONIN T SERPL-MCNC: <0.01 NG/ML — SIGNIFICANT CHANGE UP
TROPONIN T SERPL-MCNC: <0.01 NG/ML — SIGNIFICANT CHANGE UP
WBC # BLD: 6.26 K/UL — SIGNIFICANT CHANGE UP (ref 4.8–10.8)
WBC # BLD: 7.09 K/UL — SIGNIFICANT CHANGE UP (ref 4.8–10.8)
WBC # BLD: 7.37 K/UL — SIGNIFICANT CHANGE UP (ref 4.8–10.8)
WBC # FLD AUTO: 6.26 K/UL — SIGNIFICANT CHANGE UP (ref 4.8–10.8)
WBC # FLD AUTO: 7.09 K/UL — SIGNIFICANT CHANGE UP (ref 4.8–10.8)
WBC # FLD AUTO: 7.37 K/UL — SIGNIFICANT CHANGE UP (ref 4.8–10.8)

## 2019-08-09 PROCEDURE — 71045 X-RAY EXAM CHEST 1 VIEW: CPT | Mod: 26

## 2019-08-09 PROCEDURE — 93010 ELECTROCARDIOGRAM REPORT: CPT

## 2019-08-09 PROCEDURE — 99222 1ST HOSP IP/OBS MODERATE 55: CPT

## 2019-08-09 RX ORDER — BUDESONIDE AND FORMOTEROL FUMARATE DIHYDRATE 160; 4.5 UG/1; UG/1
2 AEROSOL RESPIRATORY (INHALATION)
Refills: 0 | Status: DISCONTINUED | OUTPATIENT
Start: 2019-08-09 | End: 2019-08-15

## 2019-08-09 RX ORDER — CHLORHEXIDINE GLUCONATE 213 G/1000ML
1 SOLUTION TOPICAL
Refills: 0 | Status: DISCONTINUED | OUTPATIENT
Start: 2019-08-09 | End: 2019-08-15

## 2019-08-09 RX ORDER — IPRATROPIUM/ALBUTEROL SULFATE 18-103MCG
3 AEROSOL WITH ADAPTER (GRAM) INHALATION EVERY 6 HOURS
Refills: 0 | Status: DISCONTINUED | OUTPATIENT
Start: 2019-08-09 | End: 2019-08-15

## 2019-08-09 RX ORDER — PANTOPRAZOLE SODIUM 20 MG/1
80 TABLET, DELAYED RELEASE ORAL ONCE
Refills: 0 | Status: COMPLETED | OUTPATIENT
Start: 2019-08-09 | End: 2019-08-09

## 2019-08-09 RX ORDER — ALBUTEROL 90 UG/1
2.5 AEROSOL, METERED ORAL ONCE
Refills: 0 | Status: COMPLETED | OUTPATIENT
Start: 2019-08-09 | End: 2019-08-09

## 2019-08-09 RX ORDER — FUROSEMIDE 40 MG
20 TABLET ORAL
Qty: 0 | Refills: 0 | DISCHARGE

## 2019-08-09 RX ORDER — PANTOPRAZOLE SODIUM 20 MG/1
8 TABLET, DELAYED RELEASE ORAL
Qty: 80 | Refills: 0 | Status: DISCONTINUED | OUTPATIENT
Start: 2019-08-09 | End: 2019-08-10

## 2019-08-09 RX ORDER — ONDANSETRON 8 MG/1
4 TABLET, FILM COATED ORAL ONCE
Refills: 0 | Status: COMPLETED | OUTPATIENT
Start: 2019-08-09 | End: 2019-08-09

## 2019-08-09 RX ORDER — ATORVASTATIN CALCIUM 80 MG/1
20 TABLET, FILM COATED ORAL AT BEDTIME
Refills: 0 | Status: DISCONTINUED | OUTPATIENT
Start: 2019-08-09 | End: 2019-08-15

## 2019-08-09 RX ORDER — LEVOTHYROXINE SODIUM 125 MCG
25 TABLET ORAL DAILY
Refills: 0 | Status: DISCONTINUED | OUTPATIENT
Start: 2019-08-09 | End: 2019-08-15

## 2019-08-09 RX ADMIN — ONDANSETRON 4 MILLIGRAM(S): 8 TABLET, FILM COATED ORAL at 01:23

## 2019-08-09 RX ADMIN — Medication 3 MILLILITER(S): at 18:46

## 2019-08-09 RX ADMIN — BUDESONIDE AND FORMOTEROL FUMARATE DIHYDRATE 2 PUFF(S): 160; 4.5 AEROSOL RESPIRATORY (INHALATION) at 11:28

## 2019-08-09 RX ADMIN — ALBUTEROL 2.5 MILLIGRAM(S): 90 AEROSOL, METERED ORAL at 04:01

## 2019-08-09 RX ADMIN — PANTOPRAZOLE SODIUM 80 MILLIGRAM(S): 20 TABLET, DELAYED RELEASE ORAL at 02:40

## 2019-08-09 RX ADMIN — PANTOPRAZOLE SODIUM 10 MG/HR: 20 TABLET, DELAYED RELEASE ORAL at 02:43

## 2019-08-09 RX ADMIN — Medication 25 MICROGRAM(S): at 06:34

## 2019-08-09 RX ADMIN — ATORVASTATIN CALCIUM 20 MILLIGRAM(S): 80 TABLET, FILM COATED ORAL at 22:28

## 2019-08-09 RX ADMIN — BUDESONIDE AND FORMOTEROL FUMARATE DIHYDRATE 2 PUFF(S): 160; 4.5 AEROSOL RESPIRATORY (INHALATION) at 22:27

## 2019-08-09 NOTE — CONSULT NOTE ADULT - SUBJECTIVE AND OBJECTIVE BOX
Patient is a 79y old  Female who presents with a chief complaint of GIB (09 Aug 2019 04:41)      HPI:    80 yo F w/ hx of GIB, PUD, diverticulosis, hemorrhoids, HTN, COPD, severe MR, HFpEF, hypothyroidism p/w GIB. Pt had 1 episode of "burgundy" colored liquid in toilet bowel with formed stools, then another episode at night time. Pt admits to taking 4 baby aspirin the day before for back pain. Pt admits to lightheadedness and EMS found BP of 80/50 by EMS. Pt had hx of GIB Aug 2018, EGD significant for gastritis, duodenitis, and PUD, colonoscopy showed diverticulosis, polyps, and internal/external hemorrhoids. She was to follow up with repeat colonoscopy in 3 months but never did.    In ED, hgb down to 6.4 from baseline of 10 in June 2019, rectal exam showed streak of red blood, placed on Protonix drip and given 2u PRBC (09 Aug 2019 04:41)    s/p 2 units prbc, no bloody while in ED    Allergies    No Known Allergies    Intolerances      Home Medications:  albuterol 2.5 mg/3 mL (0.083%) inhalation solution: 3 milliliter(s) inhaled every 2 hours, As Needed (09 Aug 2019 04:36)  AMLODIPINE   TAB 5MG:  (09 Aug 2019 04:36)  aspirin 81 mg oral tablet, chewable: 1 tab(s) orally once a day (09 Aug 2019 04:36)  ATORVASTATIN TAB 20MG:  (09 Aug 2019 04:36)  Breo Ellipta 100 mcg-25 mcg/inh inhalation powder: 1 puff(s) inhaled once a day (09 Aug 2019 04:36)  ENALAPRIL    TAB 10MG:  (09 Aug 2019 04:36)  furosemide 40 mg oral tablet: 1 tab(s) orally once a day (09 Aug 2019 04:36)  LEVOTHYROXIN TAB 25MCG:  (09 Aug 2019 04:36)  metoprolol tartrate 25 mg oral tablet: 1 tab(s) orally 2 times a day (09 Aug 2019 04:36)      SOCIAL HX:     Smoking never smoker          ETOH/Illicit drugs denies             PAST MEDICAL & SURGICAL HISTORY:  Other specified hypothyroidism  Severe mitral regurgitation  COPD (chronic obstructive pulmonary disease)  CHF (congestive heart failure)  HTN (hypertension)  History of cholecystectomy      FAMILY HISTORY:  No pertinent family history in first degree relatives  :    No known cardiovascular or pulmonary family history     ROS:  See HPI     PHYSICAL EXAM    ICU Vital Signs Last 24 Hrs  T(C): 36.8 (09 Aug 2019 06:46), Max: 36.9 (09 Aug 2019 04:51)  T(F): 98.3 (09 Aug 2019 06:46), Max: 98.4 (09 Aug 2019 04:51)  HR: 69 (09 Aug 2019 06:46) (58 - 72)  BP: 114/55 (09 Aug 2019 06:46) (94/51 - 118/64)  RR: 20 (09 Aug 2019 06:46) (18 - 20)  SpO2: 98% (09 Aug 2019 06:46) (96% - 98%) RA      General: Not in distress  HEENT:  TRACE              Lymphatic system: No LN  Lungs: Bilateral BS CTA  Cardiovascular: Regular  Gastrointestinal: Soft, Positive BS  Musculoskeletal: No clubbing.  Moves all extremities.  no edema  Skin: Warm.  Intact  Neurological: No motor or sensory deficit         LABS:                          6.4    7.37  )-----------( 145      ( 09 Aug 2019 00:30 )             20.9          baseline Hb 8                                         137  |  103  |  21<H>  ----------------------------<  137<H>  4.5   |  25  |  0.9    Ca    8.4<L>      09 Aug 2019 00:30    TPro  6.3  /  Alb  3.1<L>  /  TBili  <0.2  /  DBili  x   /  AST  40  /  ALT  15  /  AlkPhos  46  08-09      PT/INR - ( 09 Aug 2019 00:30 )   PT: 12.70 sec;   INR: 1.11 ratio         PTT - ( 09 Aug 2019 00:30 )  PTT:28.2 sec                                           CARDIAC MARKERS ( 09 Aug 2019 01:40 )  x     / <0.01 ng/mL / x     / x     / x                                                LIVER FUNCTIONS - ( 09 Aug 2019 00:30 )  Alb: 3.1 g/dL / Pro: 6.3 g/dL / ALK PHOS: 46 U/L / ALT: 15 U/L / AST: 40 U/L / GGT: x                                                                              CXR: no infiltrates, cardiomegaly    < from: Transthoracic Echocardiogram (08.20.18 @ 08:58) >  Summary:   1. LV Ejection Fraction by Burton's Method with a biplane EF of 64 %.   2. Elevated mean left atrial pressure.   3. Mild to moderately increased left ventricular internal cavity size.   4. Spectral Doppler shows pseudonormal pattern of left ventricular   myocardial filling (Grade II diastolic dysfunction).   5. Moderately enlarged left atrium.   6. Normal right atrial size.   7. Degenerative mitral valve.   8. Severe mitral valve regurgitation.   9. Thickening of the anterior and posterior mitral valve leaflets.  10. Eccentric posteriorly directed Mitral regugitation jet. Vena   contracta width meaured at the parasternal window is 0.76 cm consistane   with severe MR.  11. Moderate tricuspid regurgitation.  12. AV leaflets not well seen on short axis view. Probable mild aortic   sclerosis without stenosis,.  13. Estimated pulmonary artery systolic pressure is 45.4 mmHg assuming a   right atrial pressure of 8 mmHg, which is consistent with mild pulmonary   hypertension.  14. Pulmonary hypertension is present.  15. LA volume Index is 49.0 ml/m² ml/m2.    < end of copied text >      MEDICATIONS  (STANDING):  atorvastatin 20 milliGRAM(s) Oral at bedtime  buDESOnide  80 MICROgram(s)/formoterol 4.5 MICROgram(s) Inhaler 2 Puff(s) Inhalation two times a day  chlorhexidine 4% Liquid 1 Application(s) Topical <User Schedule>  levothyroxine 25 MICROGram(s) Oral daily  pantoprazole Infusion 8 mG/Hr (10 mL/Hr) IV Continuous <Continuous>    MEDICATIONS  (PRN):  ALBUTerol/ipratropium for Nebulization 3 milliLiter(s) Nebulizer every 6 hours PRN Shortness of Breath and/or Wheezing Patient is a 79y old  Female who presents with a chief complaint of GIB (09 Aug 2019 04:41)      HPI:    78 yo F w/ hx of GIB, PUD, diverticulosis, hemorrhoids, HTN, COPD, severe MR, HFpEF, hypothyroidism p/w GIB. Pt had 1 episode of "burgundy" colored liquid in toilet bowel with formed stools, then another episode at night time. Pt admits to taking 4 baby aspirin the day before for back pain. Pt admits to lightheadedness and EMS found BP of 80/50 by EMS. Pt had hx of GIB Aug 2018, EGD significant for gastritis, duodenitis, and PUD, colonoscopy showed diverticulosis, polyps, and internal/external hemorrhoids. She was to follow up with repeat colonoscopy in 3 months but never did.    In ED, hgb down to 6.4 from baseline of 10 in June 2019, rectal exam showed streak of red blood, placed on Protonix drip and given 2u PRBC (09 Aug 2019 04:41)    s/p 2 units prbc, no bloody while in ED    Allergies    No Known Allergies    Intolerances      Home Medications:  albuterol 2.5 mg/3 mL (0.083%) inhalation solution: 3 milliliter(s) inhaled every 2 hours, As Needed (09 Aug 2019 04:36)  AMLODIPINE   TAB 5MG:  (09 Aug 2019 04:36)  aspirin 81 mg oral tablet, chewable: 1 tab(s) orally once a day (09 Aug 2019 04:36)  ATORVASTATIN TAB 20MG:  (09 Aug 2019 04:36)  Breo Ellipta 100 mcg-25 mcg/inh inhalation powder: 1 puff(s) inhaled once a day (09 Aug 2019 04:36)  ENALAPRIL    TAB 10MG:  (09 Aug 2019 04:36)  furosemide 40 mg oral tablet: 1 tab(s) orally once a day (09 Aug 2019 04:36)  LEVOTHYROXIN TAB 25MCG:  (09 Aug 2019 04:36)  metoprolol tartrate 25 mg oral tablet: 1 tab(s) orally 2 times a day (09 Aug 2019 04:36)      SOCIAL HX:     Smoking never smoker          ETOH/Illicit drugs denies             PAST MEDICAL & SURGICAL HISTORY:  Other specified hypothyroidism  Severe mitral regurgitation  COPD (chronic obstructive pulmonary disease)  CHF (congestive heart failure)  HTN (hypertension)  History of cholecystectomy      FAMILY HISTORY:  No pertinent family history in first degree relatives  :    No known cardiovascular or pulmonary family history     ROS:  See HPI     PHYSICAL EXAM    ICU Vital Signs Last 24 Hrs  T(C): 36.8 (09 Aug 2019 06:46), Max: 36.9 (09 Aug 2019 04:51)  T(F): 98.3 (09 Aug 2019 06:46), Max: 98.4 (09 Aug 2019 04:51)  HR: 69 (09 Aug 2019 06:46) (58 - 72)  BP: 114/55 (09 Aug 2019 06:46) (94/51 - 118/64)  RR: 20 (09 Aug 2019 06:46) (18 - 20)  SpO2: 98% (09 Aug 2019 06:46) (96% - 98%) RA      General: Not in distress  HEENT:  TRACE              Lymphatic system: No LN  Lungs: Bilateral BS CTA  Cardiovascular: Regular  Gastrointestinal: Soft, Positive BS  Musculoskeletal: No clubbing.  Moves all extremities.  no edema  Skin: Warm.  Intact  Neurological: No motor or sensory deficit         LABS:                          6.4    7.37  )-----------( 145      ( 09 Aug 2019 00:30 )	SP 2 Units PRBCs             20.9          baseline Hb 8                                         137  |  103  |  21<H>  ----------------------------<  137<H>  4.5   |  25  |  0.9    Ca    8.4<L>      09 Aug 2019 00:30    TPro  6.3  /  Alb  3.1<L>  /  TBili  <0.2  /  DBili  x   /  AST  40  /  ALT  15  /  AlkPhos  46  08-09      PT/INR - ( 09 Aug 2019 00:30 )   PT: 12.70 sec;   INR: 1.11 ratio         PTT - ( 09 Aug 2019 00:30 )  PTT:28.2 sec                                           CARDIAC MARKERS ( 09 Aug 2019 01:40 )  x     / <0.01 ng/mL / x     / x     / x                                                LIVER FUNCTIONS - ( 09 Aug 2019 00:30 )  Alb: 3.1 g/dL / Pro: 6.3 g/dL / ALK PHOS: 46 U/L / ALT: 15 U/L / AST: 40 U/L / GGT: x                                                                              CXR: no infiltrates, cardiomegaly    < from: Transthoracic Echocardiogram (08.20.18 @ 08:58) >  Summary:   1. LV Ejection Fraction by Burton's Method with a biplane EF of 64 %.   2. Elevated mean left atrial pressure.   3. Mild to moderately increased left ventricular internal cavity size.   4. Spectral Doppler shows pseudonormal pattern of left ventricular   myocardial filling (Grade II diastolic dysfunction).   5. Moderately enlarged left atrium.   6. Normal right atrial size.   7. Degenerative mitral valve.   8. Severe mitral valve regurgitation.   9. Thickening of the anterior and posterior mitral valve leaflets.  10. Eccentric posteriorly directed Mitral regugitation jet. Vena   contracta width meaured at the parasternal window is 0.76 cm consistane   with severe MR.  11. Moderate tricuspid regurgitation.  12. AV leaflets not well seen on short axis view. Probable mild aortic   sclerosis without stenosis,.  13. Estimated pulmonary artery systolic pressure is 45.4 mmHg assuming a   right atrial pressure of 8 mmHg, which is consistent with mild pulmonary   hypertension.  14. Pulmonary hypertension is present.  15. LA volume Index is 49.0 ml/m² ml/m2.    < end of copied text >      MEDICATIONS  (STANDING):  atorvastatin 20 milliGRAM(s) Oral at bedtime  buDESOnide  80 MICROgram(s)/formoterol 4.5 MICROgram(s) Inhaler 2 Puff(s) Inhalation two times a day  chlorhexidine 4% Liquid 1 Application(s) Topical <User Schedule>  levothyroxine 25 MICROGram(s) Oral daily  pantoprazole Infusion 8 mG/Hr (10 mL/Hr) IV Continuous <Continuous>    MEDICATIONS  (PRN):  ALBUTerol/ipratropium for Nebulization 3 milliLiter(s) Nebulizer every 6 hours PRN Shortness of Breath and/or Wheezing

## 2019-08-09 NOTE — H&P ADULT - NSHPPHYSICALEXAM_GEN_ALL_CORE
Vital Signs Last 24 Hrs  T(C): 36.4 (09 Aug 2019 03:10), Max: 36.4 (09 Aug 2019 03:10)  T(F): 97.5 (09 Aug 2019 03:10), Max: 97.5 (09 Aug 2019 03:10)  HR: 70 (09 Aug 2019 03:30) (58 - 70)  BP: 99/49 (09 Aug 2019 03:30) (94/51 - 110/57)  BP(mean): --  RR: 20 (09 Aug 2019 03:30) (18 - 20)  SpO2: 97% (09 Aug 2019 03:30) (96% - 97%)    GEN: NAD  HEENT: NCAT  CV: RRR  RESP: CTAB  ABD: NTND, soft  EXT: no C/C/E  NEURO: no focal deficits

## 2019-08-09 NOTE — ED PROVIDER NOTE - ATTENDING CONTRIBUTION TO CARE
79 F to ED with 3 episodes of BRBPR and 1 episode of vomiting.   No fevers, no trauma, no back pain, no injury or fall. She had a similar episode years ago that needed a transfusion. No f/u with a GI and no outpt scope done.  pt has a h/o COPD and uses nebs at home.  AVSS, exam as noted, CTAB, RRR, abdomen soft NTND, (+) bowel sounds, neuro nonfocal

## 2019-08-09 NOTE — PATIENT PROFILE ADULT - FALL HARM RISK
MTM referral from: Patient's insurance ([employer] products)    MTM referral outreach attempt #2 on April 30, 2018 at 3:54 PM      Outcome: Left Message    Whitney Mujica, Pharmacy Intern    
Admitted with dx GIB/coagulation(Bleeding disorder R/T clinical cond/anti-coags)/other

## 2019-08-09 NOTE — ED ADULT NURSE NOTE - OBJECTIVE STATEMENT
Pt with complaints blood in stools and nausea/vomiting since this morning. Denies abdominal pain at this time.

## 2019-08-09 NOTE — H&P ADULT - ATTENDING COMMENTS
Pt was seen and examined at bedside independently, pt was actively bleeding on medical floor, received 4 units of PRBC total since admission, repeat Hb is 8.3,  CT angio: Intraluminal contrast extravasation at the ascending colon, most consistent with active gastrointestinal hemorrhage. IR consulted, who stated that intervention not urgent at the moment. Pt was upgraded to ICU this morning for close observation.   I agree with medical residents findings, assessment and plan with a few corrections in my note.     Vital Signs Last 24 Hrs  T(C): 37.1 (10 Aug 2019 12:00), Max: 37.1 (10 Aug 2019 12:00)  T(F): 98.8 (10 Aug 2019 12:00), Max: 98.8 (10 Aug 2019 12:00)  HR: 88 (10 Aug 2019 15:00) (70 - 96)  BP: 112/63 (10 Aug 2019 15:00) (108/58 - 158/72)  BP(mean): 77 (10 Aug 2019 15:00) (77 - 105)  RR: 27 (10 Aug 2019 15:00) (18 - 29)  SpO2: 99% (10 Aug 2019 15:00) (97% - 100%)    PHYSICAL EXAM:  GEN: NAD, pleasant elderly female   HEENT: NCAT  CV: RRR, no murmurs   RESP: CTA b/l   ABD: NT; ND, soft, BS present   EXT: no C/C/E, SCD on LE B/l   NEURO: no focal deficits, AAOx3     LABS:                        8.3    7.26  )-----------( 110      ( 10 Aug 2019 13:13 )             25.1   08-09    142  |  106  |  15  ----------------------------<  85  3.4<L>   |  24  |  0.8    Ca    8.3<L>      09 Aug 2019 11:13    TPro  6.0  /  Alb  3.2<L>  /  TBili  0.3  /  DBili  x   /  AST  15  /  ALT  12  /  AlkPhos  53  08-09  RADIOLOGY:  < from: Xray Chest 1 View- PORTABLE-Urgent (08.10.19 @ 10:47) >    Impression:      No radiographic evidence of acute cardiopulmonary disease.  < from: CT Angio Abdomen and Pelvis w/ IV Cont (08.10.19 @ 03:34) >    IMPRESSION:    Intraluminal contrast extravasation at the ascending colon, most   consistent with active gastrointestinal hemorrhage.    A/P  # Lower GI bleed/ Acute blood loss anemia  - pt was upgraded to ICU this morning  - CBC Q 6 hours, NPO, Protonix drip  - consulted by IR ( CTA showed contrast extravasation at the ascending colon, most   consistent with active gastrointestinal hemorrhage.) ; f/u CBC at 4 pm if Hb is dropping pt might need IR embolization   - consulted by GI , recommendations noted ( possible colonoscopy on Monday)   - hold ASA  - pt received 4 units of PRBC since admission, keep Hb above 7.5 , keep type and cross active      # h/o COPD  -stable, Nebs PRN  - supplement oxygen, monitor pulse Ox     # HTN  - c/w home medications     # h/o MR  - monitor for volume overload ( pt received 4 units of PRBC )     # Hypothyroid  - c/w Synthroid     # DVT prophylaxis   - SCD b/l LE     #Progress Note Handoff  Pending (specify):  f/u CBC at 4 pm , contact IR if Hb is dropping, possible colonoscopy on Monday   Family discussion: I spoke with a granddaughter at the bedside   Disposition: upgraded to ICU, please change the plate to ICU attending

## 2019-08-09 NOTE — ED PROVIDER NOTE - CLINICAL SUMMARY MEDICAL DECISION MAKING FREE TEXT BOX
+ blood in rectal vault, bp dropped from 109 to 94 to 85 systolic in ED so plan for transfusion of 2U prbc

## 2019-08-09 NOTE — H&P ADULT - NSHPLABSRESULTS_GEN_ALL_CORE
LABS                        6.4<LL>  7.37  )-----------( 145      ( 08-09 @ 00:30 )             20.9<L>    08-09    137  |  103  |  21<H>  ----------------------------<  137<H>  4.5   |  25  |  0.9    Ca    8.4<L>      09 Aug 2019 00:30    TPro  6.3  /  Alb  3.1<L>  /  TBili  <0.2  /  DBili  x   /  AST  40  /  ALT  15  /  AlkPhos  46  08-09    PT/INR - ( 09 Aug 2019 00:30 )   PT: 12.70 sec;   INR: 1.11 ratio      PTT - ( 09 Aug 2019 00:30 )  PTT:28.2 sec    CARDIAC MARKERS ( 09 Aug 2019 01:40 )  x     / <0.01 ng/mL / x     / x     / x

## 2019-08-09 NOTE — ED ADULT NURSE NOTE - NSIMPLEMENTINTERV_GEN_ALL_ED
Implemented All Fall with Harm Risk Interventions:  Catherine to call system. Call bell, personal items and telephone within reach. Instruct patient to call for assistance. Room bathroom lighting operational. Non-slip footwear when patient is off stretcher. Physically safe environment: no spills, clutter or unnecessary equipment. Stretcher in lowest position, wheels locked, appropriate side rails in place. Provide visual cue, wrist band, yellow gown, etc. Monitor gait and stability. Monitor for mental status changes and reorient to person, place, and time. Review medications for side effects contributing to fall risk. Reinforce activity limits and safety measures with patient and family. Provide visual clues: red socks.

## 2019-08-09 NOTE — ED ADULT NURSE NOTE - CHIEF COMPLAINT QUOTE
Patient BIBA from home for bloody stools that started this morning. Patient also reports lower abdominal pain and vomiting. BP on scene was 105/57. Patient is on Aspirin.

## 2019-08-09 NOTE — H&P ADULT - ASSESSMENT
ASSESSMENTS  GIB  Severe MR    PLANS  CNS: no sedation    HEENT: oral care    CARDIO: hold anti-HTN meds    PULM: Symbicort and Duoneb prn    GI: NPO, f/u GI recs, Protonix drip    RENAL: monitor lytes    HEME: transfuse if hgb <7, keep active type and screen, SCDs, hold antiplatelet    ENDO: monitor blood glucose, c/w levothyroxine    MSK: bedrest ASSESSMENTS  GIB  Severe MR    PLANS  CNS: no sedation    HEENT: oral care    CARDIO: hold anti-HTN meds    PULM: Symbicort and Duoneb prn    GI: NPO, f/u GI recs, Protonix drip    RENAL: monitor lytes    HEME: serial CBC, transfuse if hgb <7, keep active type and screen, SCDs, hold antiplatelet    ENDO: monitor blood glucose, c/w levothyroxine    MSK: bedrest

## 2019-08-09 NOTE — H&P ADULT - NSICDXPASTMEDICALHX_GEN_ALL_CORE_FT
PAST MEDICAL HISTORY:  CHF (congestive heart failure)     COPD (chronic obstructive pulmonary disease)     HTN (hypertension)     Other specified hypothyroidism     Severe mitral regurgitation

## 2019-08-09 NOTE — ED PROVIDER NOTE - OBJECTIVE STATEMENT
Patient has a history of HTN, anemia, CHF, previous Lower GI bleeding requiring Patient has a history of HTN, anemia, CHF, previous Lower GI bleeding requiring multiple transfusions, presenting with BRBPR and fatigue. Per daughter, she was having a BM at 11 AM when she noticed some red blood in her stool. Later in the day, she began to have srikanth blood "pouring out of her bottom,"soaking through her clothes and onto the Patient has a history of HTN, anemia, CHF, previous Lower GI bleeding requiring multiple transfusions, presenting with BRBPR and fatigue. Per daughter, she was having a BM at 11 AM when she noticed some red blood in her stool. Later in the day, she began to have srikanth blood "pouring out of her bottom,"soaking through her clothes and onto the floor. She denied any LOC, CP, SOB, Vomiting/F/C. She was brought in by EMS with a BP of 84/50's. She is not on any anticoagulation currently.

## 2019-08-09 NOTE — ED ADULT NURSE REASSESSMENT NOTE - NS ED NURSE REASSESS COMMENT FT1
No further vomiting noted. Pt for blood transfusion of 2 units packed RBC with consent obtained by MD and signed by daughter. Awaiting for blood. Will follow-up.
Pt with episode of vomiting, MD aware with Zofran IV given as ordered.

## 2019-08-09 NOTE — ED PROVIDER NOTE - PROGRESS NOTE DETAILS
s/o Dr. Sheffield, f/u cbc, GI fellow notified about patient, will watch and likely come to see patient in the morning for possible colonoscopy. ICU fellow paged. ICU fellow informed, patient to be admitted to MICU. ICU fellow Marcos informed, patient to be admitted to MICU. ICU resident informed

## 2019-08-09 NOTE — H&P ADULT - HISTORY OF PRESENT ILLNESS
78 yo F w/ hx of GIB, PUD, diverticulosis, hemorrhoids, HTN, COPD, severe MR, HFpEF, hypothyroidism p/w GIB. Pt had 1 episode of "burgundy" colored liquid in toilet bowel with formed stools, then another episode at night time. Pt admits to taking 4 baby aspirin the day before for back pain. Pt admits to lightheadedness and EMS found BP of 80/50 by EMS. Pt had hx of GIB Aug 2018, EGD significant for gastritis, duodenitis, and PUD, colonoscopy showed diverticulosis, polyps, and internal/external hemorrhoids. She was to follow up with repeat colonoscopy in 3 months but never did.    In ED, hgb down to 6.4 from baseline of 10 in June 2019, rectal exam showed streak of red blood, placed on Protonix drip and given 2u PRBC

## 2019-08-09 NOTE — CONSULT NOTE ADULT - ASSESSMENT
78 yo F presents with maroon colored stool, Hx of LGIB likely diverticular in the etiology 1 year ago.     1)GIB- likely LGIB not active at the moment    Rec:  - s/p 2 unit PRBC with adequate response Hgb from 6.2 to 8.8.  - Start clears  - CBC BID if Hgb stable without evidence of bleeding advance diet in AM  - If bleeding, HD instability, drop in Hgb obtain CTA otherwise prep for colonoscopy on Monday as the prep 1 year ago was suboptimal. 80 yo F presents with maroon colored stool, Hx of LGIB likely diverticular in the etiology 1 year ago.     1)GIB- likely LGIB not active at the moment    Rec:  - s/p 2 unit PRBC with adequate response Hgb from 6.2 to 8.8.  - Start clears, Advance tomorrow if no evidence of bleeding  - CBC BID if Hgb stable without evidence of bleeding advance diet in AM  - If bleeding, HD instability, drop in Hgb obtain CTA otherwise prep for colonoscopy on Monday as the prep 1 year ago was suboptimal.

## 2019-08-09 NOTE — ED PROVIDER NOTE - NS ED ROS FT
Constitutional: (-) fever, (+) fatigue  Eyes/ENT: (-) blurry vision, (-) epistaxis  Cardiovascular: (-) chest pain, (-) syncope  Respiratory: (-) cough, (-) shortness of breath  Gastrointestinal: (-) vomiting, (-) diarrhea  Musculoskeletal: (-) neck pain, (-) back pain, (-) joint pain  Integumentary: (-) rash, (-) edema  Neurological: (-) headache, (-) altered mental status, (+) Light-headedness.   Psychiatric: (-) hallucinations  Allergic/Immunologic: (-) pruritus

## 2019-08-09 NOTE — CONSULT NOTE ADULT - SUBJECTIVE AND OBJECTIVE BOX
Gastroenterology/Hepatology Consultation    For questions and inquiries please page [phone].  For urgent matters or after 5pm and on weekends please page the fellow on call through the GI paging system.    79y Female with   Patient is a 79y old  Female who presents with a chief complaint of LGIB (09 Aug 2019 07:19)    HPI:  80 yo F w/ hx of GIB, PUD, diverticulosis, hemorrhoids, HTN, COPD, severe MR, HFpEF, hypothyroidism p/w GIB. Pt had 1 episode of "burgundy" colored liquid in toilet bowel with formed stools, then another episode at night time. Pt admits to taking 4 baby aspirin the day before for back pain. Pt admits to lightheadedness and EMS found BP of 80/50 by EMS. Pt had hx of GIB Aug 2018, EGD significant for gastritis, duodenitis, and PUD, colonoscopy showed diverticulosis, polyps, and internal/external hemorrhoids. She was to follow up with repeat colonoscopy in 3 months but never did.    In ED, hgb down to 6.4 from baseline of 10 in June 2019, rectal exam showed streak of red blood, placed on Protonix drip and given 2u PRBC (09 Aug 2019 04:41)      GI Hx:    Previous colonoscopy(ies):  Previous EGD(s):    No pertinent family history in first degree relatives      Review of system  General:  (-) weight loss, (-) fevers  Eyes:  (-) visual changes  CV:  (-) chest pain  Resp: (-) SOB, (-) wheezing  GI: (-) abdominal pain,  (-) nausea, (-) vomiting, (-) dysphagia, (-) diarrhea, (-) constipation, (-) rectal bleeding, (-) melena, (-) hematemesis.  Neuro: (-) confusion, (-) weakness  Psych:  (-) Hallucinations  Heme:  (-) easy bruisability    Past medical/surgical Hx:  PAST MEDICAL & SURGICAL HISTORY:  Other specified hypothyroidism  Severe mitral regurgitation  COPD (chronic obstructive pulmonary disease)  CHF (congestive heart failure)  HTN (hypertension)  History of cholecystectomy    Home Medications:  albuterol 2.5 mg/3 mL (0.083%) inhalation solution: 3 milliliter(s) inhaled every 2 hours, As Needed  AMLODIPINE   TAB 5MG:   aspirin 81 mg oral tablet, chewable: 1 tab(s) orally once a day  ATORVASTATIN TAB 20MG:   Breo Ellipta 100 mcg-25 mcg/inh inhalation powder: 1 puff(s) inhaled once a day  ENALAPRIL    TAB 10MG:   furosemide 40 mg oral tablet: 1 tab(s) orally once a day  LEVOTHYROXIN TAB 25MCG:   metoprolol tartrate 25 mg oral tablet: 1 tab(s) orally 2 times a day      Allergies: No Known Allergies      Current Medications:   ALBUTerol/ipratropium for Nebulization 3 milliLiter(s) Nebulizer every 6 hours PRN  atorvastatin 20 milliGRAM(s) Oral at bedtime  buDESOnide  80 MICROgram(s)/formoterol 4.5 MICROgram(s) Inhaler 2 Puff(s) Inhalation two times a day  chlorhexidine 4% Liquid 1 Application(s) Topical <User Schedule>  levothyroxine 25 MICROGram(s) Oral daily  pantoprazole Infusion 8 mG/Hr IV Continuous <Continuous>      Physical exam:  T(C): 36.9 (08-09-19 @ 15:16), Max: 36.9 (08-09-19 @ 04:51)  HR: 74 (08-09-19 @ 17:00) (58 - 74)  BP: 125/87 (08-09-19 @ 17:00) (94/51 - 159/69)  RR: 18 (08-09-19 @ 17:00) (17 - 24)  SpO2: 97% (08-09-19 @ 17:00) (96% - 98%)  GENERAL: NAD  HEAD:  Atraumatic, Normocephalic  EYES: Sclera:NL  NECK: Supple, no JVD or thyromegaly  CHEST/LUNG: Good bilateral air entry  HEART: normal S1, S2. Regular  ABDOMEN: (-) distended, (-) tender, (-) rebound, (+) BS, (-)HSM  EXTREMITIES: (-) edema  NEUROLOGY: (-) asterixis  SKIN: (-) jaundice  CACHORRO: (-) melena (-) brbpr    Data:                        8.8    6.26  )-----------( 128      ( 09 Aug 2019 16:26 )             27.3     MCV 92.5 (08-09-19)  91.8 (08-09-19)  95.9 (08-09-19)    RDW 16.0 (08-09-19)  15.8 (08-09-19)  15.0 (08-09-19)    HGB trend:  8.8  08-09-19 @ 16:26  8.7  08-09-19 @ 11:13  6.4  08-09-19 @ 00:30        08-09    142  |  106  |  15  ----------------------------<  85  3.4<L>   |  24  |  0.8    Ca    8.3<L>      09 Aug 2019 11:13    TPro  6.0  /  Alb  3.2<L>  /  TBili  0.3  /  DBili  x   /  AST  15  /  ALT  12  /  AlkPhos  53  08-09    Liver panel trend:  TBili 0.3   /   AST 15   /   ALT 12   /   AlkP 53   /   Tptn 6.0   /   Alb 3.2    /   DBili --      08-09  TBili <0.2   /   AST 40   /   ALT 15   /   AlkP 46   /   Tptn 6.3   /   Alb 3.1    /   DBili --      08-09        PT/INR - ( 09 Aug 2019 00:30 )   PT: 12.70 sec;   INR: 1.11 ratio         PTT - ( 09 Aug 2019 00:30 )  PTT:28.2 sec Gastroenterology/Hepatology Consultation    For questions and inquiries please page [phone].  For urgent matters or after 5pm and on weekends please page the fellow on call through the GI paging system.    79y Female with   Patient is a 79y old  Female who presents with a chief complaint of LGIB (09 Aug 2019 07:19)    HPI:  80 yo F w/ hx of GIB, PUD, diverticulosis, hemorrhoids, HTN, COPD, severe MR, HFpEF, hypothyroidism p/w GIB. Pt had 1 episode of "burgundy" colored liquid in toilet bowel with formed stools, then another episode at night time. Pt admits to taking 4 baby aspirin the day before for back pain. Pt admits to lightheadedness and EMS found BP of 80/50 by EMS. Pt had hx of GIB Aug 2018, EGD significant for gastritis, duodenitis, and PUD, colonoscopy showed insufficient prep, diverticulosis, polyps, and internal/external hemorrhoids. She was to follow up with repeat colonoscopy in 3 months but never did.    In ED, hgb down to 6.4 from baseline of 10 in June 2019, rectal exam showed streak of red blood, placed on Protonix drip and given 2u PRBC (09 Aug 2019 04:41)         No pertinent family history in first degree relatives      Review of system  General:  (-) weight loss, (-) fevers  Eyes:  (-) visual changes  CV:  (-) chest pain  Resp: (-) SOB, (-) wheezing  GI: (-) abdominal pain,  (-) nausea, (-) vomiting, (-) dysphagia, (-) diarrhea, (-) constipation, (-) rectal bleeding, (-) melena, (-) hematemesis.  Neuro: (-) confusion, (-) weakness  Psych:  (-) Hallucinations  Heme:  (-) easy bruisability    Past medical/surgical Hx:  PAST MEDICAL & SURGICAL HISTORY:  Other specified hypothyroidism  Severe mitral regurgitation  COPD (chronic obstructive pulmonary disease)  CHF (congestive heart failure)  HTN (hypertension)  History of cholecystectomy    Home Medications:  albuterol 2.5 mg/3 mL (0.083%) inhalation solution: 3 milliliter(s) inhaled every 2 hours, As Needed  AMLODIPINE   TAB 5MG:   aspirin 81 mg oral tablet, chewable: 1 tab(s) orally once a day  ATORVASTATIN TAB 20MG:   Breo Ellipta 100 mcg-25 mcg/inh inhalation powder: 1 puff(s) inhaled once a day  ENALAPRIL    TAB 10MG:   furosemide 40 mg oral tablet: 1 tab(s) orally once a day  LEVOTHYROXIN TAB 25MCG:   metoprolol tartrate 25 mg oral tablet: 1 tab(s) orally 2 times a day      Allergies: No Known Allergies      Current Medications:   ALBUTerol/ipratropium for Nebulization 3 milliLiter(s) Nebulizer every 6 hours PRN  atorvastatin 20 milliGRAM(s) Oral at bedtime  buDESOnide  80 MICROgram(s)/formoterol 4.5 MICROgram(s) Inhaler 2 Puff(s) Inhalation two times a day  chlorhexidine 4% Liquid 1 Application(s) Topical <User Schedule>  levothyroxine 25 MICROGram(s) Oral daily  pantoprazole Infusion 8 mG/Hr IV Continuous <Continuous>      Physical exam:  T(C): 36.9 (08-09-19 @ 15:16), Max: 36.9 (08-09-19 @ 04:51)  HR: 74 (08-09-19 @ 17:00) (58 - 74)  BP: 125/87 (08-09-19 @ 17:00) (94/51 - 159/69)  RR: 18 (08-09-19 @ 17:00) (17 - 24)  SpO2: 97% (08-09-19 @ 17:00) (96% - 98%)  GENERAL: NAD  HEAD:  Atraumatic, Normocephalic  EYES: Sclera:NL  NECK: Supple, no JVD or thyromegaly  CHEST/LUNG: Good bilateral air entry  HEART: normal S1, S2. Regular  ABDOMEN: (-) distended, (-) tender, (-) rebound, (+) BS, (-)HSM  EXTREMITIES: (-) edema  NEUROLOGY: (-) asterixis  SKIN: (-) jaundice  CACHORRO: (-) melena (-) brbpr    Data:                        8.8    6.26  )-----------( 128      ( 09 Aug 2019 16:26 )             27.3     MCV 92.5 (08-09-19)  91.8 (08-09-19)  95.9 (08-09-19)    RDW 16.0 (08-09-19)  15.8 (08-09-19)  15.0 (08-09-19)    HGB trend:  8.8  08-09-19 @ 16:26  8.7  08-09-19 @ 11:13  6.4  08-09-19 @ 00:30        08-09    142  |  106  |  15  ----------------------------<  85  3.4<L>   |  24  |  0.8    Ca    8.3<L>      09 Aug 2019 11:13    TPro  6.0  /  Alb  3.2<L>  /  TBili  0.3  /  DBili  x   /  AST  15  /  ALT  12  /  AlkPhos  53  08-09    Liver panel trend:  TBili 0.3   /   AST 15   /   ALT 12   /   AlkP 53   /   Tptn 6.0   /   Alb 3.2    /   DBili --      08-09  TBili <0.2   /   AST 40   /   ALT 15   /   AlkP 46   /   Tptn 6.3   /   Alb 3.1    /   DBili --      08-09        PT/INR - ( 09 Aug 2019 00:30 )   PT: 12.70 sec;   INR: 1.11 ratio         PTT - ( 09 Aug 2019 00:30 )  PTT:28.2 sec

## 2019-08-10 LAB
ANION GAP SERPL CALC-SCNC: 8 MMOL/L — SIGNIFICANT CHANGE UP (ref 7–14)
BASOPHILS # BLD AUTO: 0.02 K/UL — SIGNIFICANT CHANGE UP (ref 0–0.2)
BASOPHILS NFR BLD AUTO: 0.3 % — SIGNIFICANT CHANGE UP (ref 0–1)
BUN SERPL-MCNC: 11 MG/DL — SIGNIFICANT CHANGE UP (ref 10–20)
CALCIUM SERPL-MCNC: 7.9 MG/DL — LOW (ref 8.5–10.1)
CHLORIDE SERPL-SCNC: 110 MMOL/L — SIGNIFICANT CHANGE UP (ref 98–110)
CO2 SERPL-SCNC: 24 MMOL/L — SIGNIFICANT CHANGE UP (ref 17–32)
CREAT SERPL-MCNC: 0.8 MG/DL — SIGNIFICANT CHANGE UP (ref 0.7–1.5)
EOSINOPHIL # BLD AUTO: 0.05 K/UL — SIGNIFICANT CHANGE UP (ref 0–0.7)
EOSINOPHIL NFR BLD AUTO: 0.7 % — SIGNIFICANT CHANGE UP (ref 0–8)
GLUCOSE BLDC GLUCOMTR-MCNC: 94 MG/DL — SIGNIFICANT CHANGE UP (ref 70–99)
GLUCOSE SERPL-MCNC: 98 MG/DL — SIGNIFICANT CHANGE UP (ref 70–99)
HCT VFR BLD CALC: 20.7 % — LOW (ref 37–47)
HCT VFR BLD CALC: 22.7 % — LOW (ref 37–47)
HCT VFR BLD CALC: 25.1 % — LOW (ref 37–47)
HCT VFR BLD CALC: 25.2 % — LOW (ref 37–47)
HGB BLD-MCNC: 6.7 G/DL — CRITICAL LOW (ref 12–16)
HGB BLD-MCNC: 7.3 G/DL — LOW (ref 12–16)
HGB BLD-MCNC: 8.2 G/DL — LOW (ref 12–16)
HGB BLD-MCNC: 8.3 G/DL — LOW (ref 12–16)
IMM GRANULOCYTES NFR BLD AUTO: 0.6 % — HIGH (ref 0.1–0.3)
LYMPHOCYTES # BLD AUTO: 1.03 K/UL — LOW (ref 1.2–3.4)
LYMPHOCYTES # BLD AUTO: 14.3 % — LOW (ref 20.5–51.1)
MCHC RBC-ENTMCNC: 28.9 PG — SIGNIFICANT CHANGE UP (ref 27–31)
MCHC RBC-ENTMCNC: 29.7 PG — SIGNIFICANT CHANGE UP (ref 27–31)
MCHC RBC-ENTMCNC: 30 PG — SIGNIFICANT CHANGE UP (ref 27–31)
MCHC RBC-ENTMCNC: 30.3 PG — SIGNIFICANT CHANGE UP (ref 27–31)
MCHC RBC-ENTMCNC: 32.2 G/DL — SIGNIFICANT CHANGE UP (ref 32–37)
MCHC RBC-ENTMCNC: 32.4 G/DL — SIGNIFICANT CHANGE UP (ref 32–37)
MCHC RBC-ENTMCNC: 32.5 G/DL — SIGNIFICANT CHANGE UP (ref 32–37)
MCHC RBC-ENTMCNC: 33.1 G/DL — SIGNIFICANT CHANGE UP (ref 32–37)
MCV RBC AUTO: 89.7 FL — SIGNIFICANT CHANGE UP (ref 81–99)
MCV RBC AUTO: 90.6 FL — SIGNIFICANT CHANGE UP (ref 81–99)
MCV RBC AUTO: 91.3 FL — SIGNIFICANT CHANGE UP (ref 81–99)
MCV RBC AUTO: 93.7 FL — SIGNIFICANT CHANGE UP (ref 81–99)
MONOCYTES # BLD AUTO: 0.56 K/UL — SIGNIFICANT CHANGE UP (ref 0.1–0.6)
MONOCYTES NFR BLD AUTO: 7.8 % — SIGNIFICANT CHANGE UP (ref 1.7–9.3)
NEUTROPHILS # BLD AUTO: 5.52 K/UL — SIGNIFICANT CHANGE UP (ref 1.4–6.5)
NEUTROPHILS NFR BLD AUTO: 76.3 % — HIGH (ref 42.2–75.2)
NRBC # BLD: 0 /100 WBCS — SIGNIFICANT CHANGE UP (ref 0–0)
PLATELET # BLD AUTO: 110 K/UL — LOW (ref 130–400)
PLATELET # BLD AUTO: 117 K/UL — LOW (ref 130–400)
PLATELET # BLD AUTO: 94 K/UL — LOW (ref 130–400)
PLATELET # BLD AUTO: 98 K/UL — LOW (ref 130–400)
POTASSIUM SERPL-MCNC: 3.8 MMOL/L — SIGNIFICANT CHANGE UP (ref 3.5–5)
POTASSIUM SERPL-SCNC: 3.8 MMOL/L — SIGNIFICANT CHANGE UP (ref 3.5–5)
RBC # BLD: 2.21 M/UL — LOW (ref 4.2–5.4)
RBC # BLD: 2.53 M/UL — LOW (ref 4.2–5.4)
RBC # BLD: 2.76 M/UL — LOW (ref 4.2–5.4)
RBC # BLD: 2.77 M/UL — LOW (ref 4.2–5.4)
RBC # FLD: 15.6 % — HIGH (ref 11.5–14.5)
RBC # FLD: 15.7 % — HIGH (ref 11.5–14.5)
RBC # FLD: 15.9 % — HIGH (ref 11.5–14.5)
RBC # FLD: 16.4 % — HIGH (ref 11.5–14.5)
SODIUM SERPL-SCNC: 142 MMOL/L — SIGNIFICANT CHANGE UP (ref 135–146)
WBC # BLD: 6.69 K/UL — SIGNIFICANT CHANGE UP (ref 4.8–10.8)
WBC # BLD: 7.22 K/UL — SIGNIFICANT CHANGE UP (ref 4.8–10.8)
WBC # BLD: 7.26 K/UL — SIGNIFICANT CHANGE UP (ref 4.8–10.8)
WBC # BLD: 7.33 K/UL — SIGNIFICANT CHANGE UP (ref 4.8–10.8)
WBC # FLD AUTO: 6.69 K/UL — SIGNIFICANT CHANGE UP (ref 4.8–10.8)
WBC # FLD AUTO: 7.22 K/UL — SIGNIFICANT CHANGE UP (ref 4.8–10.8)
WBC # FLD AUTO: 7.26 K/UL — SIGNIFICANT CHANGE UP (ref 4.8–10.8)
WBC # FLD AUTO: 7.33 K/UL — SIGNIFICANT CHANGE UP (ref 4.8–10.8)

## 2019-08-10 PROCEDURE — 99223 1ST HOSP IP/OBS HIGH 75: CPT | Mod: AI

## 2019-08-10 PROCEDURE — 93306 TTE W/DOPPLER COMPLETE: CPT | Mod: 26

## 2019-08-10 PROCEDURE — 74174 CTA ABD&PLVS W/CONTRAST: CPT | Mod: 26

## 2019-08-10 PROCEDURE — 71045 X-RAY EXAM CHEST 1 VIEW: CPT | Mod: 26

## 2019-08-10 PROCEDURE — 99233 SBSQ HOSP IP/OBS HIGH 50: CPT

## 2019-08-10 RX ORDER — POTASSIUM CHLORIDE 20 MEQ
10 PACKET (EA) ORAL ONCE
Refills: 0 | Status: DISCONTINUED | OUTPATIENT
Start: 2019-08-10 | End: 2019-08-10

## 2019-08-10 RX ORDER — PANTOPRAZOLE SODIUM 20 MG/1
40 TABLET, DELAYED RELEASE ORAL DAILY
Refills: 0 | Status: DISCONTINUED | OUTPATIENT
Start: 2019-08-10 | End: 2019-08-15

## 2019-08-10 RX ORDER — SODIUM CHLORIDE 9 MG/ML
1000 INJECTION, SOLUTION INTRAVENOUS
Refills: 0 | Status: DISCONTINUED | OUTPATIENT
Start: 2019-08-10 | End: 2019-08-12

## 2019-08-10 RX ORDER — POTASSIUM CHLORIDE 20 MEQ
20 PACKET (EA) ORAL
Refills: 0 | Status: COMPLETED | OUTPATIENT
Start: 2019-08-10 | End: 2019-08-10

## 2019-08-10 RX ORDER — POTASSIUM CHLORIDE 20 MEQ
20 PACKET (EA) ORAL
Refills: 0 | Status: DISCONTINUED | OUTPATIENT
Start: 2019-08-10 | End: 2019-08-10

## 2019-08-10 RX ADMIN — BUDESONIDE AND FORMOTEROL FUMARATE DIHYDRATE 2 PUFF(S): 160; 4.5 AEROSOL RESPIRATORY (INHALATION) at 21:34

## 2019-08-10 RX ADMIN — Medication 50 MILLIEQUIVALENT(S): at 11:53

## 2019-08-10 RX ADMIN — SODIUM CHLORIDE 50 MILLILITER(S): 9 INJECTION, SOLUTION INTRAVENOUS at 09:30

## 2019-08-10 RX ADMIN — Medication 3 MILLILITER(S): at 09:24

## 2019-08-10 RX ADMIN — PANTOPRAZOLE SODIUM 10 MG/HR: 20 TABLET, DELAYED RELEASE ORAL at 06:43

## 2019-08-10 RX ADMIN — Medication 50 MILLIEQUIVALENT(S): at 09:15

## 2019-08-10 RX ADMIN — BUDESONIDE AND FORMOTEROL FUMARATE DIHYDRATE 2 PUFF(S): 160; 4.5 AEROSOL RESPIRATORY (INHALATION) at 11:55

## 2019-08-10 RX ADMIN — Medication 3 MILLILITER(S): at 20:21

## 2019-08-10 RX ADMIN — SODIUM CHLORIDE 75 MILLILITER(S): 9 INJECTION, SOLUTION INTRAVENOUS at 01:59

## 2019-08-10 RX ADMIN — ATORVASTATIN CALCIUM 20 MILLIGRAM(S): 80 TABLET, FILM COATED ORAL at 21:35

## 2019-08-10 NOTE — CHART NOTE - NSCHARTNOTEFT_GEN_A_CORE
Transfer Note    Transfer from: 3B  Transfer to:  (  ) Medicine    (  ) Telemetry    (  ) RCU    (  ) Palliative    (  ) Stroke Unit    (  ) _______________  Accepting physican:      Hospital COURSE:          ASSESSMENT & PLAN:         For Follow-Up:          Vital Signs Last 24 Hrs  T(C): 35.6 (10 Aug 2019 03:15), Max: 36.9 (09 Aug 2019 07:54)  T(F): 96 (10 Aug 2019 03:15), Max: 98.4 (09 Aug 2019 07:54)  HR: 96 (10 Aug 2019 03:15) (60 - 96)  BP: 129/61 (10 Aug 2019 03:15) (109/58 - 159/69)  BP(mean): 82 (09 Aug 2019 19:00) (82 - 105)  RR: 18 (10 Aug 2019 00:02) (17 - 24)  SpO2: 100% (10 Aug 2019 01:35) (96% - 100%)  I&O's Summary    08 Aug 2019 07:01  -  09 Aug 2019 07:00  --------------------------------------------------------  IN: 10 mL / OUT: 0 mL / NET: 10 mL    09 Aug 2019 07:01  -  10 Aug 2019 06:37  --------------------------------------------------------  IN: 420 mL / OUT: 400 mL / NET: 20 mL          MEDICATIONS  (STANDING):  atorvastatin 20 milliGRAM(s) Oral at bedtime  buDESOnide  80 MICROgram(s)/formoterol 4.5 MICROgram(s) Inhaler 2 Puff(s) Inhalation two times a day  chlorhexidine 4% Liquid 1 Application(s) Topical <User Schedule>  lactated ringers. 1000 milliLiter(s) (75 mL/Hr) IV Continuous <Continuous>  levothyroxine 25 MICROGram(s) Oral daily  pantoprazole Infusion 8 mG/Hr (10 mL/Hr) IV Continuous <Continuous>  potassium chloride  20 mEq/100 mL IVPB 20 milliEquivalent(s) IV Intermittent every 2 hours    MEDICATIONS  (PRN):  ALBUTerol/ipratropium for Nebulization 3 milliLiter(s) Nebulizer every 6 hours PRN Shortness of Breath and/or Wheezing        LABS                                            6.7                   Neurophils% (auto):   76.3   (08-10 @ 00:57):    7.22 )-----------(117          Lymphocytes% (auto):  14.3                                          20.7                   Eosinphils% (auto):   0.7      Manual%: Neutrophils x    ; Lymphocytes x    ; Eosinophils x    ; Bands%: x    ; Blasts x                                    142    |  106    |  15                  Calcium: 8.3   / iCa: x      (08-09 @ 11:13)    ----------------------------<  85        Magnesium: x                                3.4     |  24     |  0.8              Phosphorous: x        TPro  6.0    /  Alb  3.2    /  TBili  0.3    /  DBili  x      /  AST  15     /  ALT  12     /  AlkPhos  53     09 Aug 2019 11:13 Transfer Note    Transfer from: 3B  Transfer to:  (  ) Medicine    (  ) Telemetry    (  ) RCU    (  ) Palliative    (  ) Stroke Unit    (  ) _______________  Accepting physican:      Hospital COURSE:  80 yo F w/ hx of GIB, PUD, diverticulosis, hemorrhoids, HTN, COPD, severe MR, HFpEF, hypothyroidism p/w GIB. Pt had 1 episode of "burgundy" colored liquid in toilet bowel with formed stools, then another episode at night time. Pt admits to taking 4 baby aspirin the day before for back pain. Pt admits to lightheadedness and EMS found BP of 80/50 by EMS. Pt had hx of GIB Aug 2018, EGD significant for gastritis, duodenitis, and PUD, colonoscopy showed diverticulosis, polyps, and internal/external hemorrhoids. She was to follow up with repeat colonoscopy in 3 months but never did.    In ED, hgb down to 6.4 from baseline of 10 in June 2019, rectal exam showed streak of red blood, placed on Protonix drip and given 2u PRBC (09 Aug 2019 04:41).  Patient had episode of LGIB this evening, Hb found to be 6.7 from 8.8, 2 units PRBC transfused. CT angio showed bleeding in right colon. Patient is currently normotensive, no tachycardia, feels lightheaded and weak.         ASSESSMENT & PLAN:   79 Y F with pmh of GIB, PUD, diverticulosis, hemorrhoids, HTN, COPD, severe MR, HFpEF, hypothyroidism p/w GIB. CT showed bleeding in right colon, s/p 2 unit PRBC.     1) LGIB  - Upgrade to   - s/p 2 unit PRBC this evening, pending repeat CBC  - CT angio: Intraluminal contrast extravasation at the ascending colon, most consistent with active gastrointestinal hemorrhage.  - IR consulted, who stated that intervention not urgent at the moment and will follow up later this morning  - Currently NPO  - GI following.      For Follow-Up:  CBC        Vital Signs Last 24 Hrs  T(C): 35.6 (10 Aug 2019 03:15), Max: 36.9 (09 Aug 2019 07:54)  T(F): 96 (10 Aug 2019 03:15), Max: 98.4 (09 Aug 2019 07:54)  HR: 96 (10 Aug 2019 03:15) (60 - 96)  BP: 129/61 (10 Aug 2019 03:15) (109/58 - 159/69)  BP(mean): 82 (09 Aug 2019 19:00) (82 - 105)  RR: 18 (10 Aug 2019 00:02) (17 - 24)  SpO2: 100% (10 Aug 2019 01:35) (96% - 100%)  I&O's Summary    08 Aug 2019 07:01  -  09 Aug 2019 07:00  --------------------------------------------------------  IN: 10 mL / OUT: 0 mL / NET: 10 mL    09 Aug 2019 07:01  -  10 Aug 2019 06:37  --------------------------------------------------------  IN: 420 mL / OUT: 400 mL / NET: 20 mL          MEDICATIONS  (STANDING):  atorvastatin 20 milliGRAM(s) Oral at bedtime  buDESOnide  80 MICROgram(s)/formoterol 4.5 MICROgram(s) Inhaler 2 Puff(s) Inhalation two times a day  chlorhexidine 4% Liquid 1 Application(s) Topical <User Schedule>  lactated ringers. 1000 milliLiter(s) (75 mL/Hr) IV Continuous <Continuous>  levothyroxine 25 MICROGram(s) Oral daily  pantoprazole Infusion 8 mG/Hr (10 mL/Hr) IV Continuous <Continuous>  potassium chloride  20 mEq/100 mL IVPB 20 milliEquivalent(s) IV Intermittent every 2 hours    MEDICATIONS  (PRN):  ALBUTerol/ipratropium for Nebulization 3 milliLiter(s) Nebulizer every 6 hours PRN Shortness of Breath and/or Wheezing        LABS                                            6.7                   Neurophils% (auto):   76.3   (08-10 @ 00:57):    7.22 )-----------(117          Lymphocytes% (auto):  14.3                                          20.7                   Eosinphils% (auto):   0.7      Manual%: Neutrophils x    ; Lymphocytes x    ; Eosinophils x    ; Bands%: x    ; Blasts x                                    142    |  106    |  15                  Calcium: 8.3   / iCa: x      (08-09 @ 11:13)    ----------------------------<  85        Magnesium: x                                3.4     |  24     |  0.8              Phosphorous: x        TPro  6.0    /  Alb  3.2    /  TBili  0.3    /  DBili  x      /  AST  15     /  ALT  12     /  AlkPhos  53     09 Aug 2019 11:13 Transfer Note    Transfer from: 3B  Transfer to:  (  ) Medicine    (  ) Telemetry    (  ) RCU    (  ) Palliative    (  ) Stroke Unit    ( x ) _CCU______________  Accepting physican:      Hospital COURSE:  80 yo F w/ hx of GIB, PUD, diverticulosis, hemorrhoids, HTN, COPD, severe MR, HFpEF, hypothyroidism p/w GIB. Pt had 1 episode of "burgundy" colored liquid in toilet bowel with formed stools, then another episode at night time. Pt admits to taking 4 baby aspirin the day before for back pain. Pt admits to lightheadedness and EMS found BP of 80/50 by EMS. Pt had hx of GIB Aug 2018, EGD significant for gastritis, duodenitis, and PUD, colonoscopy showed diverticulosis, polyps, and internal/external hemorrhoids. She was to follow up with repeat colonoscopy in 3 months but never did.    In ED, hgb down to 6.4 from baseline of 10 in June 2019, rectal exam showed streak of red blood, placed on Protonix drip and given 2u PRBC (09 Aug 2019 04:41).  Patient had episode of LGIB this evening, Hb found to be 6.7 from 8.8, 2 units PRBC transfused. CT angio showed bleeding in right colon. Patient is currently normotensive, no tachycardia, feels lightheaded and weak.         ASSESSMENT & PLAN:   79 Y F with pmh of GIB, PUD, diverticulosis, hemorrhoids, HTN, COPD, severe MR, HFpEF, hypothyroidism p/w GIB. CT showed bleeding in right colon, s/p 2 unit PRBC.     1) LGIB  - Upgrade to CCU  - s/p 2 unit PRBC this evening, pending repeat CBC  - CT angio: Intraluminal contrast extravasation at the ascending colon, most consistent with active gastrointestinal hemorrhage.  - IR consulted, who stated that intervention not urgent at the moment and will follow up later this morning  - Currently NPO  - GI following.      For Follow-Up:  CBC        Vital Signs Last 24 Hrs  T(C): 35.6 (10 Aug 2019 03:15), Max: 36.9 (09 Aug 2019 07:54)  T(F): 96 (10 Aug 2019 03:15), Max: 98.4 (09 Aug 2019 07:54)  HR: 96 (10 Aug 2019 03:15) (60 - 96)  BP: 129/61 (10 Aug 2019 03:15) (109/58 - 159/69)  BP(mean): 82 (09 Aug 2019 19:00) (82 - 105)  RR: 18 (10 Aug 2019 00:02) (17 - 24)  SpO2: 100% (10 Aug 2019 01:35) (96% - 100%)  I&O's Summary    08 Aug 2019 07:01  -  09 Aug 2019 07:00  --------------------------------------------------------  IN: 10 mL / OUT: 0 mL / NET: 10 mL    09 Aug 2019 07:01  -  10 Aug 2019 06:37  --------------------------------------------------------  IN: 420 mL / OUT: 400 mL / NET: 20 mL          MEDICATIONS  (STANDING):  atorvastatin 20 milliGRAM(s) Oral at bedtime  buDESOnide  80 MICROgram(s)/formoterol 4.5 MICROgram(s) Inhaler 2 Puff(s) Inhalation two times a day  chlorhexidine 4% Liquid 1 Application(s) Topical <User Schedule>  lactated ringers. 1000 milliLiter(s) (75 mL/Hr) IV Continuous <Continuous>  levothyroxine 25 MICROGram(s) Oral daily  pantoprazole Infusion 8 mG/Hr (10 mL/Hr) IV Continuous <Continuous>  potassium chloride  20 mEq/100 mL IVPB 20 milliEquivalent(s) IV Intermittent every 2 hours    MEDICATIONS  (PRN):  ALBUTerol/ipratropium for Nebulization 3 milliLiter(s) Nebulizer every 6 hours PRN Shortness of Breath and/or Wheezing        LABS                                            6.7                   Neurophils% (auto):   76.3   (08-10 @ 00:57):    7.22 )-----------(117          Lymphocytes% (auto):  14.3                                          20.7                   Eosinphils% (auto):   0.7      Manual%: Neutrophils x    ; Lymphocytes x    ; Eosinophils x    ; Bands%: x    ; Blasts x                                    142    |  106    |  15                  Calcium: 8.3   / iCa: x      (08-09 @ 11:13)    ----------------------------<  85        Magnesium: x                                3.4     |  24     |  0.8              Phosphorous: x        TPro  6.0    /  Alb  3.2    /  TBili  0.3    /  DBili  x      /  AST  15     /  ALT  12     /  AlkPhos  53     09 Aug 2019 11:13

## 2019-08-10 NOTE — PROGRESS NOTE ADULT - SUBJECTIVE AND OBJECTIVE BOX
Patient is a 79y old  Female who presents with a chief complaint of GIB (09 Aug 2019 17:49)    OVER NIGHT EVENTS:  Upgraded for active ascending colon bleed and BRBPR;     PHYSICAL EXAM    ICU Vital Signs Last 24 Hrs  T(C): 35.6 (10 Aug 2019 03:15), Max: 36.9 (09 Aug 2019 12:00)  T(F): 96 (10 Aug 2019 03:15), Max: 98.4 (09 Aug 2019 12:00)  HR: 92 (10 Aug 2019 06:58) (60 - 96)  BP: 108/58 (10 Aug 2019 06:58) (108/58 - 159/69)  BP(mean): 82 (09 Aug 2019 19:00) (82 - 105)  RR: 18 (10 Aug 2019 06:58) (17 - 24)  SpO2: 100% (10 Aug 2019 01:35) (96% - 100%)  I&O's Detail    09 Aug 2019 07:01  -  10 Aug 2019 07:00  --------------------------------------------------------  IN:    Oral Fluid: 300 mL    pantoprazole Infusion: 120 mL  Total IN: 420 mL    OUT:    Voided: 400 mL  Total OUT: 400 mL    Total NET: 20 mL    General: Comfortable in bed  HEENT:  PERRLA   Lymph node: No palpable LN             Lungs: CTA   Cardiovascular: RRR, S1S2  Abdomen: BS+ve; soft non tender  Extremities: No LE edema  Skin:  No evident Rash  Neurological:  AAOx3; No focal deficit    LABS:                          6.7    7.22  )-----------( 117      ( 10 Aug 2019 00:57 )             20.7                                                08-09    142  |  106  |  15  ----------------------------<  85  3.4<L>   |  24  |  0.8    Ca    8.3<L>      09 Aug 2019 11:13    TPro  6.0  /  Alb  3.2<L>  /  TBili  0.3  /  DBili  x   /  AST  15  /  ALT  12  /  AlkPhos  53  08-09    PT/INR - ( 09 Aug 2019 00:30 )   PT: 12.70 sec;   INR: 1.11 ratio      PTT - ( 09 Aug 2019 00:30 )  PTT:28.2 sec                                           CARDIAC MARKERS ( 09 Aug 2019 11:13 )  x     / <0.01 ng/mL / x     / x     / x      CARDIAC MARKERS ( 09 Aug 2019 01:40 )  x     / <0.01 ng/mL / x     / x     / x        LIVER FUNCTIONS - ( 09 Aug 2019 11:13 )  Alb: 3.2 g/dL / Pro: 6.0 g/dL / ALK PHOS: 53 U/L / ALT: 12 U/L / AST: 15 U/L / GGT: x           Lactate (08-09-19 @ 01:01): 1.5      MEDICATIONS  (STANDING):  atorvastatin 20 milliGRAM(s) Oral at bedtime  buDESOnide  80 MICROgram(s)/formoterol 4.5 MICROgram(s) Inhaler 2 Puff(s) Inhalation two times a day  chlorhexidine 4% Liquid 1 Application(s) Topical <User Schedule>  lactated ringers. 1000 milliLiter(s) (75 mL/Hr) IV Continuous <Continuous>  levothyroxine 25 MICROGram(s) Oral daily  pantoprazole Infusion 8 mG/Hr (10 mL/Hr) IV Continuous <Continuous>  potassium chloride  20 mEq/100 mL IVPB 20 milliEquivalent(s) IV Intermittent every 2 hours    MEDICATIONS  (PRN):  ALBUTerol/ipratropium for Nebulization 3 milliLiter(s) Nebulizer every 6 hours PRN Shortness of Breath and/or Wheezing      Radiology:  < from: CT Angio Abdomen and Pelvis w/ IV Cont (08.10.19 @ 03:34) >  IMPRESSION:    Intraluminal contrast extravasation at the ascending colon, most   consistent with active gastrointestinal hemorrhage.    < end of copied text >    < from: Transthoracic Echocardiogram (08.20.18 @ 08:58) >  Summary:   1. LV Ejection Fraction by Burton's Method with a biplane EF of 64 %.   2. Elevated mean left atrial pressure.   3. Mild to moderately increased left ventricular internal cavity size.   4. Spectral Doppler shows pseudonormal pattern of left ventricular   myocardial filling (Grade II diastolic dysfunction).   5. Moderately enlarged left atrium.   6. Normal right atrial size.   7. Degenerative mitral valve.   8. Severe mitral valve regurgitation.   9. Thickening of the anterior and posterior mitral valve leaflets.  10. Eccentric posteriorly directed Mitral regugitation jet. Vena   contracta width meaured at the parasternal window is 0.76 cm consistane   with severe MR.  11. Moderate tricuspid regurgitation.  12. AV leaflets not well seen on short axis view. Probable mild aortic   sclerosis without stenosis,.  13. Estimated pulmonary artery systolic pressure is 45.4 mmHg assuming a   right atrial pressure of 8 mmHg, which is consistent with mild pulmonary   hypertension.  14. Pulmonary hypertension is present.  15. LA volume Index is 49.0 ml/m² ml/m2    < end of copied text >

## 2019-08-10 NOTE — PROGRESS NOTE ADULT - SUBJECTIVE AND OBJECTIVE BOX
INTERVENTIONAL RADIOLOGY CONSULT:     Procedure Requested:     HPI:  80 yo F w/ hx of GIB, PUD, diverticulosis, hemorrhoids, HTN, COPD, severe MR, HFpEF, hypothyroidism p/w GIB. Pt had 1 episode of "burgundy" colored liquid in toilet bowel with formed stools, then another episode at night time. Pt admits to taking 4 baby aspirin the day before for back pain. Pt admits to lightheadedness and EMS found BP of 80/50 by EMS. Pt had hx of GIB Aug 2018, EGD significant for gastritis, duodenitis, and PUD, colonoscopy showed diverticulosis, polyps, and internal/external hemorrhoids. She was to follow up with repeat colonoscopy in 3 months but never did.    In ED, hgb down to 6.4 from baseline of 10 in June 2019, rectal exam showed streak of red blood, placed on Protonix drip and given 2u PRBC (09 Aug 2019 04:41)      PAST MEDICAL & SURGICAL HISTORY:  Other specified hypothyroidism  Severe mitral regurgitation  COPD (chronic obstructive pulmonary disease)  CHF (congestive heart failure)  HTN (hypertension)  History of cholecystectomy      MEDICATIONS  (STANDING):  atorvastatin 20 milliGRAM(s) Oral at bedtime  buDESOnide  80 MICROgram(s)/formoterol 4.5 MICROgram(s) Inhaler 2 Puff(s) Inhalation two times a day  chlorhexidine 4% Liquid 1 Application(s) Topical <User Schedule>  lactated ringers. 1000 milliLiter(s) (50 mL/Hr) IV Continuous <Continuous>  levothyroxine 25 MICROGram(s) Oral daily  pantoprazole  Injectable 40 milliGRAM(s) IV Push daily    MEDICATIONS  (PRN):  ALBUTerol/ipratropium for Nebulization 3 milliLiter(s) Nebulizer every 6 hours PRN Shortness of Breath and/or Wheezing      Allergies    No Known Allergies    Intolerances        Social History:   Smoking: Yes [ ]  No [ ]   ______pk yrs  ETOH  Yes [ ]  No [ ]  Social [ ]  DRUGS:  Yes [ ]  No [ ]  if so what______________    FAMILY HISTORY:  No pertinent family history in first degree relatives      Physical Exam:   Vital Signs Last 24 Hrs  T(C): 36.4 (10 Aug 2019 16:00), Max: 37.1 (10 Aug 2019 12:00)  T(F): 97.6 (10 Aug 2019 16:00), Max: 98.8 (10 Aug 2019 12:00)  HR: 82 (10 Aug 2019 18:30) (74 - 96)  BP: 119/60 (10 Aug 2019 18:30) (108/58 - 151/68)  BP(mean): 80 (10 Aug 2019 18:30) (77 - 97)  RR: 24 (10 Aug 2019 18:30) (18 - 29)  SpO2: 99% (10 Aug 2019 18:30) (97% - 100%)        Labs:                         8.2    7.33  )-----------( 94       ( 10 Aug 2019 17:36 )             25.2     08-09    142  |  106  |  15  ----------------------------<  85  3.4<L>   |  24  |  0.8    Ca    8.3<L>      09 Aug 2019 11:13    TPro  6.0  /  Alb  3.2<L>  /  TBili  0.3  /  DBili  x   /  AST  15  /  ALT  12  /  AlkPhos  53  08-09    PT/INR - ( 09 Aug 2019 00:30 )   PT: 12.70 sec;   INR: 1.11 ratio         PTT - ( 09 Aug 2019 00:30 )  PTT:28.2 sec    Pertinent labs:                      8.2    7.33  )-----------( 94       ( 10 Aug 2019 17:36 )             25.2       08-09    142  |  106  |  15  ----------------------------<  85  3.4<L>   |  24  |  0.8    Ca    8.3<L>      09 Aug 2019 11:13    TPro  6.0  /  Alb  3.2<L>  /  TBili  0.3  /  DBili  x   /  AST  15  /  ALT  12  /  AlkPhos  53  08-09      PT/INR - ( 09 Aug 2019 00:30 )   PT: 12.70 sec;   INR: 1.11 ratio         PTT - ( 09 Aug 2019 00:30 )  PTT:28.2 sec    Radiology & Additional Studies:     Radiology imaging reviewed.       ASSESSMENT/ PLAN:     79 yoF with history of GIB and multiple other medical problems p/w LGIB, likely induced by taking 4 aspirins the day before presentation.  +CTA.  Pt has been HD stable since admission and has not had a bowel movement since 6am.  Hg has been stable since 2 units of blood  -Continue medical management, as most slow LGIB in HD stable patients cease on their own, without any intervention  -Strongly consider giving platelets, as the large ASA dose is making her platelets non-functional.   Also platelets are low, now 94. Hold ASA  -If patient becomes HD unstable, will consider emergent angiogram  -If there is another drop in Hg, transfuse PRN, and keep NPO for possible urgent angiogram  -If Hb continues to be stable, consider downgrade and follow up colonoscopy on Monday morning  -IR is on board and will continue to follow    Thank you for the courtesy of this consult, please call [phone] with any further questions.

## 2019-08-10 NOTE — PROGRESS NOTE ADULT - ASSESSMENT
IMPRESSION:  Lower GIB s/p 2 PRBC overnight total 4 u  H/O HFpEF and Severe MR  H/O Diverticulosis    PLAN:    CNS: no sedatives    HEENT:  Oral care    PULMONARY:  HOB @ 45 degrees, Cont home inhalers;     CARDIOVASCULAR: I=O,     GI:  GI follow up, NPO; IR for embolization today; Protonix IV;     RENAL:  F/u  lytes.  Correct as needed. accurate I/O     INFECTIOUS DISEASE: no abx    HEMATOLOGICAL:  DVT prophylaxis- SCD, serial CBC q 8 hrs;     ENDOCRINE:  Follow up FS.  Insulin protocol if needed.    MUSCULOSKELETAL: bedrest    LINES/FRANCOIS:  2- 18G peripherals    CODE STATUS: FULL CODE    DISPOSITION: MICU for now. IMPRESSION:  Lower GIB s/p 2 PRBC overnight total 4 u  H/O HFpEF and Severe MR  H/O Diverticulosis    PLAN:    CNS: no sedatives    HEENT:  Oral care    PULMONARY:  HOB @ 45 degrees, Cont home inhalers; nebs q6 hr and PRN     CARDIOVASCULAR: I=O,  LR at 50 cc/hr     GI:  GI follow up, NPO; IR for embolization today; Protonix IV;     RENAL:  F/u  lytes.  Correct as needed. accurate I/O     INFECTIOUS DISEASE: no abx    HEMATOLOGICAL:  DVT prophylaxis- SCD, serial CBC q 8 hrs;     ENDOCRINE:  Follow up FS.  Insulin protocol if needed.    MUSCULOSKELETAL: bedrest    LINES/FRANCOIS:  2- 18G peripherals    CODE STATUS: FULL CODE    DISPOSITION: MICU for now.

## 2019-08-10 NOTE — PROGRESS NOTE ADULT - SUBJECTIVE AND OBJECTIVE BOX
Hepatology/GI follow up note: Pt seen and examined at bedside.     For questions and inquiries please page [phone].  For urgent matters or after 5pm and on weekends please page the fellow on call through the GI paging system.    79y Female seen for: LGIB    Subjective/Interval events:  Had large burgundy with red BM over night  Drop in her Hgb to 6.7 from 8.8  SP CTA (+) at the hepatic flexure  IR contacted: non urgent embolization  SP 2 units PRBC  VS remained stable    Review of system  General:  (-) weight loss, (-) fevers  Eyes:  (-) visual changes  CV:  (-) chest pain  Resp: (-) SOB, (-) wheezing  GI: (-) abdominal pain,  (-) nausea, (-) vomiting, (-) dysphagia, (-) diarrhea, (-) constipation, (-) rectal bleeding, (-) melena, (-) hematemesis.  Neuro: (-) confusion, (-) weakness  Psych:  (-) Hallucinations  Heme:  (-) easy bruisability    Past medical/surgical Hx:  PAST MEDICAL & SURGICAL HISTORY:  Other specified hypothyroidism  Severe mitral regurgitation  COPD (chronic obstructive pulmonary disease)  CHF (congestive heart failure)  HTN (hypertension)  History of cholecystectomy    Home Medications:  Last Order Reconciliation Date: 08-09-19 @ 04:56 (Admission Reconciliation)  albuterol 2.5 mg/3 mL (0.083%) inhalation solution: 3 milliliter(s) inhaled every 2 hours, As Needed  AMLODIPINE   TAB 5MG:   aspirin 81 mg oral tablet, chewable: 1 tab(s) orally once a day  ATORVASTATIN TAB 20MG:   Breo Ellipta 100 mcg-25 mcg/inh inhalation powder: 1 puff(s) inhaled once a day  ENALAPRIL    TAB 10MG:   furosemide 40 mg oral tablet: 1 tab(s) orally once a day  LEVOTHYROXIN TAB 25MCG:   metoprolol tartrate 25 mg oral tablet: 1 tab(s) orally 2 times a day  Protonix 40 mg oral delayed release tablet: 2 tab(s) orally 2 times a day  Before breakfast and at bedtime      Allergies:  No Known Allergies      Current Medications:   ALBUTerol/ipratropium for Nebulization 3 milliLiter(s) Nebulizer every 6 hours PRN  atorvastatin 20 milliGRAM(s) Oral at bedtime  buDESOnide  80 MICROgram(s)/formoterol 4.5 MICROgram(s) Inhaler 2 Puff(s) Inhalation two times a day  chlorhexidine 4% Liquid 1 Application(s) Topical <User Schedule>  lactated ringers. 1000 milliLiter(s) IV Continuous <Continuous>  levothyroxine 25 MICROGram(s) Oral daily  pantoprazole  Injectable 40 milliGRAM(s) IV Push daily  potassium chloride  20 mEq/100 mL IVPB 20 milliEquivalent(s) IV Intermittent every 2 hours        Physical exam:  T(C): 35.6 (08-10-19 @ 03:15), Max: 36.9 (08-09-19 @ 12:00)  HR: 92 (08-10-19 @ 06:58) (60 - 96)  BP: 108/58 (08-10-19 @ 06:58) (108/58 - 159/69)  RR: 18 (08-10-19 @ 06:58) (17 - 24)  SpO2: 100% (08-10-19 @ 01:35) (96% - 100%)    GENERAL: NAD  HEAD:  Atraumatic, Normocephalic  EYES: Sclera:NL  NECK: Supple, no JVD or thyromegaly  CHEST/LUNG: Good bilateral air entry  HEART: normal S1, S2. Regular  ABDOMEN: (-) distended, (-) tender, (-) rebound, (+) BS, (-)HSM  EXTREMITIES: (-) edema  NEUROLOGY: (-) asterixis  SKIN: (-) jaundice      Data:                        6.7    7.22  )-----------( 117      ( 10 Aug 2019 00:57 )             20.7     MCV 93.7 (08-10-19)  92.5 (08-09-19)  91.8 (08-09-19)    RDW 16.4 (08-10-19)  16.0 (08-09-19)  15.8 (08-09-19)    HGB trend:  6.7  08-10-19 @ 00:57  8.8  08-09-19 @ 16:26  8.7  08-09-19 @ 11:13  6.4  08-09-19 @ 00:30        WBC trend:  7.22  08-10-19 @ 00:57  6.26  08-09-19 @ 16:26  7.09  08-09-19 @ 11:13  7.37  08-09-19 @ 00:30    08-09    142  |  106  |  15  ----------------------------<  85  3.4<L>   |  24  |  0.8    Ca    8.3<L>      09 Aug 2019 11:13    TPro  6.0  /  Alb  3.2<L>  /  TBili  0.3  /  DBili  x   /  AST  15  /  ALT  12  /  AlkPhos  53  08-09    Liver panel trend:  TBili 0.3   /   AST 15   /   ALT 12   /   AlkP 53   /   Tptn 6.0   /   Alb 3.2    /   DBili --      08-09  TBili <0.2   /   AST 40   /   ALT 15   /   AlkP 46   /   Tptn 6.3   /   Alb 3.1    /   DBili --      08-09        PT/INR - ( 09 Aug 2019 00:30 )   PT: 12.70 sec;   INR: 1.11 ratio         PTT - ( 09 Aug 2019 00:30 )  PTT:28.2 sec

## 2019-08-10 NOTE — PROGRESS NOTE ADULT - ASSESSMENT
78 yo F presents with maroon colored stool, Hx of LGIB likely diverticular in the etiology 1 year ago.     1)GIB- likely LGIB ?diverticular    Rec:  - IR follow up  - NPO  - Trend CBC Q8 in the ICU and transfuse PRN  - If IR embolization successfully performed would prep for monday colonoscopy as the prep 1 year ago was suboptimal.

## 2019-08-11 LAB
ALBUMIN SERPL ELPH-MCNC: 3.5 G/DL — SIGNIFICANT CHANGE UP (ref 3.5–5.2)
ALP SERPL-CCNC: 46 U/L — SIGNIFICANT CHANGE UP (ref 30–115)
ALT FLD-CCNC: 11 U/L — SIGNIFICANT CHANGE UP (ref 0–41)
ANION GAP SERPL CALC-SCNC: 14 MMOL/L — SIGNIFICANT CHANGE UP (ref 7–14)
AST SERPL-CCNC: 26 U/L — SIGNIFICANT CHANGE UP (ref 0–41)
BASOPHILS # BLD AUTO: 0.04 K/UL — SIGNIFICANT CHANGE UP (ref 0–0.2)
BASOPHILS NFR BLD AUTO: 0.6 % — SIGNIFICANT CHANGE UP (ref 0–1)
BILIRUB SERPL-MCNC: 0.5 MG/DL — SIGNIFICANT CHANGE UP (ref 0.2–1.2)
BUN SERPL-MCNC: 8 MG/DL — LOW (ref 10–20)
CALCIUM SERPL-MCNC: 8.7 MG/DL — SIGNIFICANT CHANGE UP (ref 8.5–10.1)
CHLORIDE SERPL-SCNC: 106 MMOL/L — SIGNIFICANT CHANGE UP (ref 98–110)
CO2 SERPL-SCNC: 17 MMOL/L — SIGNIFICANT CHANGE UP (ref 17–32)
CREAT SERPL-MCNC: 0.7 MG/DL — SIGNIFICANT CHANGE UP (ref 0.7–1.5)
EOSINOPHIL # BLD AUTO: 0.22 K/UL — SIGNIFICANT CHANGE UP (ref 0–0.7)
EOSINOPHIL NFR BLD AUTO: 3.2 % — SIGNIFICANT CHANGE UP (ref 0–8)
GLUCOSE SERPL-MCNC: 88 MG/DL — SIGNIFICANT CHANGE UP (ref 70–99)
HCT VFR BLD CALC: 21.3 % — LOW (ref 37–47)
HCT VFR BLD CALC: 29.8 % — LOW (ref 37–47)
HGB BLD-MCNC: 6.9 G/DL — CRITICAL LOW (ref 12–16)
HGB BLD-MCNC: 9.6 G/DL — LOW (ref 12–16)
IMM GRANULOCYTES NFR BLD AUTO: 0.4 % — HIGH (ref 0.1–0.3)
LYMPHOCYTES # BLD AUTO: 1.33 K/UL — SIGNIFICANT CHANGE UP (ref 1.2–3.4)
LYMPHOCYTES # BLD AUTO: 19.2 % — LOW (ref 20.5–51.1)
MAGNESIUM SERPL-MCNC: 2 MG/DL — SIGNIFICANT CHANGE UP (ref 1.8–2.4)
MCHC RBC-ENTMCNC: 29.5 PG — SIGNIFICANT CHANGE UP (ref 27–31)
MCHC RBC-ENTMCNC: 30.1 PG — SIGNIFICANT CHANGE UP (ref 27–31)
MCHC RBC-ENTMCNC: 32.2 G/DL — SIGNIFICANT CHANGE UP (ref 32–37)
MCHC RBC-ENTMCNC: 32.4 G/DL — SIGNIFICANT CHANGE UP (ref 32–37)
MCV RBC AUTO: 91 FL — SIGNIFICANT CHANGE UP (ref 81–99)
MCV RBC AUTO: 93.4 FL — SIGNIFICANT CHANGE UP (ref 81–99)
MONOCYTES # BLD AUTO: 0.79 K/UL — HIGH (ref 0.1–0.6)
MONOCYTES NFR BLD AUTO: 11.4 % — HIGH (ref 1.7–9.3)
NEUTROPHILS # BLD AUTO: 4.51 K/UL — SIGNIFICANT CHANGE UP (ref 1.4–6.5)
NEUTROPHILS NFR BLD AUTO: 65.2 % — SIGNIFICANT CHANGE UP (ref 42.2–75.2)
NRBC # BLD: 0 /100 WBCS — SIGNIFICANT CHANGE UP (ref 0–0)
NRBC # BLD: 0 /100 WBCS — SIGNIFICANT CHANGE UP (ref 0–0)
PLATELET # BLD AUTO: 165 K/UL — SIGNIFICANT CHANGE UP (ref 130–400)
PLATELET # BLD AUTO: 97 K/UL — LOW (ref 130–400)
POTASSIUM SERPL-MCNC: 4.5 MMOL/L — SIGNIFICANT CHANGE UP (ref 3.5–5)
POTASSIUM SERPL-SCNC: 4.5 MMOL/L — SIGNIFICANT CHANGE UP (ref 3.5–5)
PROT SERPL-MCNC: 6.1 G/DL — SIGNIFICANT CHANGE UP (ref 6–8)
RBC # BLD: 2.34 M/UL — LOW (ref 4.2–5.4)
RBC # BLD: 3.19 M/UL — LOW (ref 4.2–5.4)
RBC # FLD: 15.3 % — HIGH (ref 11.5–14.5)
RBC # FLD: 15.9 % — HIGH (ref 11.5–14.5)
SODIUM SERPL-SCNC: 137 MMOL/L — SIGNIFICANT CHANGE UP (ref 135–146)
WBC # BLD: 6.77 K/UL — SIGNIFICANT CHANGE UP (ref 4.8–10.8)
WBC # BLD: 6.92 K/UL — SIGNIFICANT CHANGE UP (ref 4.8–10.8)
WBC # FLD AUTO: 6.77 K/UL — SIGNIFICANT CHANGE UP (ref 4.8–10.8)
WBC # FLD AUTO: 6.92 K/UL — SIGNIFICANT CHANGE UP (ref 4.8–10.8)

## 2019-08-11 PROCEDURE — 71045 X-RAY EXAM CHEST 1 VIEW: CPT | Mod: 26

## 2019-08-11 PROCEDURE — 99233 SBSQ HOSP IP/OBS HIGH 50: CPT

## 2019-08-11 RX ORDER — SOD SULF/SODIUM/NAHCO3/KCL/PEG
4000 SOLUTION, RECONSTITUTED, ORAL ORAL ONCE
Refills: 0 | Status: COMPLETED | OUTPATIENT
Start: 2019-08-11 | End: 2019-08-11

## 2019-08-11 RX ADMIN — Medication 3 MILLILITER(S): at 07:58

## 2019-08-11 RX ADMIN — BUDESONIDE AND FORMOTEROL FUMARATE DIHYDRATE 2 PUFF(S): 160; 4.5 AEROSOL RESPIRATORY (INHALATION) at 20:09

## 2019-08-11 RX ADMIN — Medication 5 MILLIGRAM(S): at 18:56

## 2019-08-11 RX ADMIN — SODIUM CHLORIDE 50 MILLILITER(S): 9 INJECTION, SOLUTION INTRAVENOUS at 21:50

## 2019-08-11 RX ADMIN — PANTOPRAZOLE SODIUM 40 MILLIGRAM(S): 20 TABLET, DELAYED RELEASE ORAL at 11:21

## 2019-08-11 RX ADMIN — CHLORHEXIDINE GLUCONATE 1 APPLICATION(S): 213 SOLUTION TOPICAL at 05:58

## 2019-08-11 RX ADMIN — Medication 25 MICROGRAM(S): at 05:58

## 2019-08-11 RX ADMIN — BUDESONIDE AND FORMOTEROL FUMARATE DIHYDRATE 2 PUFF(S): 160; 4.5 AEROSOL RESPIRATORY (INHALATION) at 11:20

## 2019-08-11 RX ADMIN — Medication 4000 MILLILITER(S): at 18:56

## 2019-08-11 RX ADMIN — ATORVASTATIN CALCIUM 20 MILLIGRAM(S): 80 TABLET, FILM COATED ORAL at 21:50

## 2019-08-11 NOTE — PROGRESS NOTE ADULT - SUBJECTIVE AND OBJECTIVE BOX
Patient is a 79y old  Female who presents with a chief complaint of GIB (10 Aug 2019 20:06)        Over Night Events: s/p 1 unit PRBCs overnight, 5 units total.        ROS:  See HPI    PHYSICAL EXAM    ICU Vital Signs Last 24 Hrs  T(C): 36.7 (11 Aug 2019 08:00), Max: 37.1 (10 Aug 2019 12:00)  T(F): 98 (11 Aug 2019 08:00), Max: 98.8 (10 Aug 2019 12:00)  HR: 74 (11 Aug 2019 08:00) (74 - 90)  BP: 152/78 (11 Aug 2019 08:00) (112/63 - 152/78)  BP(mean): 119 (11 Aug 2019 08:00) (77 - 119)  RR: 19 (11 Aug 2019 08:00) (16 - 29)  SpO2: 98% (11 Aug 2019 09:00) (97% - 100%)      General: NAD  HEENT: TRACE             Lymph Nodes: No cervical LN   Lungs: Bilateral BS  Cardiovascular: Regular   Abdomen: Soft, Positive BS  Extremities: No clubbing   Skin: Warm  Neurological: Non focal       08-10-19 @ 07:01  -  08-11-19 @ 07:00  --------------------------------------------------------  IN:    IV PiggyBack: 100 mL    lactated ringers.: 1012.5 mL    Whole Blood: 302 mL  Total IN: 1414.5 mL    OUT:    Voided: 600 mL  Total OUT: 600 mL    Total NET: 814.5 mL          LABS:                            6.9    6.77  )-----------( 97       ( 10 Aug 2019 23:48 )             21.3                                               08-10    142  |  110  |  11  ----------------------------<  98  3.8   |  24  |  0.8    Ca    7.9<L>      10 Aug 2019 21:01    TPro  6.0  /  Alb  3.2<L>  /  TBili  0.3  /  DBili  x   /  AST  15  /  ALT  12  /  AlkPhos  53  08-09                                                 CARDIAC MARKERS ( 09 Aug 2019 11:13 )  x     / <0.01 ng/mL / x     / x     / x        LIVER FUNCTIONS - ( 09 Aug 2019 11:13 )  Alb: 3.2 g/dL / Pro: 6.0 g/dL / ALK PHOS: 53 U/L / ALT: 12 U/L / AST: 15 U/L / GGT: x                                                                                                                                       MEDICATIONS  (STANDING):  atorvastatin 20 milliGRAM(s) Oral at bedtime  buDESOnide  80 MICROgram(s)/formoterol 4.5 MICROgram(s) Inhaler 2 Puff(s) Inhalation two times a day  chlorhexidine 4% Liquid 1 Application(s) Topical <User Schedule>  lactated ringers. 1000 milliLiter(s) (50 mL/Hr) IV Continuous <Continuous>  levothyroxine 25 MICROGram(s) Oral daily  pantoprazole  Injectable 40 milliGRAM(s) IV Push daily    MEDICATIONS  (PRN):  ALBUTerol/ipratropium for Nebulization 3 milliLiter(s) Nebulizer every 6 hours PRN Shortness of Breath and/or Wheezing      Xrays:      No acute cardiopulmonary disease                                                                               ECHO

## 2019-08-11 NOTE — PROGRESS NOTE ADULT - SUBJECTIVE AND OBJECTIVE BOX
Hospital Day:  2d    Subjective:    Pt was interviewed and examined at the bedside in the AM. Pt was given 1 unit overnight. No symptoms in AM. Repeat Hgb stable.       Past Medical Hx:   Other specified hypothyroidism  Severe mitral regurgitation  COPD (chronic obstructive pulmonary disease)  CHF (congestive heart failure)  HTN (hypertension)    Past Sx:  History of cholecystectomy  No significant past surgical history    Allergies:  No Known Allergies    Current Meds:   Standng Meds:  atorvastatin 20 milliGRAM(s) Oral at bedtime  buDESOnide  80 MICROgram(s)/formoterol 4.5 MICROgram(s) Inhaler 2 Puff(s) Inhalation two times a day  chlorhexidine 4% Liquid 1 Application(s) Topical <User Schedule>  lactated ringers. 1000 milliLiter(s) (50 mL/Hr) IV Continuous <Continuous>  levothyroxine 25 MICROGram(s) Oral daily  pantoprazole  Injectable 40 milliGRAM(s) IV Push daily    PRN Meds:  ALBUTerol/ipratropium for Nebulization 3 milliLiter(s) Nebulizer every 6 hours PRN Shortness of Breath and/or Wheezing    HOME MEDICATIONS:  albuterol 2.5 mg/3 mL (0.083%) inhalation solution: 3 milliliter(s) inhaled every 2 hours, As Needed  AMLODIPINE   TAB 5MG:   aspirin 81 mg oral tablet, chewable: 1 tab(s) orally once a day  ATORVASTATIN TAB 20MG:   Breo Ellipta 100 mcg-25 mcg/inh inhalation powder: 1 puff(s) inhaled once a day  ENALAPRIL    TAB 10MG:   furosemide 40 mg oral tablet: 1 tab(s) orally once a day  LEVOTHYROXIN TAB 25MCG:   metoprolol tartrate 25 mg oral tablet: 1 tab(s) orally 2 times a day      Vital Signs:   T(F): 97.7 (08-11-19 @ 16:00), Max: 98.8 (08-10-19 @ 20:00)  HR: 76 (08-11-19 @ 18:00) (70 - 90)  BP: 187/90 (08-11-19 @ 18:00) (117/58 - 187/90)  RR: 22 (08-11-19 @ 18:00) (15 - 35)  SpO2: 99% (08-11-19 @ 18:00) (97% - 100%)      08-10-19 @ 07:01  -  08-11-19 @ 07:00  --------------------------------------------------------  IN: 1414.5 mL / OUT: 600 mL / NET: 814.5 mL    08-11-19 @ 07:01  -  08-11-19 @ 19:12  --------------------------------------------------------  IN: 814 mL / OUT: 1250 mL / NET: -436 mL        Physical Exam:   GENERAL: NAD  HEENT: NCAT  CHEST/LUNG: CTAB  HEART: Regular rate and rhythm; s1 s2 appreciated, No murmurs, rubs, or gallops  ABDOMEN: Soft, Nontender, Nondistended; Bowel sounds present  EXTREMITIES: No LE edema b/l  NERVOUS SYSTEM:  Alert & Oriented X3        Labs:                         9.6    6.92  )-----------( 165      ( 11 Aug 2019 11:59 )             29.8     Neutophil% 65.2, Lymphocyte% 19.2, Monocyte% 11.4, Bands% 0.4 08-11-19 @ 11:59    11 Aug 2019 11:59    137    |  106    |  8      ----------------------------<  88     4.5     |  17     |  0.7      Ca    8.7        11 Aug 2019 11:59  Mg     2.0       11 Aug 2019 11:59    TPro  6.1    /  Alb  3.5    /  TBili  0.5    /  DBili  x      /  AST  26     /  ALT  11     /  AlkPhos  46     11 Aug 2019 11:59              Troponin <0.01, CKMB --, CK -- 08-09-19 @ 11:13  Troponin <0.01, CKMB --, CK -- 08-09-19 @ 01:40                Radiology:

## 2019-08-11 NOTE — PROGRESS NOTE ADULT - ASSESSMENT
IMPRESSION:  Lower GIB s/p 1 PRBC overnight total 5 u  H/O HFpEF and Severe MR  H/O Diverticulosis    PLAN:    CNS: no sedatives    HEENT:  Oral care    PULMONARY:  HOB @ 45 degrees, Cont home inhalers; nebs q6 hr and PRN     CARDIOVASCULAR: I=O,  LR at 50 cc/hr     GI:  GI follow up, NPO Protonix IV;     RENAL:  F/u  lytes.  Correct as needed. accurate I/O     INFECTIOUS DISEASE: no abx    HEMATOLOGICAL:  DVT prophylaxis- SCD, serial CBC q 8 hrs; give one unit platelets     ENDOCRINE:  Follow up FS.    MUSCULOSKELETAL: bedrest    LINES/FRANCOIS:  2- 18G peripherals    CODE STATUS: FULL CODE    DISPOSITION: MICU for now. IMPRESSION:  Lower GIB s/p 1 PRBC overnight total 5 u  H/O HFpEF and Severe MR  H/O Diverticulosis    PLAN:    CNS: no sedatives    HEENT:  Oral care    PULMONARY:  HOB @ 45 degrees, Cont home inhalers; nebs q6 hr and PRN     CARDIOVASCULAR: I=O,  LR at 50 cc/hr     GI:  GI follow up, NPO Protonix IV;     RENAL:  F/u  lytes.  Correct as needed. accurate I/O     INFECTIOUS DISEASE: no abx    HEMATOLOGICAL:  DVT prophylaxis- SCD, serial CBC q 8 hrs;     ENDOCRINE:  Follow up FS.    MUSCULOSKELETAL: bedrest    LINES/FRANCOIS:  2- 18G peripherals    CODE STATUS: FULL CODE    DISPOSITION: MICU for now.

## 2019-08-11 NOTE — PROGRESS NOTE ADULT - SUBJECTIVE AND OBJECTIVE BOX
Hepatology/GI follow up note: Pt seen and examined at bedside.     For questions and inquiries please page [phone].  For urgent matters or after 5pm and on weekends please page the fellow on call through the GI paging system.    79y Female seen for: LGIB    Subjective/Interval events:  Still having maroon colored BMs  SP transfusions yesterday with adequate response  Hgb dropped to 6.9 again  VS stable    Review of system  General:  (-) weight loss, (-) fevers  Eyes:  (-) visual changes  CV:  (-) chest pain  Resp: (-) SOB, (-) wheezing  GI: (-) abdominal pain,  (-) nausea, (-) vomiting, (-) dysphagia, (-) diarrhea, (-) constipation, (+) rectal bleeding, (-) melena, (-) hematemesis.  Neuro: (-) confusion, (-) weakness  Psych:  (-) Hallucinations  Heme:  (-) easy bruisability    Past medical/surgical Hx:  PAST MEDICAL & SURGICAL HISTORY:  Other specified hypothyroidism  Severe mitral regurgitation  COPD (chronic obstructive pulmonary disease)  CHF (congestive heart failure)  HTN (hypertension)  History of cholecystectomy    Home Medications:  Last Order Reconciliation Date: 08-09-19 @ 04:56 (Admission Reconciliation)  albuterol 2.5 mg/3 mL (0.083%) inhalation solution: 3 milliliter(s) inhaled every 2 hours, As Needed  AMLODIPINE   TAB 5MG:   aspirin 81 mg oral tablet, chewable: 1 tab(s) orally once a day  ATORVASTATIN TAB 20MG:   Breo Ellipta 100 mcg-25 mcg/inh inhalation powder: 1 puff(s) inhaled once a day  ENALAPRIL    TAB 10MG:   furosemide 40 mg oral tablet: 1 tab(s) orally once a day  LEVOTHYROXIN TAB 25MCG:   metoprolol tartrate 25 mg oral tablet: 1 tab(s) orally 2 times a day  Protonix 40 mg oral delayed release tablet: 2 tab(s) orally 2 times a day  Before breakfast and at bedtime      Allergies:  No Known Allergies      Current Medications:   ALBUTerol/ipratropium for Nebulization 3 milliLiter(s) Nebulizer every 6 hours PRN  atorvastatin 20 milliGRAM(s) Oral at bedtime  buDESOnide  80 MICROgram(s)/formoterol 4.5 MICROgram(s) Inhaler 2 Puff(s) Inhalation two times a day  chlorhexidine 4% Liquid 1 Application(s) Topical <User Schedule>  lactated ringers. 1000 milliLiter(s) IV Continuous <Continuous>  levothyroxine 25 MICROGram(s) Oral daily  pantoprazole  Injectable 40 milliGRAM(s) IV Push daily        Physical exam:  T(C): 36.7 (08-11-19 @ 08:00), Max: 37.1 (08-10-19 @ 12:00)  HR: 78 (08-11-19 @ 10:00) (74 - 90)  BP: 142/77 (08-11-19 @ 10:00) (112/63 - 152/78)  RR: 35 (08-11-19 @ 10:00) (16 - 35)  SpO2: 100% (08-11-19 @ 10:00) (97% - 100%)    GENERAL: NAD  HEAD:  Atraumatic, Normocephalic  EYES: Sclera:NL  NECK: Supple, no JVD or thyromegaly  CHEST/LUNG: Good bilateral air entry  HEART: normal S1, S2. Regular  ABDOMEN: (-) distended, (-) tender, (-) rebound, (+) BS, (-)HSM  EXTREMITIES: (-) edema  NEUROLOGY: (-) asterixis  SKIN: (-) jaundice        Data:                        6.9    6.77  )-----------( 97       ( 10 Aug 2019 23:48 )             21.3     MCV 91.0 (08-10-19)  89.7 (08-10-19)  91.3 (08-10-19)  90.6 (08-10-19)    RDW 15.9 (08-10-19)  15.7 (08-10-19)  15.9 (08-10-19)  15.6 (08-10-19)    HGB trend:  6.9  08-10-19 @ 23:48  7.3  08-10-19 @ 21:01  8.2  08-10-19 @ 17:36  8.3  08-10-19 @ 13:13  6.7  08-10-19 @ 00:57  8.8  08-09-19 @ 16:26  8.7  08-09-19 @ 11:13  6.4  08-09-19 @ 00:30      08-10-19 @ 07:01  -  08-11-19 @ 07:00  --------------------------------------------------------  IN: 302 mL      WBC trend:  6.77  08-10-19 @ 23:48  6.69  08-10-19 @ 21:01  7.33  08-10-19 @ 17:36  7.26  08-10-19 @ 13:13  7.22  08-10-19 @ 00:57  6.26  08-09-19 @ 16:26  7.09  08-09-19 @ 11:13  7.37  08-09-19 @ 00:30    08-10    142  |  110  |  11  ----------------------------<  98  3.8   |  24  |  0.8    Ca    7.9<L>      10 Aug 2019 21:01    TPro  6.0  /  Alb  3.2<L>  /  TBili  0.3  /  DBili  x   /  AST  15  /  ALT  12  /  AlkPhos  53  08-09    Liver panel trend:  TBili 0.3   /   AST 15   /   ALT 12   /   AlkP 53   /   Tptn 6.0   /   Alb 3.2    /   DBili --      08-09  TBili <0.2   /   AST 40   /   ALT 15   /   AlkP 46   /   Tptn 6.3   /   Alb 3.1    /   DBili --      08-09

## 2019-08-11 NOTE — PROGRESS NOTE ADULT - ASSESSMENT
80 yo F presents with maroon colored stool, Hx of LGIB likely diverticular in the etiology 1 year ago.     1)GIB- likely LGIB ?diverticular active (+) CTA    Rec:  - IR follow up if no plans for embolization prep for colonoscopy in the AM  - NPO to switch to clears if cleared by IR  - Trend CBC Q8 in the ICU and transfuse PRN  - Monday colonoscopy discussed with CCU

## 2019-08-11 NOTE — PROGRESS NOTE ADULT - ASSESSMENT
78 yo F w/ hx of GIB, PUD, diverticulosis, hemorrhoids, HTN, COPD, severe MR, HFpEF, hypothyroidism p/w GIB. Hgb on admission 6.4.      Lower GI bleed/ Acute blood loss anemia  - CCU monitoring   - CBC Q 6 hours, NPO,   - Protonix IV daily   - IR following, Hgb improved today no intervention   - GI following   - Bowel prep today   - Colonoscopy in AM  - NPO after midnight   - hold ASA  - pt received 5 units of PRBC since admission, keep Hb above 7.5 , keep type and cross active      H/O COPD  -stable, Nebs PRN  - supplement oxygen, monitor pulse Ox     HTN  - c/w home medications     H/O MR  - monitor for volume overload ( pt received 4 units of PRBC )     Hypothyroid  - c/w Synthroid     DVT prophylaxis   - SCD b/l LE     Diet; NPO   Dispo;CCU  FULL CODE

## 2019-08-12 ENCOUNTER — RESULT REVIEW (OUTPATIENT)
Age: 79
End: 2019-08-12

## 2019-08-12 ENCOUNTER — TRANSCRIPTION ENCOUNTER (OUTPATIENT)
Age: 79
End: 2019-08-12

## 2019-08-12 LAB
ALBUMIN SERPL ELPH-MCNC: 3.4 G/DL — LOW (ref 3.5–5.2)
ALP SERPL-CCNC: 49 U/L — SIGNIFICANT CHANGE UP (ref 30–115)
ALT FLD-CCNC: 11 U/L — SIGNIFICANT CHANGE UP (ref 0–41)
ANION GAP SERPL CALC-SCNC: 12 MMOL/L — SIGNIFICANT CHANGE UP (ref 7–14)
AST SERPL-CCNC: 20 U/L — SIGNIFICANT CHANGE UP (ref 0–41)
BILIRUB SERPL-MCNC: 0.5 MG/DL — SIGNIFICANT CHANGE UP (ref 0.2–1.2)
BLD GP AB SCN SERPL QL: SIGNIFICANT CHANGE UP
BUN SERPL-MCNC: 6 MG/DL — LOW (ref 10–20)
CALCIUM SERPL-MCNC: 8.6 MG/DL — SIGNIFICANT CHANGE UP (ref 8.5–10.1)
CHLORIDE SERPL-SCNC: 105 MMOL/L — SIGNIFICANT CHANGE UP (ref 98–110)
CO2 SERPL-SCNC: 25 MMOL/L — SIGNIFICANT CHANGE UP (ref 17–32)
CREAT SERPL-MCNC: 0.6 MG/DL — LOW (ref 0.7–1.5)
GLUCOSE SERPL-MCNC: 80 MG/DL — SIGNIFICANT CHANGE UP (ref 70–99)
HCT VFR BLD CALC: 27.1 % — LOW (ref 37–47)
HCT VFR BLD CALC: 29.2 % — LOW (ref 37–47)
HCT VFR BLD CALC: 29.7 % — LOW (ref 37–47)
HGB BLD-MCNC: 9 G/DL — LOW (ref 12–16)
HGB BLD-MCNC: 9.5 G/DL — LOW (ref 12–16)
HGB BLD-MCNC: 9.7 G/DL — LOW (ref 12–16)
MAGNESIUM SERPL-MCNC: 1.8 MG/DL — SIGNIFICANT CHANGE UP (ref 1.8–2.4)
MCHC RBC-ENTMCNC: 30 PG — SIGNIFICANT CHANGE UP (ref 27–31)
MCHC RBC-ENTMCNC: 30.2 PG — SIGNIFICANT CHANGE UP (ref 27–31)
MCHC RBC-ENTMCNC: 30.3 PG — SIGNIFICANT CHANGE UP (ref 27–31)
MCHC RBC-ENTMCNC: 32.5 G/DL — SIGNIFICANT CHANGE UP (ref 32–37)
MCHC RBC-ENTMCNC: 32.7 G/DL — SIGNIFICANT CHANGE UP (ref 32–37)
MCHC RBC-ENTMCNC: 33.2 G/DL — SIGNIFICANT CHANGE UP (ref 32–37)
MCV RBC AUTO: 90.9 FL — SIGNIFICANT CHANGE UP (ref 81–99)
MCV RBC AUTO: 92.1 FL — SIGNIFICANT CHANGE UP (ref 81–99)
MCV RBC AUTO: 92.8 FL — SIGNIFICANT CHANGE UP (ref 81–99)
NRBC # BLD: 0 /100 WBCS — SIGNIFICANT CHANGE UP (ref 0–0)
PLATELET # BLD AUTO: 185 K/UL — SIGNIFICANT CHANGE UP (ref 130–400)
PLATELET # BLD AUTO: 189 K/UL — SIGNIFICANT CHANGE UP (ref 130–400)
PLATELET # BLD AUTO: 197 K/UL — SIGNIFICANT CHANGE UP (ref 130–400)
POTASSIUM SERPL-MCNC: 3.9 MMOL/L — SIGNIFICANT CHANGE UP (ref 3.5–5)
POTASSIUM SERPL-SCNC: 3.9 MMOL/L — SIGNIFICANT CHANGE UP (ref 3.5–5)
PROT SERPL-MCNC: 5.8 G/DL — LOW (ref 6–8)
RBC # BLD: 2.98 M/UL — LOW (ref 4.2–5.4)
RBC # BLD: 3.17 M/UL — LOW (ref 4.2–5.4)
RBC # BLD: 3.2 M/UL — LOW (ref 4.2–5.4)
RBC # FLD: 14.8 % — HIGH (ref 11.5–14.5)
RBC # FLD: 14.9 % — HIGH (ref 11.5–14.5)
RBC # FLD: 15.2 % — HIGH (ref 11.5–14.5)
SODIUM SERPL-SCNC: 142 MMOL/L — SIGNIFICANT CHANGE UP (ref 135–146)
WBC # BLD: 6.48 K/UL — SIGNIFICANT CHANGE UP (ref 4.8–10.8)
WBC # BLD: 6.52 K/UL — SIGNIFICANT CHANGE UP (ref 4.8–10.8)
WBC # BLD: 8.5 K/UL — SIGNIFICANT CHANGE UP (ref 4.8–10.8)
WBC # FLD AUTO: 6.48 K/UL — SIGNIFICANT CHANGE UP (ref 4.8–10.8)
WBC # FLD AUTO: 6.52 K/UL — SIGNIFICANT CHANGE UP (ref 4.8–10.8)
WBC # FLD AUTO: 8.5 K/UL — SIGNIFICANT CHANGE UP (ref 4.8–10.8)

## 2019-08-12 PROCEDURE — 99222 1ST HOSP IP/OBS MODERATE 55: CPT

## 2019-08-12 PROCEDURE — 45385 COLONOSCOPY W/LESION REMOVAL: CPT

## 2019-08-12 PROCEDURE — 88305 TISSUE EXAM BY PATHOLOGIST: CPT | Mod: 26

## 2019-08-12 PROCEDURE — 99233 SBSQ HOSP IP/OBS HIGH 50: CPT | Mod: 25

## 2019-08-12 PROCEDURE — 71045 X-RAY EXAM CHEST 1 VIEW: CPT | Mod: 26

## 2019-08-12 RX ORDER — MULTIVIT WITH MIN/MFOLATE/K2 340-15/3 G
1 POWDER (GRAM) ORAL ONCE
Refills: 0 | Status: COMPLETED | OUTPATIENT
Start: 2019-08-12 | End: 2019-08-12

## 2019-08-12 RX ADMIN — ATORVASTATIN CALCIUM 20 MILLIGRAM(S): 80 TABLET, FILM COATED ORAL at 21:46

## 2019-08-12 RX ADMIN — CHLORHEXIDINE GLUCONATE 1 APPLICATION(S): 213 SOLUTION TOPICAL at 05:44

## 2019-08-12 RX ADMIN — BUDESONIDE AND FORMOTEROL FUMARATE DIHYDRATE 2 PUFF(S): 160; 4.5 AEROSOL RESPIRATORY (INHALATION) at 21:46

## 2019-08-12 RX ADMIN — Medication 25 MICROGRAM(S): at 05:44

## 2019-08-12 RX ADMIN — BUDESONIDE AND FORMOTEROL FUMARATE DIHYDRATE 2 PUFF(S): 160; 4.5 AEROSOL RESPIRATORY (INHALATION) at 07:50

## 2019-08-12 RX ADMIN — Medication 1 BOTTLE: at 11:32

## 2019-08-12 RX ADMIN — PANTOPRAZOLE SODIUM 40 MILLIGRAM(S): 20 TABLET, DELAYED RELEASE ORAL at 15:18

## 2019-08-12 NOTE — CONSULT NOTE ADULT - SUBJECTIVE AND OBJECTIVE BOX
HPI:  78 yo F w/ hx of GIB, PUD, diverticulosis, hemorrhoids, HTN, COPD, severe MR, HFpEF, hypothyroidism p/w GIB. Pt had 1 episode of "burgundy" colored liquid in toilet bowel with formed stools, then another episode at night time. Pt admits to taking 4 baby aspirin the day before for back pain. Pt admits to lightheadedness and EMS found BP of 80/50 by EMS. Pt had hx of GIB Aug 2018, EGD significant for gastritis, duodenitis, and PUD, colonoscopy showed diverticulosis, polyps, and internal/external hemorrhoids. She was to follow up with repeat colonoscopy in 3 months but never did.    In ED, hgb down to 6.4 from baseline of 10 in June 2019, rectal exam showed streak of red blood, placed on Protonix drip and given 2u PRBC (09 Aug 2019 04:41)    Cardiology note:   79 female with non-obstructive CAD, PUD, diverticulosis, severe MR, HFpEF presenting for GIB. Patient was seen many times by cardiology inpatient for evaluation of her MR, but she never followed up as outpatient. She has a poor functional status at baseline, denies chest pain, THOMAS, LE swelling, PND or orthopnea.       PAST MEDICAL & SURGICAL HISTORY  Other specified hypothyroidism  Severe mitral regurgitation  COPD (chronic obstructive pulmonary disease)  CHF (congestive heart failure)  HTN (hypertension)  History of cholecystectomy      FAMILY HISTORY:  FAMILY HISTORY:  No pertinent family history in first degree relatives      SOCIAL HISTORY:  []Smoker  []Alcohol  []Drug    ALLERGIES:  No Known Allergies      MEDICATIONS:  MEDICATIONS  (STANDING):  atorvastatin 20 milliGRAM(s) Oral at bedtime  buDESOnide  80 MICROgram(s)/formoterol 4.5 MICROgram(s) Inhaler 2 Puff(s) Inhalation two times a day  chlorhexidine 4% Liquid 1 Application(s) Topical <User Schedule>  lactated ringers. 1000 milliLiter(s) (50 mL/Hr) IV Continuous <Continuous>  levothyroxine 25 MICROGram(s) Oral daily  pantoprazole  Injectable 40 milliGRAM(s) IV Push daily    MEDICATIONS  (PRN):  ALBUTerol/ipratropium for Nebulization 3 milliLiter(s) Nebulizer every 6 hours PRN Shortness of Breath and/or Wheezing      HOME MEDICATIONS:  Home Medications:  albuterol 2.5 mg/3 mL (0.083%) inhalation solution: 3 milliliter(s) inhaled every 2 hours, As Needed (09 Aug 2019 04:36)  AMLODIPINE   TAB 5MG:  (09 Aug 2019 04:36)  aspirin 81 mg oral tablet, chewable: 1 tab(s) orally once a day (09 Aug 2019 04:36)  ATORVASTATIN TAB 20MG:  (09 Aug 2019 04:36)  Breo Ellipta 100 mcg-25 mcg/inh inhalation powder: 1 puff(s) inhaled once a day (09 Aug 2019 04:36)  ENALAPRIL    TAB 10MG:  (09 Aug 2019 04:36)  furosemide 40 mg oral tablet: 1 tab(s) orally once a day (09 Aug 2019 04:36)  LEVOTHYROXIN TAB 25MCG:  (09 Aug 2019 04:36)  metoprolol tartrate 25 mg oral tablet: 1 tab(s) orally 2 times a day (09 Aug 2019 04:36)      VITALS:   T(F): 97.6 (08-12 @ 08:14), Max: 98.8 (08-10 @ 12:00)  HR: 100 (08-12 @ 11:39) (60 - 100)  BP: 140/98 (08-12 @ 11:39) (108/58 - 187/90)  BP(mean): 114 (08-12 @ 11:39) (77 - 146)  RR: 23 (08-12 @ 11:39) (15 - 43)  SpO2: 100% (08-12 @ 08:14) (96% - 100%)    I&O's Summary    11 Aug 2019 07:01  -  12 Aug 2019 07:00  --------------------------------------------------------  IN: 1414 mL / OUT: 1702 mL / NET: -288 mL    12 Aug 2019 07:01  -  12 Aug 2019 12:01  --------------------------------------------------------  IN: 100 mL / OUT: 0 mL / NET: 100 mL        REVIEW OF SYSTEMS:  CONSTITUTIONAL: No weakness, fevers or chills  EYES/ENT: No visual changes;  No vertigo or throat pain   NECK: No pain or stiffness  RESPIRATORY: No cough, wheezing, hemoptysis; No shortness of breath  CARDIOVASCULAR: see HPI  GASTROINTESTINAL: see HPI  GENITOURINARY: No dysuria, frequency or hematuria  NEUROLOGICAL: No numbness or weakness  SKIN: No itching, no rashes    PHYSICAL EXAM:  NEURO: patient is awake , alert and oriented  GEN: Not in acute distress  NECK: no thyroid enlargement, no JVD  LUNGS: Clear to auscultation bilaterally   CARDIOVASCULAR: S1/S2 present, RRR , loud systolic murmur over the apex. No rubs, no carotid bruits,  + PP bilaterally  ABD: Soft, non-tender, non-distended, +BS  EXT: No HIRAM  SKIN: Intact    LABS:                        9.0    6.48  )-----------( 185      ( 12 Aug 2019 04:42 )             27.1     08-12    142  |  105  |  6<L>  ----------------------------<  80  3.9   |  25  |  0.6<L>    Ca    8.6      12 Aug 2019 04:42  Mg     1.8     08-12    TPro  5.8<L>  /  Alb  3.4<L>  /  TBili  0.5  /  DBili  x   /  AST  20  /  ALT  11  /  AlkPhos  49  08-12          RADIOLOGY:  -CXR: no opacities or effusions  -TTE: < from: Transthoracic Echocardiogram (08.10.19 @ 10:23) >   1. LV Ejection Fraction by Burton's Method with a biplane EF of 67 %.   2. Moderately increased LV wall thickness.   3. Spectral Doppler shows impaired relaxation pattern of left   ventricular myocardial filling (Grade I diastolic dysfunction).   4. Degenerative mitral valve.   5. Moderate thickening of the anterior and posterior mitral valve   leaflets.   6. Severe mitral valve regurgitation.   7. Mild-moderate tricuspid regurgitation.   8. Sclerotic aortic valve with normal opening.   9. Estimated pulmonary artery systolic pressure is 45.6 mmHg assuming a   right atrial pressure of 5 mmHg, which is consistent with mild pulmonary   hypertension.    < end of copied text >    -CATHETERIZATION: 4/21/2018  Prox LAD: 30 % stenosis  OM1 ostium: 70 % stenosis  Non-obstructive coronary artery disease.       ECG:    NSR. LVH HPI:  80 yo F w/ hx of GIB, PUD, diverticulosis, hemorrhoids, HTN, COPD, severe MR, HFpEF, hypothyroidism p/w GIB. Pt had 1 episode of "burgundy" colored liquid in toilet bowel with formed stools, then another episode at night time. Pt admits to taking 4 baby aspirin the day before for back pain. Pt admits to lightheadedness and EMS found BP of 80/50 by EMS. Pt had hx of GIB Aug 2018, EGD significant for gastritis, duodenitis, and PUD, colonoscopy showed diverticulosis, polyps, and internal/external hemorrhoids. She was to follow up with repeat colonoscopy in 3 months but never did.    In ED, hgb down to 6.4 from baseline of 10 in June 2019, rectal exam showed streak of red blood, placed on Protonix drip and given 2u PRBC (09 Aug 2019 04:41)    Cardiology note:   79 female with non-obstructive CAD, PUD, diverticulosis, severe MR, HFpEF presenting for GIB. Patient was seen many times by cardiology inpatient for evaluation of her MR, but she never followed up as outpatient. She has a poor functional status at baseline, denies chest pain, THOMAS, LE swelling, PND or orthopnea.       PAST MEDICAL & SURGICAL HISTORY  Other specified hypothyroidism  Severe mitral regurgitation  COPD (chronic obstructive pulmonary disease)  CHF (congestive heart failure)  HTN (hypertension)  History of cholecystectomy      No pertinent family history of premature CAD in first degree relatives    SOCIAL HISTORY: Denies EtOH, smoking or drug use    ALLERGIES:  No Known Allergies      MEDICATIONS  (STANDING):  atorvastatin 20 milliGRAM(s) Oral at bedtime  buDESOnide  80 MICROgram(s)/formoterol 4.5 MICROgram(s) Inhaler 2 Puff(s) Inhalation two times a day  chlorhexidine 4% Liquid 1 Application(s) Topical <User Schedule>  lactated ringers. 1000 milliLiter(s) (50 mL/Hr) IV Continuous <Continuous>  levothyroxine 25 MICROGram(s) Oral daily  pantoprazole  Injectable 40 milliGRAM(s) IV Push daily    MEDICATIONS  (PRN):  ALBUTerol/ipratropium for Nebulization 3 milliLiter(s) Nebulizer every 6 hours PRN Shortness of Breath and/or Wheezing      Home Medications:  albuterol 2.5 mg/3 mL (0.083%) inhalation solution: 3 milliliter(s) inhaled every 2 hours, As Needed (09 Aug 2019 04:36)  AMLODIPINE   TAB 5MG:  (09 Aug 2019 04:36)  aspirin 81 mg oral tablet, chewable: 1 tab(s) orally once a day (09 Aug 2019 04:36)  ATORVASTATIN TAB 20MG:  (09 Aug 2019 04:36)  Breo Ellipta 100 mcg-25 mcg/inh inhalation powder: 1 puff(s) inhaled once a day (09 Aug 2019 04:36)  ENALAPRIL    TAB 10MG:  (09 Aug 2019 04:36)  furosemide 40 mg oral tablet: 1 tab(s) orally once a day (09 Aug 2019 04:36)  LEVOTHYROXIN TAB 25MCG:  (09 Aug 2019 04:36)  metoprolol tartrate 25 mg oral tablet: 1 tab(s) orally 2 times a day (09 Aug 2019 04:36)      REVIEW OF SYSTEMS:  CONSTITUTIONAL: No fever, weight loss, fatigue  NECK: No pain or stiffness  RESPIRATORY: No cough, wheezing, shortness of breath  CARDIOVASCULAR: See HPI  GASTROINTESTINAL: See HPI  GENITOURINARY: No dysuria, frequency, hematuria, incontinence  NEUROLOGICAL: No headaches, memory loss, loss of strength, numbness, tremors  SKIN: No itching, burning, rashes, lesions   ENDOCRINE: No heat/cold intolerance or hair loss  MUSCULOSKELETAL: No joint pain or swelling  HEME/LYMPH: No easy bruising or bleeding gums    VITALS:   T(F): 97.6 (08-12 @ 08:14), Max: 98.8 (08-10 @ 12:00)  HR: 100 (08-12 @ 11:39) (60 - 100)  BP: 140/98 (08-12 @ 11:39) (108/58 - 187/90)  BP(mean): 114 (08-12 @ 11:39) (77 - 146)  RR: 23 (08-12 @ 11:39) (15 - 43)  SpO2: 100% (08-12 @ 08:14) (96% - 100%)    I&O's Summary    11 Aug 2019 07:01  -  12 Aug 2019 07:00  --------------------------------------------------------  IN: 1414 mL / OUT: 1702 mL / NET: -288 mL    12 Aug 2019 07:01  -  12 Aug 2019 12:01  --------------------------------------------------------  IN: 100 mL / OUT: 0 mL / NET: 100 mL      PHYSICAL EXAM:  General: NAD, AAOx3  HEENT: NCAT, EOMI, PERRLA  Neck: supple, no JVD  CV: RRR, S1S2 nl, 2/6 systolic murmur, no edema  Respiratory: CTA bilaterally, no wheeze, rales or rhonchi	  Abdomen: soft, NT/ND, +BS  Extremities: ROM nl, 2+ PP bilaterally  Neuro: Nonfocal                            9.0    6.48  )-----------( 185      ( 12 Aug 2019 04:42 )             27.1     08-12    142  |  105  |  6<L>  ----------------------------<  80  3.9   |  25  |  0.6<L>    Ca    8.6      12 Aug 2019 04:42  Mg     1.8     08-12    TPro  5.8<L>  /  Alb  3.4<L>  /  TBili  0.5  /  DBili  x   /  AST  20  /  ALT  11  /  AlkPhos  49  08-12        RADIOLOGY:  -CXR: no opacities or effusions  -TTE: < from: Transthoracic Echocardiogram (08.10.19 @ 10:23) >   1. LV Ejection Fraction by Burton's Method with a biplane EF of 67 %.   2. Moderately increased LV wall thickness.   3. Spectral Doppler shows impaired relaxation pattern of left   ventricular myocardial filling (Grade I diastolic dysfunction).   4. Degenerative mitral valve.   5. Moderate thickening of the anterior and posterior mitral valve   leaflets.   6. Severe mitral valve regurgitation.   7. Mild-moderate tricuspid regurgitation.   8. Sclerotic aortic valve with normal opening.   9. Estimated pulmonary artery systolic pressure is 45.6 mmHg assuming a   right atrial pressure of 5 mmHg, which is consistent with mild pulmonary   hypertension.    < end of copied text >      CATHETERIZATION: 4/21/2018  Prox LAD: 30 % stenosis  OM1 ostium: 70 % stenosis  Non-obstructive coronary artery disease      ECG: NSR. LVH

## 2019-08-12 NOTE — PROGRESS NOTE ADULT - ASSESSMENT
IMPRESSION:    Lower GIB s/p 5 PRBC   H/O HFpEF and Severe MR  H/O Diverticulosis    PLAN:    CNS: no sedatives    HEENT:  Oral care    PULMONARY:  HOB @ 45 degrees, Cont home inhalers; nebs q6 hr and PRN     CARDIOVASCULAR: I=O,  LR at 50 cc/hr     GI:  GI follow up, NPO Protonix, FOR colonoiscopy    RENAL:  F/u  lytes.  Correct as needed. accurate I/O     INFECTIOUS DISEASE: no abx    HEMATOLOGICAL:  DVT prophylaxis- SCD, serial CBC q 8 hrs;     ENDOCRINE:  Follow up FS.    MUSCULOSKELETAL: bedrest    LINES/FRANCOIS:  2- 18G peripherals    CODE STATUS: FULL CODE    DISPOSITION: MICU for now.   will follow

## 2019-08-12 NOTE — PROVIDER CONTACT NOTE (OTHER) - SITUATION
MD notified that pts BP has been elevated, VCX363/180s, pt asymptomatic, bowel prep in progress for colonoscopy in AM. Has not taken antihypertensives since admission

## 2019-08-12 NOTE — PROGRESS NOTE ADULT - SUBJECTIVE AND OBJECTIVE BOX
Hospital Day:  3d    Subjective:    Patient is a 79y old  Female who presents with a chief complaint of GIB (12 Aug 2019 12:00).  Patient received one unit last night fir Hg of 6.9. This morning patient is complaining that she wants to go home. She otherwise has no new complaints.    Past Medical Hx:   Other specified hypothyroidism  Severe mitral regurgitation  COPD (chronic obstructive pulmonary disease)  CHF (congestive heart failure)  HTN (hypertension)    Past Sx:  History of cholecystectomy  No significant past surgical history    Allergies:  No Known Allergies    Current Meds:   Standng Meds:  atorvastatin 20 milliGRAM(s) Oral at bedtime  buDESOnide  80 MICROgram(s)/formoterol 4.5 MICROgram(s) Inhaler 2 Puff(s) Inhalation two times a day  chlorhexidine 4% Liquid 1 Application(s) Topical <User Schedule>  lactated ringers. 1000 milliLiter(s) (50 mL/Hr) IV Continuous <Continuous>  levothyroxine 25 MICROGram(s) Oral daily  pantoprazole  Injectable 40 milliGRAM(s) IV Push daily    PRN Meds:  ALBUTerol/ipratropium for Nebulization 3 milliLiter(s) Nebulizer every 6 hours PRN Shortness of Breath and/or Wheezing    HOME MEDICATIONS:  albuterol 2.5 mg/3 mL (0.083%) inhalation solution: 3 milliliter(s) inhaled every 2 hours, As Needed  AMLODIPINE   TAB 5MG:   aspirin 81 mg oral tablet, chewable: 1 tab(s) orally once a day  ATORVASTATIN TAB 20MG:   Breo Ellipta 100 mcg-25 mcg/inh inhalation powder: 1 puff(s) inhaled once a day  ENALAPRIL    TAB 10MG:   furosemide 40 mg oral tablet: 1 tab(s) orally once a day  LEVOTHYROXIN TAB 25MCG:   metoprolol tartrate 25 mg oral tablet: 1 tab(s) orally 2 times a day      Vital Signs:   T(F): 97.2 (08-12-19 @ 11:39), Max: 98.1 (08-12-19 @ 00:00)  HR: 100 (08-12-19 @ 11:39) (68 - 100)  BP: 140/98 (08-12-19 @ 11:39) (140/98 - 187/90)  RR: 23 (08-12-19 @ 11:39) (18 - 43)  SpO2: 100% (08-12-19 @ 08:14) (99% - 100%)      08-11-19 @ 07:01  -  08-12-19 @ 07:00  --------------------------------------------------------  IN: 1414 mL / OUT: 1702 mL / NET: -288 mL    08-12-19 @ 07:01  -  08-12-19 @ 12:29  --------------------------------------------------------  IN: 100 mL / OUT: 0 mL / NET: 100 mL        Physical Exam:   GENERAL: NAD  HEENT: NCAT  CHEST/LUNG: CTAB  HEART: Regular rate and rhythm; s1 s2 appreciated, Loud systolic murmur at mitral and tricuspid area  ABDOMEN: Soft, Nontender, Nondistended; Bowel sounds present  EXTREMITIES: No LE edema b/l  NERVOUS SYSTEM:  Alert & Oriented X3    Labs:                         9.0    6.48  )-----------( 185      ( 12 Aug 2019 04:42 )             27.1       12 Aug 2019 04:42    142    |  105    |  6      ----------------------------<  80     3.9     |  25     |  0.6      Ca    8.6        12 Aug 2019 04:42  Mg     1.8       12 Aug 2019 04:42    TPro  5.8    /  Alb  3.4    /  TBili  0.5    /  DBili  x      /  AST  20     /  ALT  11     /  AlkPhos  49     12 Aug 2019 04:42    Troponin <0.01, CKMB --, CK -- 08-09-19 @ 11:13  Troponin <0.01, CKMB --, CK -- 08-09-19 @ 01:40      Assessment and Plan:   · Assessment      80 yo F w/ hx of GIB, PUD, diverticulosis, hemorrhoids, HTN, COPD, severe MR, HFpEF, hypothyroidism p/w GIB. Hgb on admission 6.4.      Lower GI bleed/ Acute blood loss anemia  - CCU monitoring   - CBC Q 6 hours, NPO  - Protonix IV daily   - IR following, Hgb improved today no intervention   - GI following   - Completed bowel prep at noon  - Colonoscopy this afternoon  - Per cardiology Moderate risk patient for a low risk procedure  - hold ASA  - pt received 5 units of PRBC since admission, keep Hb above 7.5, keep type and cross active      H/O COPD  -stable, Nebs PRN  - supplement oxygen, monitor pulse Ox     HTN  - c/w home medications     H/O MR  - monitor for volume overload ( pt received 4 units of PRBC )     Hypothyroid  - c/w Synthroid     DVT prophylaxis   - SCD b/l LE     Diet; NPO   Dispo; To home, pending colonoscopy  FULL CODE Hospital Day:  3d    Subjective:    Patient is a 79y old  Female who presents with a chief complaint of GIB (12 Aug 2019 12:00).  Patient received one unit last night fir Hg of 6.9. This morning patient is complaining that she wants to go home. She otherwise has no new complaints.    Past Medical Hx:   Other specified hypothyroidism  Severe mitral regurgitation  COPD (chronic obstructive pulmonary disease)  CHF (congestive heart failure)  HTN (hypertension)    Past Sx:  History of cholecystectomy  No significant past surgical history    Allergies:  No Known Allergies    Current Meds:   Standng Meds:  atorvastatin 20 milliGRAM(s) Oral at bedtime  buDESOnide  80 MICROgram(s)/formoterol 4.5 MICROgram(s) Inhaler 2 Puff(s) Inhalation two times a day  chlorhexidine 4% Liquid 1 Application(s) Topical <User Schedule>  lactated ringers. 1000 milliLiter(s) (50 mL/Hr) IV Continuous <Continuous>  levothyroxine 25 MICROGram(s) Oral daily  pantoprazole  Injectable 40 milliGRAM(s) IV Push daily    PRN Meds:  ALBUTerol/ipratropium for Nebulization 3 milliLiter(s) Nebulizer every 6 hours PRN Shortness of Breath and/or Wheezing    HOME MEDICATIONS:  albuterol 2.5 mg/3 mL (0.083%) inhalation solution: 3 milliliter(s) inhaled every 2 hours, As Needed  AMLODIPINE   TAB 5MG:   aspirin 81 mg oral tablet, chewable: 1 tab(s) orally once a day  ATORVASTATIN TAB 20MG:   Breo Ellipta 100 mcg-25 mcg/inh inhalation powder: 1 puff(s) inhaled once a day  ENALAPRIL    TAB 10MG:   furosemide 40 mg oral tablet: 1 tab(s) orally once a day  LEVOTHYROXIN TAB 25MCG:   metoprolol tartrate 25 mg oral tablet: 1 tab(s) orally 2 times a day      Vital Signs:   T(F): 97.2 (08-12-19 @ 11:39), Max: 98.1 (08-12-19 @ 00:00)  HR: 100 (08-12-19 @ 11:39) (68 - 100)  BP: 140/98 (08-12-19 @ 11:39) (140/98 - 187/90)  RR: 23 (08-12-19 @ 11:39) (18 - 43)  SpO2: 100% (08-12-19 @ 08:14) (99% - 100%)      08-11-19 @ 07:01  -  08-12-19 @ 07:00  --------------------------------------------------------  IN: 1414 mL / OUT: 1702 mL / NET: -288 mL    08-12-19 @ 07:01  -  08-12-19 @ 12:29  --------------------------------------------------------  IN: 100 mL / OUT: 0 mL / NET: 100 mL    Physical Exam:   GENERAL: NAD  HEENT: NCAT  CHEST/LUNG: CTAB  HEART: Regular rate and rhythm; s1 s2 appreciated, Loud systolic murmur at mitral and tricuspid area  ABDOMEN: Soft, Nontender, Nondistended; Bowel sounds present  EXTREMITIES: No LE edema b/l  NERVOUS SYSTEM:  Alert & Oriented X3    Labs:                         9.0    6.48  )-----------( 185      ( 12 Aug 2019 04:42 )             27.1     12 Aug 2019 04:42    142    |  105    |  6      ----------------------------<  80     3.9     |  25     |  0.6      Ca    8.6        12 Aug 2019 04:42  Mg     1.8       12 Aug 2019 04:42    TPro  5.8    /  Alb  3.4    /  TBili  0.5    /  DBili  x      /  AST  20     /  ALT  11     /  AlkPhos  49     12 Aug 2019 04:42    Troponin <0.01, CKMB --, CK -- 08-09-19 @ 11:13  Troponin <0.01, CKMB --, CK -- 08-09-19 @ 01:40      Assessment and Plan:       80 yo F w/ hx of GIB, PUD, diverticulosis, hemorrhoids, HTN, COPD, severe MR, HFpEF, hypothyroidism p/w GIB. Hgb on admission 6.4.      #Lower GI bleed/ Acute blood loss anemia  - CCU monitoring   - CBC Q 6 hours, NPO  - Protonix IV daily   - IR following, GI follwowing  - hold ASA  - pt received 5 units of PRBC since admission, keep Hb above 7.5, keep type and cross active  - Completed bowel prep at noon  - Colonoscopy this afternoon  - Per cardiology Moderate risk patient for a low risk procedure      #H/O COPD  - stable, Nebs PRN  - supplement oxygen, monitor pulse Ox     #HTN  - c/w home medications     #H/O MR  - monitor for volume overload ( pt received 4 units of PRBC )     #Hypothyroid  - c/w Synthroid     DVT prophylaxis   - SCD b/l LE     Diet; NPO   Dispo; To home, pending colonoscopy  FULL CODE

## 2019-08-12 NOTE — CHART NOTE - NSCHARTNOTEFT_GEN_A_CORE
PACU ANESTHESIA ADMISSION NOTE      Procedure:   Post op diagnosis:      ____  Intubated  TV:______       Rate: ______      FiO2: ______    __x__  Patent Airway    __x__  Full return of protective reflexes    _x___  Full recovery from anesthesia / back to baseline     Vitals:   T:           R:   12               BP:  123/58                Sat: 100                  P: 87      Mental Status:  _x___ Awake   x_____ Alert   _____ Drowsy   _____ Sedated    Nausea/Vomiting:  x____ NO  ______Yes,   See Post - Op Orders          Pain Scale (0-10):  _0____    Treatment: _x__ None    ____ See Post - Op/PCA Orders    Post - Operative Fluids:   __x__ Oral   ____ See Post - Op Orders    Plan: Discharge:   ____Home       _____Floor     __x___Critical Care    _____  Other:_________________    Comments: tolerated procedure well

## 2019-08-12 NOTE — CONSULT NOTE ADULT - ASSESSMENT
Patient is a 79y old  Female who presents with a chief complaint of GIB (12 Aug 2019 08:50)    Hypothyroidism  Severe mitral regurgitation  HFpEF   COPD (chronic obstructive pulmonary disease)  HTN (hypertension)  GI bleed    Recommendations:   Keep I=O  Strict BP control resume Metoprolol 25 mg and Amlodipine 5 mg   No CHF or angina  Moderate risk patient for a low risk procedure Patient is a 79y old  Female who presents with a chief complaint of GIB (12 Aug 2019 08:50)    Severe mitral regurgitation  HFpEF - Euvolemic, No CHF, no angina  Nonobstructive CAD  COPD (chronic obstructive pulmonary disease)  HTN (hypertension)  GI bleed  Hypothyroidism    Recommendations:  Keep I=O  Strict BP control - resume Metoprolol 25 mg and Amlodipine 5 mg if BP tolerates  Moderate-risk patient for a low risk procedure

## 2019-08-12 NOTE — PROGRESS NOTE ADULT - SUBJECTIVE AND OBJECTIVE BOX
OVERNIGHT EVENTS: all over 5 units PRBC, 1 unit of plt, for colonoscopy today    Vital Signs Last 24 Hrs  T(C): 36.4 (12 Aug 2019 04:00), Max: 36.7 (11 Aug 2019 11:00)  T(F): 97.6 (12 Aug 2019 04:00), Max: 98.1 (11 Aug 2019 12:00)  HR: 74 (12 Aug 2019 06:00) (70 - 88)  BP: 167/85 (12 Aug 2019 06:00) (139/81 - 187/90)  BP(mean): 114 (12 Aug 2019 06:00) (103 - 146)  RR: 18 (12 Aug 2019 06:00) (15 - 35)  SpO2: 100% (12 Aug 2019 06:00) (98% - 100%)    PHYSICAL EXAMINATION:    GENERAL: axox3    HEENT: Head is normocephalic and atraumatic. Extraocular muscles are intact. Mucous membranes are moist.    NECK: Supple.    LUNGS: dec bs both bases    HEART: WILMAR 4/6    ABDOMEN: Soft, nontender, and nondistended.      EXTREMITIES: Without any cyanosis, clubbing, rash, lesions or edema.    NEUROLOGIC: Grossly intact.    SKIN: No ulceration or induration present.      LABS:                        9.0    6.48  )-----------( 185      ( 12 Aug 2019 04:42 )             27.1     08-12    142  |  105  |  6<L>  ----------------------------<  80  3.9   |  25  |  0.6<L>    Ca    8.6      12 Aug 2019 04:42  Mg     1.8     08-12    TPro  5.8<L>  /  Alb  3.4<L>  /  TBili  0.5  /  DBili  x   /  AST  20  /  ALT  11  /  AlkPhos  49  08-12 08-11-19 @ 07:01  -  08-12-19 @ 07:00  --------------------------------------------------------  IN: 1414 mL / OUT: 1702 mL / NET: -288 mL        MICROBIOLOGY:      MEDICATIONS  (STANDING):  atorvastatin 20 milliGRAM(s) Oral at bedtime  buDESOnide  80 MICROgram(s)/formoterol 4.5 MICROgram(s) Inhaler 2 Puff(s) Inhalation two times a day  chlorhexidine 4% Liquid 1 Application(s) Topical <User Schedule>  lactated ringers. 1000 milliLiter(s) (50 mL/Hr) IV Continuous <Continuous>  levothyroxine 25 MICROGram(s) Oral daily  pantoprazole  Injectable 40 milliGRAM(s) IV Push daily    MEDICATIONS  (PRN):  ALBUTerol/ipratropium for Nebulization 3 milliLiter(s) Nebulizer every 6 hours PRN Shortness of Breath and/or Wheezing      RADIOLOGY & ADDITIONAL STUDIES:

## 2019-08-13 LAB
ALBUMIN SERPL ELPH-MCNC: 3.5 G/DL — SIGNIFICANT CHANGE UP (ref 3.5–5.2)
ALP SERPL-CCNC: 51 U/L — SIGNIFICANT CHANGE UP (ref 30–115)
ALT FLD-CCNC: 10 U/L — SIGNIFICANT CHANGE UP (ref 0–41)
ANION GAP SERPL CALC-SCNC: 11 MMOL/L — SIGNIFICANT CHANGE UP (ref 7–14)
AST SERPL-CCNC: 20 U/L — SIGNIFICANT CHANGE UP (ref 0–41)
BASOPHILS # BLD AUTO: 0.05 K/UL — SIGNIFICANT CHANGE UP (ref 0–0.2)
BASOPHILS NFR BLD AUTO: 0.8 % — SIGNIFICANT CHANGE UP (ref 0–1)
BILIRUB SERPL-MCNC: 0.3 MG/DL — SIGNIFICANT CHANGE UP (ref 0.2–1.2)
BUN SERPL-MCNC: 6 MG/DL — LOW (ref 10–20)
CALCIUM SERPL-MCNC: 8.4 MG/DL — LOW (ref 8.5–10.1)
CHLORIDE SERPL-SCNC: 106 MMOL/L — SIGNIFICANT CHANGE UP (ref 98–110)
CO2 SERPL-SCNC: 25 MMOL/L — SIGNIFICANT CHANGE UP (ref 17–32)
CREAT SERPL-MCNC: 0.8 MG/DL — SIGNIFICANT CHANGE UP (ref 0.7–1.5)
EOSINOPHIL # BLD AUTO: 0.35 K/UL — SIGNIFICANT CHANGE UP (ref 0–0.7)
EOSINOPHIL NFR BLD AUTO: 5.8 % — SIGNIFICANT CHANGE UP (ref 0–8)
GLUCOSE SERPL-MCNC: 141 MG/DL — HIGH (ref 70–99)
HCT VFR BLD CALC: 26.6 % — LOW (ref 37–47)
HCT VFR BLD CALC: 26.8 % — LOW (ref 37–47)
HGB BLD-MCNC: 8.6 G/DL — LOW (ref 12–16)
HGB BLD-MCNC: 8.9 G/DL — LOW (ref 12–16)
IMM GRANULOCYTES NFR BLD AUTO: 0.3 % — SIGNIFICANT CHANGE UP (ref 0.1–0.3)
LYMPHOCYTES # BLD AUTO: 1.14 K/UL — LOW (ref 1.2–3.4)
LYMPHOCYTES # BLD AUTO: 19 % — LOW (ref 20.5–51.1)
MCHC RBC-ENTMCNC: 29.7 PG — SIGNIFICANT CHANGE UP (ref 27–31)
MCHC RBC-ENTMCNC: 30.5 PG — SIGNIFICANT CHANGE UP (ref 27–31)
MCHC RBC-ENTMCNC: 32.3 G/DL — SIGNIFICANT CHANGE UP (ref 32–37)
MCHC RBC-ENTMCNC: 33.2 G/DL — SIGNIFICANT CHANGE UP (ref 32–37)
MCV RBC AUTO: 91.7 FL — SIGNIFICANT CHANGE UP (ref 81–99)
MCV RBC AUTO: 91.8 FL — SIGNIFICANT CHANGE UP (ref 81–99)
MONOCYTES # BLD AUTO: 0.65 K/UL — HIGH (ref 0.1–0.6)
MONOCYTES NFR BLD AUTO: 10.8 % — HIGH (ref 1.7–9.3)
NEUTROPHILS # BLD AUTO: 3.79 K/UL — SIGNIFICANT CHANGE UP (ref 1.4–6.5)
NEUTROPHILS NFR BLD AUTO: 63.3 % — SIGNIFICANT CHANGE UP (ref 42.2–75.2)
NRBC # BLD: 0 /100 WBCS — SIGNIFICANT CHANGE UP (ref 0–0)
NRBC # BLD: 0 /100 WBCS — SIGNIFICANT CHANGE UP (ref 0–0)
NT-PROBNP SERPL-SCNC: 279 PG/ML — SIGNIFICANT CHANGE UP (ref 0–300)
PLATELET # BLD AUTO: 181 K/UL — SIGNIFICANT CHANGE UP (ref 130–400)
PLATELET # BLD AUTO: 198 K/UL — SIGNIFICANT CHANGE UP (ref 130–400)
POTASSIUM SERPL-MCNC: 3.7 MMOL/L — SIGNIFICANT CHANGE UP (ref 3.5–5)
POTASSIUM SERPL-SCNC: 3.7 MMOL/L — SIGNIFICANT CHANGE UP (ref 3.5–5)
PROT SERPL-MCNC: 5.8 G/DL — LOW (ref 6–8)
RBC # BLD: 2.9 M/UL — LOW (ref 4.2–5.4)
RBC # BLD: 2.92 M/UL — LOW (ref 4.2–5.4)
RBC # FLD: 14.9 % — HIGH (ref 11.5–14.5)
RBC # FLD: 15 % — HIGH (ref 11.5–14.5)
SODIUM SERPL-SCNC: 142 MMOL/L — SIGNIFICANT CHANGE UP (ref 135–146)
WBC # BLD: 6 K/UL — SIGNIFICANT CHANGE UP (ref 4.8–10.8)
WBC # BLD: 6.12 K/UL — SIGNIFICANT CHANGE UP (ref 4.8–10.8)
WBC # FLD AUTO: 6 K/UL — SIGNIFICANT CHANGE UP (ref 4.8–10.8)
WBC # FLD AUTO: 6.12 K/UL — SIGNIFICANT CHANGE UP (ref 4.8–10.8)

## 2019-08-13 PROCEDURE — 71045 X-RAY EXAM CHEST 1 VIEW: CPT | Mod: 26

## 2019-08-13 RX ORDER — METOPROLOL TARTRATE 50 MG
25 TABLET ORAL
Refills: 0 | Status: DISCONTINUED | OUTPATIENT
Start: 2019-08-13 | End: 2019-08-15

## 2019-08-13 RX ADMIN — ATORVASTATIN CALCIUM 20 MILLIGRAM(S): 80 TABLET, FILM COATED ORAL at 21:39

## 2019-08-13 RX ADMIN — Medication 3 MILLILITER(S): at 20:45

## 2019-08-13 RX ADMIN — Medication 10 MILLIGRAM(S): at 18:20

## 2019-08-13 RX ADMIN — BUDESONIDE AND FORMOTEROL FUMARATE DIHYDRATE 2 PUFF(S): 160; 4.5 AEROSOL RESPIRATORY (INHALATION) at 18:20

## 2019-08-13 RX ADMIN — Medication 25 MICROGRAM(S): at 06:12

## 2019-08-13 RX ADMIN — Medication 25 MILLIGRAM(S): at 18:20

## 2019-08-13 RX ADMIN — PANTOPRAZOLE SODIUM 40 MILLIGRAM(S): 20 TABLET, DELAYED RELEASE ORAL at 18:19

## 2019-08-13 RX ADMIN — CHLORHEXIDINE GLUCONATE 1 APPLICATION(S): 213 SOLUTION TOPICAL at 06:14

## 2019-08-13 NOTE — DIETITIAN INITIAL EVALUATION ADULT. - RD TO REMAIN AVAILABLE
Nutrition Intervention: Meals & Snacks, Medical Food Supplements. Monitoring/Evaluation: Energy intake, diet order, glucose profile, renal profile, nutrition focused physical findings, body composition./yes

## 2019-08-13 NOTE — PROGRESS NOTE ADULT - SUBJECTIVE AND OBJECTIVE BOX
GI HPI Today:  Patient is a 79y old  Female who presents with a chief complaint of blood per rectum  (12 Aug 2019 12:29)  GI following the patient for lower gi bleed   Interval Events :   S/p Colonoscopy on 8/12/2019   No overt gi bleed overnight , no prbc transfusions overnight   Denies abd pain, nausea, vomiting , fever   tolerating liquid diet , passing flatus       PAST MEDICAL & SURGICAL HISTORY  Other specified hypothyroidism  Severe mitral regurgitation  COPD (chronic obstructive pulmonary disease)  CHF (congestive heart failure)  HTN (hypertension)  History of cholecystectomy      ALLERGIES:  No Known Allergies      MEDICATIONS:  MEDICATIONS  (STANDING):  atorvastatin 20 milliGRAM(s) Oral at bedtime  buDESOnide  80 MICROgram(s)/formoterol 4.5 MICROgram(s) Inhaler 2 Puff(s) Inhalation two times a day  chlorhexidine 4% Liquid 1 Application(s) Topical <User Schedule>  levothyroxine 25 MICROGram(s) Oral daily  pantoprazole  Injectable 40 milliGRAM(s) IV Push daily    MEDICATIONS  (PRN):  ALBUTerol/ipratropium for Nebulization 3 milliLiter(s) Nebulizer every 6 hours PRN Shortness of Breath and/or Wheezing      REVIEW OF SYSTEMS  General:  No fevers  Eyes:  No reported pain   ENT:  No sore throat   NECK: No stiffness   CV:  No chest pain   Resp:  No shortness of breath  GI:  See HPI  :  No dysuria  Muscle:  No weakness  Neuro:  No tingling  Endocrine:  No polyuria  Heme:  No ecchymosis        VITALS:   T(F): 97.1 (08-13 @ 04:00), Max: 98.8 (08-10 @ 12:00)  HR: 76 (08-13 @ 06:00) (68 - 100)  BP: 148/84 (08-13 @ 06:00) (108/58 - 187/90)  BP(mean): 114 (08-13 @ 06:00) (77 - 146)  RR: 18 (08-13 @ 06:00) (15 - 43)  SpO2: 100% (08-13 @ 06:00) (97% - 100%)        PHYSICAL EXAM:  Gen: comfortable   EYES: No scleral icterus   LUNG: Clear to auscultation bilaterally;  HEART: s1 and s2 heard   ABDOMEN: Soft, +BS, no abd distension , no Abdominal Tenderness, No guarding, No Baez Sign   Neuro: AAO x 3  Ext: no edema      Blood Work :                        8.9    6.00  )-----------( 198      ( 13 Aug 2019 05:05 )             26.8       08-13    142  |  106  |  6<L>  ----------------------------<  141<H>  3.7   |  25  |  0.8    Ca    8.4<L>      13 Aug 2019 05:05  Mg     1.8     08-12      CBC -  ( 13 Aug 2019 05:05 )  Hemoglobin : 8.9    CBC -  ( 13 Aug 2019 00:52 )  Hemoglobin : 8.6    CBC -  ( 12 Aug 2019 20:32 )  Hemoglobin : 9.7    CBC -  ( 12 Aug 2019 04:42 )  Hemoglobin : 9.0    CBC -  ( 12 Aug 2019 00:08 )  Hemoglobin : 9.5    CBC -  ( 11 Aug 2019 11:59 )  Hemoglobin : 9.6    CBC -  ( 10 Aug 2019 23:48 )  Hemoglobin : 6.9    CBC -  ( 10 Aug 2019 21:01 )  Hemoglobin : 7.3    CBC -  ( 10 Aug 2019 17:36 )  Hemoglobin : 8.2    CBC -  ( 10 Aug 2019 13:13 )  Hemoglobin : 8.3    CBC -  ( 10 Aug 2019 00:57 )  Hemoglobin : 6.7    CBC -  ( 09 Aug 2019 16:26 )  Hemoglobin : 8.8    CBC -  ( 09 Aug 2019 11:13 )  Hemoglobin : 8.7      LIVER FUNCTIONS - ( 13 Aug 2019 05:05 )  Alb: 3.5 [3.5 - 5.2] / Pro: 5.8 [6.0 - 8.0] / ALK PHOS: 51 [30 - 115] / ALT: 10 [0 - 41] / AST: 20 [0 - 41] / GGT: x     LIVER FUNCTIONS - ( 12 Aug 2019 04:42 )  Alb: 3.4 [3.5 - 5.2] / Pro: 5.8 [6.0 - 8.0] / ALK PHOS: 49 [30 - 115] / ALT: 11 [0 - 41] / AST: 20 [0 - 41] / GGT: x     LIVER FUNCTIONS - ( 11 Aug 2019 11:59 )  Alb: 3.5 [3.5 - 5.2] / Pro: 6.1 [6.0 - 8.0] / ALK PHOS: 46 [30 - 115] / ALT: 11 [0 - 41] / AST: 26 [0 - 41] / GGT: x     LIVER FUNCTIONS - ( 09 Aug 2019 11:13 )  Alb: 3.2 [3.5 - 5.2] / Pro: 6.0 [6.0 - 8.0] / ALK PHOS: 53 [30 - 115] / ALT: 12 [0 - 41] / AST: 15 [0 - 41] / GGT: x     LIVER FUNCTIONS - ( 09 Aug 2019 00:30 )  Alb: 3.1 [3.5 - 5.2] / Pro: 6.3 [6.0 - 8.0] / ALK PHOS: 46 [30 - 115] / ALT: 15 [0 - 41] / AST: 40 [0 - 41] / GGT: x         RADIOLOGY:    < from: Xray Chest 1 View- PORTABLE-Routine (08.12.19 @ 05:35) >    Impression:      No radiographic evidence of acute cardiopulmonary disease.    < end of copied text >    < from: CT Angio Abdomen and Pelvis w/ IV Cont (08.10.19 @ 03:34) >  FINDINGS:    LOWER CHEST: Bibasilar scarring.    HEPATOBILIARY: Cholelithiasis. Liver unremarkable. No biliary dilation.    SPLEEN: Unremarkable.    PANCREAS: Unremarkable.    ADRENAL GLANDS: Unremarkable.    KIDNEYS: Symmetric enhancement bilaterally. No evidence of   hydronephrosis. Subcentimeter right renal hypodensities, too small to   characterize. Nonobstructive bilateral renal calculi.    ABDOMINOPELVIC NODES: Unremarkable.    PELVIC ORGANS: Calcified uterine fibroids. Urinary bladder unremarkable.    PERITONEUM/MESENTERY/BOWEL: Intraluminal contrast extravasation seen on   arterial phase which enlarges on the venous phase at the ascending colon   (axial series 6, image [id number]). Colonic diverticulosis. No intraperitoneal   free air. Normal caliber appendix.    BONES/SOFT TISSUES: Degenerative changes to the thoracolumbar spine.    VASCULAR: Aorta is normal in caliber.      IMPRESSION:    Intraluminal contrast extravasation at the ascending colon, most   consistent with active gastrointestinal hemorrhage.    < end of copied text >

## 2019-08-13 NOTE — DIETITIAN INITIAL EVALUATION ADULT. - OTHER INFO
Pertinent Medical Information: p/w c/o GIB. Lower GI bleed/ Acute blood loss anemia; s/p Colonoscopy on 8/12/2019: no active bleeds, bleeding believed to be due to diverticulosis. Now on clear liquids now, advance diet as tolerated.    Pertinent Subjective Information: Decreased appetite & po intake over past few days PTP d/t abd discomfort. NKFA. No supplements. No dietary restriction with limited knowledge of nutrition concepts demonstrated. No known history of unintentional wt loss.

## 2019-08-13 NOTE — DIETITIAN INITIAL EVALUATION ADULT. - ADD RECOMMEND
Recommendation: RD to monitor diet advance. If diet advanced to solids, please add DASH/TLC diet modifier. If pt to remain on clear liquid diet, add Ensure Clear q24hrs. Check HgbA1C.

## 2019-08-13 NOTE — PROGRESS NOTE ADULT - SUBJECTIVE AND OBJECTIVE BOX
Hospital Day:  4d    Subjective:    Patient is a 79y old  Female who presents with a chief complaint of GIB (13 Aug 2019 09:57)  Patient seen and examined. She reports no new complaints; she denies bleeding, nausea, abdominal pain, chest pain. She has not had a bowel movement since last night.  Colonoscopy 8/12 showed no active bleeding; GI believes bleed was due to diverticulosis.    Past Medical Hx:   Other specified hypothyroidism  Severe mitral regurgitation  COPD (chronic obstructive pulmonary disease)  CHF (congestive heart failure)  HTN (hypertension)    Past Sx:  History of cholecystectomy  No significant past surgical history    Allergies:  No Known Allergies    Current Meds:   Standng Meds:  atorvastatin 20 milliGRAM(s) Oral at bedtime  buDESOnide  80 MICROgram(s)/formoterol 4.5 MICROgram(s) Inhaler 2 Puff(s) Inhalation two times a day  chlorhexidine 4% Liquid 1 Application(s) Topical <User Schedule>  levothyroxine 25 MICROGram(s) Oral daily  pantoprazole  Injectable 40 milliGRAM(s) IV Push daily    PRN Meds:  ALBUTerol/ipratropium for Nebulization 3 milliLiter(s) Nebulizer every 6 hours PRN Shortness of Breath and/or Wheezing    HOME MEDICATIONS:  albuterol 2.5 mg/3 mL (0.083%) inhalation solution: 3 milliliter(s) inhaled every 2 hours, As Needed  AMLODIPINE   TAB 5MG:   aspirin 81 mg oral tablet, chewable: 1 tab(s) orally once a day  ATORVASTATIN TAB 20MG:   Breo Ellipta 100 mcg-25 mcg/inh inhalation powder: 1 puff(s) inhaled once a day  ENALAPRIL    TAB 10MG:   furosemide 40 mg oral tablet: 1 tab(s) orally once a day  LEVOTHYROXIN TAB 25MCG:   metoprolol tartrate 25 mg oral tablet: 1 tab(s) orally 2 times a day      Vital Signs:   T(F): 97.1 (08-13-19 @ 04:00), Max: 98.2 (08-12-19 @ 16:00)  HR: 76 (08-13-19 @ 06:00) (72 - 100)  BP: 148/84 (08-13-19 @ 06:00) (112/59 - 171/85)  RR: 18 (08-13-19 @ 06:00) (15 - 27)  SpO2: 100% (08-13-19 @ 06:00) (99% - 100%)      08-12-19 @ 07:01  -  08-13-19 @ 07:00  --------------------------------------------------------  IN: 710 mL / OUT: 0 mL / NET: 710 mL        Physical Exam:   GENERAL: NAD  CHEST/LUNG: CTAB  HEART: Regular rate and rhythm; s1 s2 appreciated, loud murmurs in tricuspid and mitral areas.  ABDOMEN: Soft, Nontender, Nondistended; Bowel sounds present  EXTREMITIES: No LE edema b/l  NERVOUS SYSTEM:  Alert & Oriented X3    Labs:                         8.9    6.00  )-----------( 198      ( 13 Aug 2019 05:05 )             26.8     Neutophil% 63.3, Lymphocyte% 19.0, Monocyte% 10.8, Bands% 0.3 08-13-19 @ 05:05    13 Aug 2019 05:05    142    |  106    |  6      ----------------------------<  141    3.7     |  25     |  0.8      Ca    8.4        13 Aug 2019 05:05  Mg     1.8       12 Aug 2019 04:42    TPro  5.8    /  Alb  3.5    /  TBili  0.3    /  DBili  x      /  AST  20     /  ALT  10     /  AlkPhos  51     13 Aug 2019 05:05    Serum Pro-Brain Natriuretic Peptide: 279 pg/mL (08-13-19 @ 05:05)    Troponin <0.01, CKMB --, CK -- 08-09-19 @ 11:13    Assessment and Plan:       78 yo F w/ hx of GIB, PUD, diverticulosis, hemorrhoids, HTN, COPD, severe MR, HFpEF, hypothyroidism p/w GIB. Hgb on admission 6.4.      #Lower GI bleed/ Acute blood loss anemia  - CCU monitoring   - CBC Q 6 hours, NPO  - Protonix IV daily   - IR following, GI follwowing  - hold ASA  - pt received 5 units of PRBC since admission, keep Hb above 7.5, keep type and cross active  - Completed bowel prep at noon  - Colonoscopy this afternoon  - Per cardiology Moderate risk patient for a low risk procedure      #H/O COPD  - stable, Nebs PRN  - supplement oxygen, monitor pulse Ox     #HTN  - c/w home medications     #H/O MR  - monitor for volume overload (pt received total 5 units of PRBC)     #Hypothyroid  - c/w Synthroid     DVT prophylaxis   - SCD b/l LE     Diet; clear liquids   Dispo; To home  FULL CODE Hospital Day:  4d    Subjective:    Patient is a 79y old  Female who presents with a chief complaint of GIB (13 Aug 2019 09:57)  Patient seen and examined. She reports no new complaints; she denies bleeding, nausea, abdominal pain, chest pain. She has not had a bowel movement since last night.  Colonoscopy 8/12 showed no active bleeding; GI believes bleed was due to diverticulosis.    Past Medical Hx:   Other specified hypothyroidism  Severe mitral regurgitation  COPD (chronic obstructive pulmonary disease)  CHF (congestive heart failure)  HTN (hypertension)    Past Sx:  History of cholecystectomy  No significant past surgical history    Allergies:  No Known Allergies    Current Meds:   Standng Meds:  atorvastatin 20 milliGRAM(s) Oral at bedtime  buDESOnide  80 MICROgram(s)/formoterol 4.5 MICROgram(s) Inhaler 2 Puff(s) Inhalation two times a day  chlorhexidine 4% Liquid 1 Application(s) Topical <User Schedule>  levothyroxine 25 MICROGram(s) Oral daily  pantoprazole  Injectable 40 milliGRAM(s) IV Push daily    PRN Meds:  ALBUTerol/ipratropium for Nebulization 3 milliLiter(s) Nebulizer every 6 hours PRN Shortness of Breath and/or Wheezing    HOME MEDICATIONS:  albuterol 2.5 mg/3 mL (0.083%) inhalation solution: 3 milliliter(s) inhaled every 2 hours, As Needed  AMLODIPINE   TAB 5MG:   aspirin 81 mg oral tablet, chewable: 1 tab(s) orally once a day  ATORVASTATIN TAB 20MG:   Breo Ellipta 100 mcg-25 mcg/inh inhalation powder: 1 puff(s) inhaled once a day  ENALAPRIL    TAB 10MG:   furosemide 40 mg oral tablet: 1 tab(s) orally once a day  LEVOTHYROXIN TAB 25MCG:   metoprolol tartrate 25 mg oral tablet: 1 tab(s) orally 2 times a day      Vital Signs:   T(F): 97.1 (08-13-19 @ 04:00), Max: 98.2 (08-12-19 @ 16:00)  HR: 76 (08-13-19 @ 06:00) (72 - 100)  BP: 148/84 (08-13-19 @ 06:00) (112/59 - 171/85)  RR: 18 (08-13-19 @ 06:00) (15 - 27)  SpO2: 100% (08-13-19 @ 06:00) (99% - 100%)      08-12-19 @ 07:01  -  08-13-19 @ 07:00  --------------------------------------------------------  IN: 710 mL / OUT: 0 mL / NET: 710 mL        Physical Exam:   GENERAL: NAD  CHEST/LUNG: CTAB  HEART: Regular rate and rhythm; s1 s2 appreciated, loud murmurs in tricuspid and mitral areas.  ABDOMEN: Soft, Nontender, Nondistended; Bowel sounds present  EXTREMITIES: No LE edema b/l  NERVOUS SYSTEM:  Alert & Oriented X3    Labs:                         8.9    6.00  )-----------( 198      ( 13 Aug 2019 05:05 )             26.8     Neutophil% 63.3, Lymphocyte% 19.0, Monocyte% 10.8, Bands% 0.3 08-13-19 @ 05:05    13 Aug 2019 05:05    142    |  106    |  6      ----------------------------<  141    3.7     |  25     |  0.8      Ca    8.4        13 Aug 2019 05:05  Mg     1.8       12 Aug 2019 04:42    TPro  5.8    /  Alb  3.5    /  TBili  0.3    /  DBili  x      /  AST  20     /  ALT  10     /  AlkPhos  51     13 Aug 2019 05:05    Serum Pro-Brain Natriuretic Peptide: 279 pg/mL (08-13-19 @ 05:05)    Troponin <0.01, CKMB --, CK -- 08-09-19 @ 11:13    Assessment and Plan:       80 yo F w/ hx of GIB, PUD, diverticulosis, hemorrhoids, HTN, COPD, severe MR, HFpEF, hypothyroidism p/w GIB. Hgb on admission 6.4.  S/p Colonoscopy on 8/12/2019 - suboptimal prep, no blood noted in colon, severe diverticulosis in AC, TC , 3 polyps removed by hot snare , small hemorrhoids       #Lower GI bleed/ Acute blood loss anemia  - CCU monitoring   - CBC Q 6 hours, NPO  - Protonix IV daily   - IR following, GI following  - hold ASA  - pt received 5 units of PRBC since admission, keep Hb above 7.5, keep type and cross active  - S/p Colonoscopy on 8/12/2019: no active bleeds, bleeding believed to be due to diverticulosis   - Due to suboptimal prep, recommend repeat colonoscopy in 6 months  - To be downgraded to floor  - On clear liquids now, advance diet as tolerated    #H/O COPD  - stable, Nebs PRN  - supplement oxygen, monitor pulse Ox     #HTN  - continue home meds lopressor and enalapril; amlodipine and lasix held due to stable BP    #H/O MR  - monitor for volume overload (pt received total 5 units of PRBC)     #Hypothyroid  - c/w Synthroid     DVT prophylaxis   - SCD b/l LE     Diet; clear liquids   Dispo; To home  FULL CODE

## 2019-08-13 NOTE — PROGRESS NOTE ADULT - ASSESSMENT
IMPRESSION:    Lower GIB s/p 5 PRBC s/p colonoscopy  H/O HFpEF and Severe MR  H/O Diverticulosis    PLAN:    CNS: no sedatives    HEENT:  Oral care    PULMONARY:  HOB @ 45 degrees, Cont home inhalers; nebs q6 hr and PRN     CARDIOVASCULAR: I=O,  dc ivf    GI:  GI follow up, advance diet    RENAL:  F/u  lytes.  Correct as needed. accurate I/O     INFECTIOUS DISEASE: no abx    HEMATOLOGICAL:  DVT prophylaxis- SCD,     ENDOCRINE:  Follow up FS.    MUSCULOSKELETAL: bedrest    LINES/FRANCOIS:  2- 18G peripherals    CODE STATUS: FULL CODE    downgrade to floor

## 2019-08-13 NOTE — DIETITIAN INITIAL EVALUATION ADULT. - PHYSICAL APPEARANCE
BMI: 27.6 (72.9 kg). Last BM 8/12 (loose d/t colonoscopy prep). Alert. No chewing/swallowing difficulty reported. 1+ edema (B/L leg). Skin: intact

## 2019-08-13 NOTE — DIETITIAN INITIAL EVALUATION ADULT. - ENERGY NEEDS
Energy: 0119-8347 kcal/day (MSJx1.2-1.3 AF)    Protein: 73-87 g/day (1-1.2 g/kg ABW)    Fluids: 1 mL/kcal

## 2019-08-13 NOTE — CHART NOTE - NSCHARTNOTEFT_GEN_A_CORE
HPI:  78 yo F w/ hx of GIB, PUD, diverticulosis, hemorrhoids, HTN, COPD, severe MR, HFpEF, hypothyroidism p/w GIB. Pt had 1 episode of "burgundy" colored liquid in toilet bowel with formed stools, then another episode at night time. Pt admits to taking 4 baby aspirin the day before for back pain. Pt admits to lightheadedness and EMS found BP of 80/50 by EMS. Pt had hx of GIB Aug 2018, EGD significant for gastritis, duodenitis, and PUD, colonoscopy showed diverticulosis, polyps, and internal/external hemorrhoids. She was to follow up with repeat colonoscopy in 3 months but never did.    In ED, hgb down to 6.4 from baseline of 10 in June 2019, rectal exam showed streak of red blood, placed on Protonix drip and given 2u PRBC.  On the floors she began bleeding again, Hg went to 6.9. She received 2 additional units and was transferred to CCU for monitoring.    Physical Exam:   GENERAL: NAD  CHEST/LUNG: CTAB  HEART: Regular rate and rhythm; s1 s2 appreciated, loud murmurs in tricuspid and mitral areas.  ABDOMEN: Soft, Nontender, Nondistended; Bowel sounds present  EXTREMITIES: No LE edema b/l  NERVOUS SYSTEM:  Alert & Oriented X3    CCU course:  She received one more unit for low Hg.  Her colonoscopy on 8/12 showed: no active bleeds; bleeding was believed to be due to diverticulosis. They also removed 3 polyps.    Assessment and Plan: 	  78 yo F w/ hx of GIB, PUD, diverticulosis, hemorrhoids, HTN, COPD, severe MR, HFpEF, hypothyroidism p/w GIB. Hgb on admission 6.4.  S/p Colonoscopy on 8/12/2019 - suboptimal prep, no blood noted in colon, severe diverticulosis in AC, TC , 3 polyps removed by hot snare , small hemorrhoids     #Lower GI bleed/ Acute blood loss anemia  - CBC Q 12 hours  - Protonix IV daily   - IR following, GI following  - hold ASA  - pt received 5 units of PRBC since admission, keep Hb above 7.5, keep type and cross active  - S/p Colonoscopy on 8/12/2019: no active bleeds, bleeding believed to be due to diverticulosis   - Due to suboptimal prep, recommend repeat colonoscopy in 6 months  - On clear liquids now, advance diet as tolerated    #H/O COPD  - stable, Nebs PRN  - supplement oxygen, monitor pulse Ox     #HTN  - continue home meds lopressor and enalapril; amlodipine and lasix held due to stable BP    #H/O MR  - monitor for volume overload (pt received total 5 units of PRBC)     #Hypothyroid  - c/w Synthroid     DVT prophylaxis   - SCD b/l LE     Diet; clear liquids

## 2019-08-13 NOTE — PROGRESS NOTE ADULT - ASSESSMENT
78 yo F presents with maroon colored stool, Hgb on admission 6.4.   Hx of LGIB likely diverticular in the etiology 1 year ago, HTN, COPD, severe MR, HFpEF, hypothyroidism   CTA abdomen showed active bleeding in Ascending colon , no IR intervention was done   Received multiple prbc transfusions  S/p Colonoscopy on 8/12/2019 - suboptimal prep, no blood noted in colon , severe diverticulosis in AC, TC , 3 polyps removed by hot snare , small hemorrhoids     #) Lower GI bleed - resolved   Likely sec to right sided diverticulosis   Vital signs and Hb stable   Rec   Advance diet as tolerated  to high fibre diet   MOnitor cbc and transfuse as needed   Avoid constipation      #) COlon polyps   Await pathology results   Given suboptimal prep rec repeat colonoscopy as an outpatient in 6 months   [facility]     Attending rec to follow 78 yo F presents with maroon colored stool, Hgb on admission 6.4.   Hx of LGIB likely diverticular in the etiology 1 year ago, HTN, COPD, severe MR, HFpEF, hypothyroidism   CTA abdomen showed active bleeding in Ascending colon , no IR intervention was done   Received multiple prbc transfusions  S/p Colonoscopy on 8/12/2019 - suboptimal prep, no blood noted in colon , severe diverticulosis in AC, TC , 3 polyps removed by hot snare , small hemorrhoids     #) Lower GI bleed - resolved   Likely sec to right sided diverticulosis   Vital signs and Hb stable   Rec   Advance diet as tolerated  to high fibre diet   MOnitor cbc and transfuse as needed   Avoid constipation      #) COlon polyps   Await pathology results   Given suboptimal prep rec repeat colonoscopy as an outpatient in 6 months   follow [facility]     Recall GI as needed

## 2019-08-13 NOTE — PROGRESS NOTE ADULT - SUBJECTIVE AND OBJECTIVE BOX
OVERNIGHT EVENTS: s/p colonoscopy, no active bleed, clear liquid    Vital Signs Last 24 Hrs  T(C): 36.2 (13 Aug 2019 04:00), Max: 36.8 (12 Aug 2019 16:00)  T(F): 97.1 (13 Aug 2019 04:00), Max: 98.2 (12 Aug 2019 16:00)  HR: 76 (13 Aug 2019 06:00) (72 - 100)  BP: 148/84 (13 Aug 2019 06:00) (112/59 - 171/85)  BP(mean): 114 (13 Aug 2019 06:00) (80 - 121)  RR: 18 (13 Aug 2019 06:00) (15 - 43)  SpO2: 100% (13 Aug 2019 06:00) (99% - 100%)    PHYSICAL EXAMINATION:    GENERAL: The patient is awake and alert in no apparent distress.     HEENT: Head is normocephalic and atraumatic. Extraocular muscles are intact. Mucous membranes are moist.    NECK: Supple.    LUNGS: Clear to auscultation without wheezing, rales or rhonchi; respirations unlabored    HEART: Regular rate and rhythm without murmur.    ABDOMEN: Soft, nontender, and nondistended.      EXTREMITIES: Without any cyanosis, clubbing, rash, lesions or edema.    NEUROLOGIC: Grossly intact.    SKIN: No ulceration or induration present.      LABS:                        8.9    6.00  )-----------( 198      ( 13 Aug 2019 05:05 )             26.8     08-13    142  |  106  |  6<L>  ----------------------------<  141<H>  3.7   |  25  |  0.8    Ca    8.4<L>      13 Aug 2019 05:05  Mg     1.8     08-12    TPro  5.8<L>  /  Alb  3.5  /  TBili  0.3  /  DBili  x   /  AST  20  /  ALT  10  /  AlkPhos  51  08-13                Serum Pro-Brain Natriuretic Peptide: 279 pg/mL (08-13-19 @ 05:05)            08-12-19 @ 07:01  -  08-13-19 @ 07:00  --------------------------------------------------------  IN: 710 mL / OUT: 0 mL / NET: 710 mL        MICROBIOLOGY:      MEDICATIONS  (STANDING):  atorvastatin 20 milliGRAM(s) Oral at bedtime  buDESOnide  80 MICROgram(s)/formoterol 4.5 MICROgram(s) Inhaler 2 Puff(s) Inhalation two times a day  chlorhexidine 4% Liquid 1 Application(s) Topical <User Schedule>  levothyroxine 25 MICROGram(s) Oral daily  pantoprazole  Injectable 40 milliGRAM(s) IV Push daily    MEDICATIONS  (PRN):  ALBUTerol/ipratropium for Nebulization 3 milliLiter(s) Nebulizer every 6 hours PRN Shortness of Breath and/or Wheezing      RADIOLOGY & ADDITIONAL STUDIES:

## 2019-08-14 LAB
ALBUMIN SERPL ELPH-MCNC: 3.3 G/DL — LOW (ref 3.5–5.2)
ALP SERPL-CCNC: 52 U/L — SIGNIFICANT CHANGE UP (ref 30–115)
ALT FLD-CCNC: 12 U/L — SIGNIFICANT CHANGE UP (ref 0–41)
ANION GAP SERPL CALC-SCNC: 11 MMOL/L — SIGNIFICANT CHANGE UP (ref 7–14)
AST SERPL-CCNC: 17 U/L — SIGNIFICANT CHANGE UP (ref 0–41)
BASOPHILS # BLD AUTO: 0.06 K/UL — SIGNIFICANT CHANGE UP (ref 0–0.2)
BASOPHILS NFR BLD AUTO: 0.9 % — SIGNIFICANT CHANGE UP (ref 0–1)
BILIRUB SERPL-MCNC: 0.2 MG/DL — SIGNIFICANT CHANGE UP (ref 0.2–1.2)
BUN SERPL-MCNC: 6 MG/DL — LOW (ref 10–20)
CALCIUM SERPL-MCNC: 8.4 MG/DL — LOW (ref 8.5–10.1)
CHLORIDE SERPL-SCNC: 109 MMOL/L — SIGNIFICANT CHANGE UP (ref 98–110)
CO2 SERPL-SCNC: 24 MMOL/L — SIGNIFICANT CHANGE UP (ref 17–32)
CREAT SERPL-MCNC: 0.8 MG/DL — SIGNIFICANT CHANGE UP (ref 0.7–1.5)
EOSINOPHIL # BLD AUTO: 0.33 K/UL — SIGNIFICANT CHANGE UP (ref 0–0.7)
EOSINOPHIL NFR BLD AUTO: 5 % — SIGNIFICANT CHANGE UP (ref 0–8)
GLUCOSE SERPL-MCNC: 102 MG/DL — HIGH (ref 70–99)
HCT VFR BLD CALC: 26 % — LOW (ref 37–47)
HGB BLD-MCNC: 8.3 G/DL — LOW (ref 12–16)
IMM GRANULOCYTES NFR BLD AUTO: 0.5 % — HIGH (ref 0.1–0.3)
LYMPHOCYTES # BLD AUTO: 1.41 K/UL — SIGNIFICANT CHANGE UP (ref 1.2–3.4)
LYMPHOCYTES # BLD AUTO: 21.6 % — SIGNIFICANT CHANGE UP (ref 20.5–51.1)
MAGNESIUM SERPL-MCNC: 2.1 MG/DL — SIGNIFICANT CHANGE UP (ref 1.8–2.4)
MCHC RBC-ENTMCNC: 30.1 PG — SIGNIFICANT CHANGE UP (ref 27–31)
MCHC RBC-ENTMCNC: 31.9 G/DL — LOW (ref 32–37)
MCV RBC AUTO: 94.2 FL — SIGNIFICANT CHANGE UP (ref 81–99)
MONOCYTES # BLD AUTO: 0.66 K/UL — HIGH (ref 0.1–0.6)
MONOCYTES NFR BLD AUTO: 10.1 % — HIGH (ref 1.7–9.3)
NEUTROPHILS # BLD AUTO: 4.05 K/UL — SIGNIFICANT CHANGE UP (ref 1.4–6.5)
NEUTROPHILS NFR BLD AUTO: 61.9 % — SIGNIFICANT CHANGE UP (ref 42.2–75.2)
NRBC # BLD: 0 /100 WBCS — SIGNIFICANT CHANGE UP (ref 0–0)
PLATELET # BLD AUTO: 207 K/UL — SIGNIFICANT CHANGE UP (ref 130–400)
POTASSIUM SERPL-MCNC: 4 MMOL/L — SIGNIFICANT CHANGE UP (ref 3.5–5)
POTASSIUM SERPL-SCNC: 4 MMOL/L — SIGNIFICANT CHANGE UP (ref 3.5–5)
PROT SERPL-MCNC: 5.5 G/DL — LOW (ref 6–8)
RBC # BLD: 2.76 M/UL — LOW (ref 4.2–5.4)
RBC # FLD: 15.1 % — HIGH (ref 11.5–14.5)
SODIUM SERPL-SCNC: 144 MMOL/L — SIGNIFICANT CHANGE UP (ref 135–146)
SURGICAL PATHOLOGY STUDY: SIGNIFICANT CHANGE UP
WBC # BLD: 6.54 K/UL — SIGNIFICANT CHANGE UP (ref 4.8–10.8)
WBC # FLD AUTO: 6.54 K/UL — SIGNIFICANT CHANGE UP (ref 4.8–10.8)

## 2019-08-14 PROCEDURE — 99233 SBSQ HOSP IP/OBS HIGH 50: CPT

## 2019-08-14 RX ORDER — FERROUS SULFATE 325(65) MG
325 TABLET ORAL DAILY
Refills: 0 | Status: DISCONTINUED | OUTPATIENT
Start: 2019-08-14 | End: 2019-08-15

## 2019-08-14 RX ADMIN — PANTOPRAZOLE SODIUM 40 MILLIGRAM(S): 20 TABLET, DELAYED RELEASE ORAL at 12:34

## 2019-08-14 RX ADMIN — BUDESONIDE AND FORMOTEROL FUMARATE DIHYDRATE 2 PUFF(S): 160; 4.5 AEROSOL RESPIRATORY (INHALATION) at 08:32

## 2019-08-14 RX ADMIN — Medication 10 MILLIGRAM(S): at 05:24

## 2019-08-14 RX ADMIN — ATORVASTATIN CALCIUM 20 MILLIGRAM(S): 80 TABLET, FILM COATED ORAL at 21:13

## 2019-08-14 RX ADMIN — Medication 3 MILLILITER(S): at 20:07

## 2019-08-14 RX ADMIN — Medication 3 MILLILITER(S): at 14:10

## 2019-08-14 RX ADMIN — Medication 25 MICROGRAM(S): at 05:24

## 2019-08-14 RX ADMIN — BUDESONIDE AND FORMOTEROL FUMARATE DIHYDRATE 2 PUFF(S): 160; 4.5 AEROSOL RESPIRATORY (INHALATION) at 21:12

## 2019-08-14 RX ADMIN — CHLORHEXIDINE GLUCONATE 1 APPLICATION(S): 213 SOLUTION TOPICAL at 05:24

## 2019-08-14 RX ADMIN — Medication 25 MILLIGRAM(S): at 17:31

## 2019-08-14 NOTE — PROGRESS NOTE ADULT - SUBJECTIVE AND OBJECTIVE BOX
SCOTTY HANNON  79y  Female      Patient is a 79y old  Female who presents with a chief complaint of bright red blood per rectum (14 Aug 2019 12:59)      INTERVAL HPI/OVERNIGHT EVENTS:      ******************************* REVIEW OF SYSTEMS:**********************************************    resting in bed , comfortable.   All other review of systems negative    *********************** VITALS ******************************************    T(F): 98 (08-14-19 @ 13:25)  HR: 79 (08-14-19 @ 13:25) (62 - 79)  BP: 142/66 (08-14-19 @ 13:25) (110/55 - 142/66)  RR: 18 (08-14-19 @ 13:25) (18 - 18)  SpO2: 99% (08-14-19 @ 09:04) (99% - 99%)            ******************************** PHYSICAL EXAM:**************************************************  GENERAL: NAD    PSYCH: no agitation, baseline mentation  HEENT:     NERVOUS SYSTEM:  Alert & Oriented X3, MS  5/5 B/L  UE and LE ; Sensory intact    PULMONARY: ALEXUS, CTA    CARDIOVASCULAR: S1S2 RRR    GI: Soft, NT, ND; BS present.    EXTREMITIES:  2+ Peripheral Pulses, No clubbing, cyanosis, or edema    LYMPH: No lymphadenopathy noted    SKIN: No rashes or lesions    ******************************************************************************************    Consultant(s) Notes Reviewed:  [x ] YES  [ ] NO    Discussed with Consultants/Other Providers [ x] YES     **************************** LABS *******************************************************                          8.3    6.54  )-----------( 207      ( 14 Aug 2019 05:32 )             26.0     08-14    144  |  109  |  6<L>  ----------------------------<  102<H>  4.0   |  24  |  0.8    Ca    8.4<L>      14 Aug 2019 05:32  Mg     2.1     08-14    TPro  5.5<L>  /  Alb  3.3<L>  /  TBili  0.2  /  DBili  x   /  AST  17  /  ALT  12  /  AlkPhos  52  08-14          Lactate Trend        CAPILLARY BLOOD GLUCOSE              **************************Active Medications *******************************************  No Known Allergies      ALBUTerol/ipratropium for Nebulization 3 milliLiter(s) Nebulizer every 6 hours PRN  atorvastatin 20 milliGRAM(s) Oral at bedtime  buDESOnide  80 MICROgram(s)/formoterol 4.5 MICROgram(s) Inhaler 2 Puff(s) Inhalation two times a day  chlorhexidine 4% Liquid 1 Application(s) Topical <User Schedule>  enalapril 10 milliGRAM(s) Oral daily  levothyroxine 25 MICROGram(s) Oral daily  metoprolol tartrate 25 milliGRAM(s) Oral two times a day  pantoprazole  Injectable 40 milliGRAM(s) IV Push daily      ***************************************************  RADIOLOGY & ADDITIONAL TESTS:    Imaging Personally Reviewed:  [ ] YES  [ ] NO    HEALTH ISSUES - PROBLEM Dx:

## 2019-08-14 NOTE — PROGRESS NOTE ADULT - ASSESSMENT
79 year old Female with history of GIB, PUD, diverticulosis, hemorrhoids, HTN, COPD, severe MR, HFpEF, hypothyroidism p/w GIB. Hgb on admission 6.4.  S/p Colonoscopy on 8/12/2019 - suboptimal prep, no blood noted in colon, severe diverticulosis in AC, TC , 3 polyps removed by hot snare , small hemorrhoids       #Lower GI bleed/ Acute blood loss anemia probably 2/2 diverticulosis  - The patient denied any new bloody bowel movement.  -Hb today was 8.3 we will monitor for tomorrow  - Protonix IV daily   - hold ASA  - pt received 5 units of PRBC since admission, keep Hb above 7.5, keep type and cross active  - S/p Colonoscopy on 8/12/2019: no active bleeds, bleeding believed to be due to diverticulosis.  - Due to suboptimal prep, recommend repeat colonoscopy in 6 months  -  started on DASH Diet  >> tolerating well so far.     History of COPD  - stable, Nebs PRN  - supplement oxygen, monitor pulse Ox     HTN  - continue home meds lopressor and enalapril; amlodipine and lasix held due to stable BP      Hypothyroid  - c/w Synthroid     DVT prophylaxis   - SCD b/l LE        Dispo; To home  FULL CODE      #Progress Note Handoff  Pending (specify):  Stable h/h  Family discussion: na, d/w the patient at bedside.   Disposition: Home_

## 2019-08-14 NOTE — PROGRESS NOTE ADULT - SUBJECTIVE AND OBJECTIVE BOX
24H events:    Patient is a 79y old Female who presents with a chief complaint of GIB (13 Aug 2019 09:57)    Primary diagnosis of Rectal bleeding     Today is hospital day 5d.     PAST MEDICAL & SURGICAL HISTORY  Other specified hypothyroidism  Severe mitral regurgitation  COPD (chronic obstructive pulmonary disease)  CHF (congestive heart failure)  HTN (hypertension)  History of cholecystectomy    SOCIAL HISTORY:  Negative for smoking/alcohol/drug use.     ALLERGIES:  No Known Allergies    MEDICATIONS:  STANDING MEDICATIONS  atorvastatin 20 milliGRAM(s) Oral at bedtime  buDESOnide  80 MICROgram(s)/formoterol 4.5 MICROgram(s) Inhaler 2 Puff(s) Inhalation two times a day  chlorhexidine 4% Liquid 1 Application(s) Topical <User Schedule>  enalapril 10 milliGRAM(s) Oral daily  levothyroxine 25 MICROGram(s) Oral daily  metoprolol tartrate 25 milliGRAM(s) Oral two times a day  pantoprazole  Injectable 40 milliGRAM(s) IV Push daily    PRN MEDICATIONS  ALBUTerol/ipratropium for Nebulization 3 milliLiter(s) Nebulizer every 6 hours PRN    VITALS:   T(F): 96.6  HR: 62  BP: 110/55  RR: 18  SpO2: 99%    LABS:                        8.3    6.54  )-----------( 207      ( 14 Aug 2019 05:32 )             26.0     08-14    144  |  109  |  6<L>  ----------------------------<  102<H>  4.0   |  24  |  0.8    Ca    8.4<L>      14 Aug 2019 05:32  Mg     2.1     08-14    TPro  5.5<L>  /  Alb  3.3<L>  /  TBili  0.2  /  DBili  x   /  AST  17  /  ALT  12  /  AlkPhos  52  08-14                      PHYSICAL EXAM:  GENERAL: NAD  CHEST/LUNG: CTAB  HEART: Regular rate and rhythm; s1 s2 appreciated, loud murmurs in tricuspid and mitral areas.  ABDOMEN: Soft, Nontender, Nondistended; Bowel sounds present  EXTREMITIES: No LE edema b/l  NERVOUS SYSTEM:  Alert & Oriented X3

## 2019-08-14 NOTE — PROGRESS NOTE ADULT - ASSESSMENT
79 year old Female with history of GIB, PUD, diverticulosis, hemorrhoids, HTN, COPD, severe MR, HFpEF, hypothyroidism p/w GIB. Hgb on admission 6.4.  S/p Colonoscopy on 8/12/2019 - suboptimal prep, no blood noted in colon, severe diverticulosis in AC, TC , 3 polyps removed by hot snare , small hemorrhoids       #Lower GI bleed/ Acute blood loss anemia  - The patient denied any new bloody bowel movement.  -Hb today was 8.3 we will monitor for tomorrow  - Protonix IV daily   - IR following, GI following  - hold ASA  - pt received 5 units of PRBC since admission, keep Hb above 7.5, keep type and cross active  - S/p Colonoscopy on 8/12/2019: no active bleeds, bleeding believed to be due to diverticulosis.  - Due to suboptimal prep, recommend repeat colonoscopy in 6 months  - was started on DASH Diet     History of COPD  - stable, Nebs PRN  - supplement oxygen, monitor pulse Ox     HTN  - continue home meds lopressor and enalapril; amlodipine and lasix held due to stable BP      Hypothyroid  - c/w Synthroid     DVT prophylaxis   - SCD b/l LE        Dispo; To home  FULL CODE

## 2019-08-15 ENCOUNTER — TRANSCRIPTION ENCOUNTER (OUTPATIENT)
Age: 79
End: 2019-08-15

## 2019-08-15 VITALS
DIASTOLIC BLOOD PRESSURE: 68 MMHG | HEART RATE: 61 BPM | SYSTOLIC BLOOD PRESSURE: 139 MMHG | RESPIRATION RATE: 18 BRPM | TEMPERATURE: 98 F

## 2019-08-15 LAB
ANION GAP SERPL CALC-SCNC: 11 MMOL/L — SIGNIFICANT CHANGE UP (ref 7–14)
BUN SERPL-MCNC: 8 MG/DL — LOW (ref 10–20)
CALCIUM SERPL-MCNC: 8.8 MG/DL — SIGNIFICANT CHANGE UP (ref 8.5–10.1)
CHLORIDE SERPL-SCNC: 106 MMOL/L — SIGNIFICANT CHANGE UP (ref 98–110)
CO2 SERPL-SCNC: 24 MMOL/L — SIGNIFICANT CHANGE UP (ref 17–32)
CREAT SERPL-MCNC: 0.8 MG/DL — SIGNIFICANT CHANGE UP (ref 0.7–1.5)
GLUCOSE SERPL-MCNC: 102 MG/DL — HIGH (ref 70–99)
HCT VFR BLD CALC: 29 % — LOW (ref 37–47)
HGB BLD-MCNC: 9.2 G/DL — LOW (ref 12–16)
MCHC RBC-ENTMCNC: 30.1 PG — SIGNIFICANT CHANGE UP (ref 27–31)
MCHC RBC-ENTMCNC: 31.7 G/DL — LOW (ref 32–37)
MCV RBC AUTO: 94.8 FL — SIGNIFICANT CHANGE UP (ref 81–99)
NRBC # BLD: 0 /100 WBCS — SIGNIFICANT CHANGE UP (ref 0–0)
PLATELET # BLD AUTO: 226 K/UL — SIGNIFICANT CHANGE UP (ref 130–400)
POTASSIUM SERPL-MCNC: 4.1 MMOL/L — SIGNIFICANT CHANGE UP (ref 3.5–5)
POTASSIUM SERPL-SCNC: 4.1 MMOL/L — SIGNIFICANT CHANGE UP (ref 3.5–5)
RBC # BLD: 3.06 M/UL — LOW (ref 4.2–5.4)
RBC # FLD: 14.9 % — HIGH (ref 11.5–14.5)
SODIUM SERPL-SCNC: 141 MMOL/L — SIGNIFICANT CHANGE UP (ref 135–146)
WBC # BLD: 5.23 K/UL — SIGNIFICANT CHANGE UP (ref 4.8–10.8)
WBC # FLD AUTO: 5.23 K/UL — SIGNIFICANT CHANGE UP (ref 4.8–10.8)

## 2019-08-15 PROCEDURE — 99239 HOSP IP/OBS DSCHRG MGMT >30: CPT

## 2019-08-15 RX ORDER — DOCUSATE SODIUM 100 MG
1 CAPSULE ORAL
Qty: 14 | Refills: 0
Start: 2019-08-15

## 2019-08-15 RX ORDER — DOCUSATE SODIUM 100 MG
1 CAPSULE ORAL
Qty: 28 | Refills: 0
Start: 2019-08-15 | End: 2019-08-28

## 2019-08-15 RX ADMIN — PANTOPRAZOLE SODIUM 40 MILLIGRAM(S): 20 TABLET, DELAYED RELEASE ORAL at 11:34

## 2019-08-15 RX ADMIN — Medication 10 MILLIGRAM(S): at 05:31

## 2019-08-15 RX ADMIN — Medication 1 TABLET(S): at 11:34

## 2019-08-15 RX ADMIN — BUDESONIDE AND FORMOTEROL FUMARATE DIHYDRATE 2 PUFF(S): 160; 4.5 AEROSOL RESPIRATORY (INHALATION) at 07:45

## 2019-08-15 RX ADMIN — Medication 25 MICROGRAM(S): at 05:31

## 2019-08-15 RX ADMIN — Medication 325 MILLIGRAM(S): at 11:34

## 2019-08-15 RX ADMIN — CHLORHEXIDINE GLUCONATE 1 APPLICATION(S): 213 SOLUTION TOPICAL at 05:31

## 2019-08-15 RX ADMIN — Medication 25 MILLIGRAM(S): at 05:31

## 2019-08-15 RX ADMIN — Medication 3 MILLILITER(S): at 06:34

## 2019-08-15 NOTE — DISCHARGE NOTE PROVIDER - CARE PROVIDER_API CALL
Mac Mercado)  Gastroenterology; Internal Medicine  [address] [place] [address]  Phone: [phone]  Fax: [phone]  Follow Up Time: 1 week    Jas Renner)  Internal Medicine  [address] [facility], [facility] [address]  [place] [address]  Phone: [phone]  Fax: [phone]  Follow Up Time: 1 week

## 2019-08-15 NOTE — DISCHARGE NOTE PROVIDER - PROVIDER TOKENS
PROVIDER:[TOKEN:[[id number]],FOLLOWUP:[1 week]],PROVIDER:[TOKEN:[[id number]],FOLLOWUP:[1 week]]

## 2019-08-15 NOTE — DISCHARGE NOTE PROVIDER - NSDCCPCAREPLAN_GEN_ALL_CORE_FT
PRINCIPAL DISCHARGE DIAGNOSIS  Diagnosis: Rectal bleeding  Assessment and Plan of Treatment: [facility] becaus eof lower GI bleed that presented as fresh blood with bowel movement. When you came to the ED you had a Hemoglobin level of 6.4 so yoou received 2 unit of packed RBCs and admitted to the hospital. In the hospital you had another episode of bleeding resulting in dropping in your Hemoglobin level so you were admitted to the ICU for monitoring and when you stabilized you were downgraded to the floor where you were stable and had no new episodes of bleeding per rectum. You had a colonoscopy and it showed diverticulosis and hemorrhoids.  polyps were removed but they did not see any active bleeding. The colon was not well prepped so you will have a second colonoscopy in 6 months.      SECONDARY DISCHARGE DIAGNOSES  Diagnosis: Anemia  Assessment and Plan of Treatment: because of the lower GI bleed

## 2019-08-15 NOTE — DISCHARGE NOTE PROVIDER - CARE PROVIDERS DIRECT ADDRESSES
[email or web address], [email or web address]

## 2019-08-15 NOTE — DISCHARGE NOTE PROVIDER - HOSPITAL COURSE
78 yo F w/ hx of GIB, PUD, diverticulosis, hemorrhoids, HTN, COPD, severe MR, HFpEF, hypothyroidism p/w GIB. Pt had 1 episode of "burgundy" colored liquid in toilet bowel with formed stools, then another episode at night time.        In ED, hgb down to 6.4 from baseline of 10 in June 2019, rectal exam showed streak of red blood, placed on Protonix drip and given 2u PRBC 78 yo F w/ hx of GIB, PUD, diverticulosis, hemorrhoids, HTN, COPD, severe MR, HFpEF, hypothyroidism p/w GIB. Pt had 1 episode of "burgundy" colored liquid in toilet bowel with formed stools, then another episode at night time.        In ED, hgb down to 6.4 from baseline of 10 in June 2019, rectal exam showed streak of red blood, placed on Protonix drip and given 2u PRBC. The patient hemoglobin improves and she had a CT angiogram showing bleeding in the rectosigmoid region. She had a colonoscopy after that which didn't show any active bleeding however 3 polyps were found and removed surgically. The patient was found to have diverticulosis and hemorrhoid internally and externally. The patient had 2 more episodes of rectal bleeding and was transfused again and sent to the ICU for observation. She had a total of 5 units of PRBCs transfused to her. She had no more episodes of bleeding and was downgraded to the medical floor. Her hemoglobin was stable over 3 days with no recurrence of bleeding so we are discharging her with a follow up in the GI clinic. The colonoscopy will be repeated in 6 months because the first one was not well prepared. 78 yo F w/ hx of GIB, PUD, diverticulosis, hemorrhoids, HTN, COPD, severe MR, HFpEF, hypothyroidism p/w GIB. Pt had 1 episode of "burgundy" colored liquid in toilet bowel with formed stools, then another episode at night time.        In ED, hgb down to 6.4 from baseline of 10 in June 2019, rectal exam showed streak of red blood, placed on Protonix drip and given 2u PRBC. The patient hemoglobin improves and she had a CT angiogram showing bleeding in the rectosigmoid region. She had a colonoscopy after that which didn't show any active bleeding however 3 polyps were found and removed surgically. The patient was found to have diverticulosis and hemorrhoid internally and externally. The patient had 2 more episodes of rectal bleeding and was transfused again and sent to the ICU for observation. She had a total of 5 units of PRBCs transfused to her. She had no more episodes of bleeding and was downgraded to the medical floor. Her hemoglobin was stable over 3 days with no recurrence of bleeding so we are discharging her with a follow up in the GI clinic. The colonoscopy will be repeated in 6 months because the first one was not well prepared.        Attending Note:        Patient seen and examined independently. I personally had a face-to-face encounter with the patient, examined the patient myself and reviewed the plan of care with the housestaff. Agree with resident's note but my note supersedes that of the resident in the matters hereby listed.         PAtient's bleeding resolved. f/u GI for an eventual repeat Colonscopy.

## 2019-08-15 NOTE — DISCHARGE NOTE NURSING/CASE MANAGEMENT/SOCIAL WORK - NSDCDPATPORTLINK_GEN_ALL_CORE
You can access the [facility] system Patient Portal, offered by [facility], by registering with the following website: [email or web address]

## 2019-08-19 DIAGNOSIS — D62 ACUTE POSTHEMORRHAGIC ANEMIA: ICD-10-CM

## 2019-08-19 DIAGNOSIS — Z79.52 LONG TERM (CURRENT) USE OF SYSTEMIC STEROIDS: ICD-10-CM

## 2019-08-19 DIAGNOSIS — I08.9 RHEUMATIC MULTIPLE VALVE DISEASE, UNSPECIFIED: ICD-10-CM

## 2019-08-19 DIAGNOSIS — I34.0 NONRHEUMATIC MITRAL (VALVE) INSUFFICIENCY: ICD-10-CM

## 2019-08-19 DIAGNOSIS — K27.9 PEPTIC ULCER, SITE UNSPECIFIED, UNSPECIFIED AS ACUTE OR CHRONIC, WITHOUT HEMORRHAGE OR PERFORATION: ICD-10-CM

## 2019-08-19 DIAGNOSIS — K63.5 POLYP OF COLON: ICD-10-CM

## 2019-08-19 DIAGNOSIS — J44.9 CHRONIC OBSTRUCTIVE PULMONARY DISEASE, UNSPECIFIED: ICD-10-CM

## 2019-08-19 DIAGNOSIS — E03.9 HYPOTHYROIDISM, UNSPECIFIED: ICD-10-CM

## 2019-08-19 DIAGNOSIS — I11.0 HYPERTENSIVE HEART DISEASE WITH HEART FAILURE: ICD-10-CM

## 2019-08-19 DIAGNOSIS — K57.31 DIVERTICULOSIS OF LARGE INTESTINE WITHOUT PERFORATION OR ABSCESS WITH BLEEDING: ICD-10-CM

## 2019-08-19 DIAGNOSIS — K92.2 GASTROINTESTINAL HEMORRHAGE, UNSPECIFIED: ICD-10-CM

## 2019-08-19 DIAGNOSIS — I50.32 CHRONIC DIASTOLIC (CONGESTIVE) HEART FAILURE: ICD-10-CM

## 2019-08-19 DIAGNOSIS — I10 ESSENTIAL (PRIMARY) HYPERTENSION: ICD-10-CM

## 2020-01-17 ENCOUNTER — INPATIENT (INPATIENT)
Facility: HOSPITAL | Age: 80
LOS: 3 days | Discharge: HOME HEALTH | End: 2020-01-21
Attending: HOSPITALIST | Admitting: HOSPITALIST
Payer: MEDICARE

## 2020-01-17 VITALS
OXYGEN SATURATION: 99 % | DIASTOLIC BLOOD PRESSURE: 108 MMHG | SYSTOLIC BLOOD PRESSURE: 194 MMHG | WEIGHT: 164.91 LBS | HEART RATE: 65 BPM | RESPIRATION RATE: 30 BRPM

## 2020-01-17 DIAGNOSIS — Z98.890 OTHER SPECIFIED POSTPROCEDURAL STATES: Chronic | ICD-10-CM

## 2020-01-17 LAB
ALBUMIN SERPL ELPH-MCNC: 4.1 G/DL — SIGNIFICANT CHANGE UP (ref 3.5–5.2)
ALP SERPL-CCNC: 78 U/L — SIGNIFICANT CHANGE UP (ref 30–115)
ALT FLD-CCNC: 21 U/L — SIGNIFICANT CHANGE UP (ref 0–41)
ANION GAP SERPL CALC-SCNC: 16 MMOL/L — HIGH (ref 7–14)
AST SERPL-CCNC: 39 U/L — SIGNIFICANT CHANGE UP (ref 0–41)
BASE EXCESS BLDV CALC-SCNC: 3.1 MMOL/L — HIGH (ref -2–2)
BASOPHILS # BLD AUTO: 0.05 K/UL — SIGNIFICANT CHANGE UP (ref 0–0.2)
BASOPHILS NFR BLD AUTO: 1.2 % — HIGH (ref 0–1)
BILIRUB SERPL-MCNC: 0.4 MG/DL — SIGNIFICANT CHANGE UP (ref 0.2–1.2)
BUN SERPL-MCNC: 15 MG/DL — SIGNIFICANT CHANGE UP (ref 10–20)
CA-I SERPL-SCNC: 1.15 MMOL/L — SIGNIFICANT CHANGE UP (ref 1.12–1.3)
CALCIUM SERPL-MCNC: 9.3 MG/DL — SIGNIFICANT CHANGE UP (ref 8.5–10.1)
CHLORIDE SERPL-SCNC: 101 MMOL/L — SIGNIFICANT CHANGE UP (ref 98–110)
CO2 SERPL-SCNC: 23 MMOL/L — SIGNIFICANT CHANGE UP (ref 17–32)
CREAT SERPL-MCNC: 1 MG/DL — SIGNIFICANT CHANGE UP (ref 0.7–1.5)
EOSINOPHIL # BLD AUTO: 0.13 K/UL — SIGNIFICANT CHANGE UP (ref 0–0.7)
EOSINOPHIL NFR BLD AUTO: 3 % — SIGNIFICANT CHANGE UP (ref 0–8)
GAS PNL BLDV: 141 MMOL/L — SIGNIFICANT CHANGE UP (ref 136–145)
GAS PNL BLDV: SIGNIFICANT CHANGE UP
GLUCOSE SERPL-MCNC: 149 MG/DL — HIGH (ref 70–99)
HCO3 BLDV-SCNC: 30 MMOL/L — HIGH (ref 22–29)
HCT VFR BLD CALC: 36.8 % — LOW (ref 37–47)
HCT VFR BLDA CALC: 43.6 % — SIGNIFICANT CHANGE UP (ref 34–44)
HGB BLD CALC-MCNC: 14.2 G/DL — SIGNIFICANT CHANGE UP (ref 14–18)
HGB BLD-MCNC: 11.2 G/DL — LOW (ref 12–16)
IMM GRANULOCYTES NFR BLD AUTO: 0.5 % — HIGH (ref 0.1–0.3)
LACTATE BLDV-MCNC: 1.9 MMOL/L — HIGH (ref 0.5–1.6)
LYMPHOCYTES # BLD AUTO: 1.14 K/UL — LOW (ref 1.2–3.4)
LYMPHOCYTES # BLD AUTO: 26.4 % — SIGNIFICANT CHANGE UP (ref 20.5–51.1)
MCHC RBC-ENTMCNC: 29.2 PG — SIGNIFICANT CHANGE UP (ref 27–31)
MCHC RBC-ENTMCNC: 30.4 G/DL — LOW (ref 32–37)
MCV RBC AUTO: 95.8 FL — SIGNIFICANT CHANGE UP (ref 81–99)
MONOCYTES # BLD AUTO: 0.43 K/UL — SIGNIFICANT CHANGE UP (ref 0.1–0.6)
MONOCYTES NFR BLD AUTO: 10 % — HIGH (ref 1.7–9.3)
NEUTROPHILS # BLD AUTO: 2.55 K/UL — SIGNIFICANT CHANGE UP (ref 1.4–6.5)
NEUTROPHILS NFR BLD AUTO: 58.9 % — SIGNIFICANT CHANGE UP (ref 42.2–75.2)
NRBC # BLD: 0 /100 WBCS — SIGNIFICANT CHANGE UP (ref 0–0)
NT-PROBNP SERPL-SCNC: 881 PG/ML — HIGH (ref 0–300)
PCO2 BLDV: 57 MMHG — HIGH (ref 41–51)
PH BLDV: 7.33 — SIGNIFICANT CHANGE UP (ref 7.26–7.43)
PLATELET # BLD AUTO: 200 K/UL — SIGNIFICANT CHANGE UP (ref 130–400)
PO2 BLDV: 26 MMHG — SIGNIFICANT CHANGE UP (ref 20–40)
POTASSIUM BLDV-SCNC: 4.6 MMOL/L — SIGNIFICANT CHANGE UP (ref 3.3–5.6)
POTASSIUM SERPL-MCNC: 4.8 MMOL/L — SIGNIFICANT CHANGE UP (ref 3.5–5)
POTASSIUM SERPL-SCNC: 4.8 MMOL/L — SIGNIFICANT CHANGE UP (ref 3.5–5)
PROT SERPL-MCNC: 8.4 G/DL — HIGH (ref 6–8)
RBC # BLD: 3.84 M/UL — LOW (ref 4.2–5.4)
RBC # FLD: 14.3 % — SIGNIFICANT CHANGE UP (ref 11.5–14.5)
SAO2 % BLDV: 37 % — SIGNIFICANT CHANGE UP
SODIUM SERPL-SCNC: 140 MMOL/L — SIGNIFICANT CHANGE UP (ref 135–146)
TROPONIN T SERPL-MCNC: <0.01 NG/ML — SIGNIFICANT CHANGE UP
WBC # BLD: 4.32 K/UL — LOW (ref 4.8–10.8)
WBC # FLD AUTO: 4.32 K/UL — LOW (ref 4.8–10.8)

## 2020-01-17 PROCEDURE — 71045 X-RAY EXAM CHEST 1 VIEW: CPT | Mod: 26

## 2020-01-17 PROCEDURE — 93010 ELECTROCARDIOGRAM REPORT: CPT

## 2020-01-17 PROCEDURE — 99285 EMERGENCY DEPT VISIT HI MDM: CPT

## 2020-01-17 RX ORDER — METOPROLOL TARTRATE 50 MG
25 TABLET ORAL
Refills: 0 | Status: DISCONTINUED | OUTPATIENT
Start: 2020-01-17 | End: 2020-01-21

## 2020-01-17 RX ORDER — FUROSEMIDE 40 MG
40 TABLET ORAL DAILY
Refills: 0 | Status: DISCONTINUED | OUTPATIENT
Start: 2020-01-17 | End: 2020-01-18

## 2020-01-17 RX ORDER — ATORVASTATIN CALCIUM 80 MG/1
20 TABLET, FILM COATED ORAL AT BEDTIME
Refills: 0 | Status: DISCONTINUED | OUTPATIENT
Start: 2020-01-17 | End: 2020-01-21

## 2020-01-17 RX ORDER — ENOXAPARIN SODIUM 100 MG/ML
40 INJECTION SUBCUTANEOUS AT BEDTIME
Refills: 0 | Status: DISCONTINUED | OUTPATIENT
Start: 2020-01-17 | End: 2020-01-21

## 2020-01-17 RX ORDER — IPRATROPIUM/ALBUTEROL SULFATE 18-103MCG
3 AEROSOL WITH ADAPTER (GRAM) INHALATION ONCE
Refills: 0 | Status: COMPLETED | OUTPATIENT
Start: 2020-01-17 | End: 2020-01-17

## 2020-01-17 RX ORDER — DEXAMETHASONE 0.5 MG/5ML
10 ELIXIR ORAL ONCE
Refills: 0 | Status: COMPLETED | OUTPATIENT
Start: 2020-01-17 | End: 2020-01-17

## 2020-01-17 RX ORDER — IPRATROPIUM/ALBUTEROL SULFATE 18-103MCG
3 AEROSOL WITH ADAPTER (GRAM) INHALATION EVERY 6 HOURS
Refills: 0 | Status: DISCONTINUED | OUTPATIENT
Start: 2020-01-17 | End: 2020-01-21

## 2020-01-17 RX ORDER — AMLODIPINE BESYLATE 2.5 MG/1
5 TABLET ORAL DAILY
Refills: 0 | Status: DISCONTINUED | OUTPATIENT
Start: 2020-01-17 | End: 2020-01-21

## 2020-01-17 RX ORDER — FUROSEMIDE 40 MG
80 TABLET ORAL ONCE
Refills: 0 | Status: COMPLETED | OUTPATIENT
Start: 2020-01-17 | End: 2020-01-17

## 2020-01-17 RX ORDER — LEVOTHYROXINE SODIUM 125 MCG
0 TABLET ORAL
Qty: 90 | Refills: 0 | DISCHARGE

## 2020-01-17 RX ORDER — ALBUTEROL 90 UG/1
1 AEROSOL, METERED ORAL EVERY 4 HOURS
Refills: 0 | Status: DISCONTINUED | OUTPATIENT
Start: 2020-01-17 | End: 2020-01-21

## 2020-01-17 RX ORDER — PANTOPRAZOLE SODIUM 20 MG/1
40 TABLET, DELAYED RELEASE ORAL
Refills: 0 | Status: DISCONTINUED | OUTPATIENT
Start: 2020-01-17 | End: 2020-01-21

## 2020-01-17 RX ORDER — LEVOTHYROXINE SODIUM 125 MCG
25 TABLET ORAL DAILY
Refills: 0 | Status: DISCONTINUED | OUTPATIENT
Start: 2020-01-17 | End: 2020-01-21

## 2020-01-17 RX ADMIN — ATORVASTATIN CALCIUM 20 MILLIGRAM(S): 80 TABLET, FILM COATED ORAL at 22:24

## 2020-01-17 RX ADMIN — Medication 80 MILLIGRAM(S): at 17:17

## 2020-01-17 RX ADMIN — ENOXAPARIN SODIUM 40 MILLIGRAM(S): 100 INJECTION SUBCUTANEOUS at 22:24

## 2020-01-17 RX ADMIN — Medication 3 MILLILITER(S): at 17:00

## 2020-01-17 RX ADMIN — Medication 3 MILLILITER(S): at 17:03

## 2020-01-17 RX ADMIN — Medication 3 MILLILITER(S): at 17:02

## 2020-01-17 RX ADMIN — Medication 10 MILLIGRAM(S): at 17:04

## 2020-01-17 NOTE — H&P ADULT - ATTENDING COMMENTS
Patient seen and examined independently. Resident's H & P reviewed. Agree with the findings and plan of care except,     A 79 years old female presented to the ED complaining that she has dry cough for the last one week. Pt states that yesterday when she went outside in cold air she became short of breath and had chest tightness. No fever, cold or chills.    CXR: Infiltrates on the Rt side of the chest  WBC: 6.59  BNP: 881  CE x 3 negative  EKG: Sinus bradycardia @ 52/min. LAD. LVH. (Interpreted by me.)    O/E: Crackles in the Rt. Middle/lateral chest. No wheezing.     ASSESSMENT:    1. SOB  2. Possible PNA - GNR  3. H/O COPD  4. Severe MR  5. HTN  6. H/O GIB / PUD  7. Ac. Hypoxic / Hypercapnic respiratory failure  8. H/O Ch. HFpEF (No acute CHF)    PLAN:    . Start her on Cefepime and Vanco  . Check virus panel  . Check MRSA PCR  . Pulmonary eval  . Check cxs  . Switch her back to Lasix 40 mg po daily  . Cont Nebs  . Can D/C tele

## 2020-01-17 NOTE — ED PROVIDER NOTE - NS ED ROS FT
Constitutional:  See HPI.   Eyes:  No visual changes, eye pain or discharge.  ENMT:  No hearing changes, pain, discharge or infections. No neck pain or stiffness.  Cardiac:  No chest pain, +SOB, + edema. No chest pain with exertion.  Respiratory:  No cough or respiratory distress. No hemoptysis.  GI:  No nausea, vomiting, diarrhea, abdominal pain.  :  No dysuria, frequency, hematuria  MS:  No joint pain or back pain.  Neuro:  No LOC. No headache or weakness.    Skin:  No skin rash.  Except as in HPI, all other review of systems is negative

## 2020-01-17 NOTE — H&P ADULT - NSHPREVIEWOFSYSTEMS_GEN_ALL_CORE
REVIEW OF SYSTEMS:  CONSTITUTIONAL: Admits to weakness. Denies fevers or chills  EYES/ENT: No visual changes;  No vertigo or throat pain   NECK: No pain or stiffness  RESPIRATORY: No cough, wheezing, hemoptysis; Admits to shortness of breath  CARDIOVASCULAR: No chest pain or palpitations  GASTROINTESTINAL: No abdominal or epigastric pain. No nausea, vomiting, or hematemesis; No diarrhea or constipation. No melena or hematochezia.  GENITOURINARY: No dysuria, frequency or hematuria  NEUROLOGICAL: No numbness or weakness  SKIN: No itching, rashes

## 2020-01-17 NOTE — H&P ADULT - HISTORY OF PRESENT ILLNESS
80 yo F w/ PMH of GIB, PUD, diverticulosis, hemorrhoids, HTN, COPD, severe MR, HFpEF, hypothyroidism presents to ED complaining of shortness of breath. As per pt, she has been having SOB for awhile and over these few weeks it been getting worse. Today, pt went outside and noticed tightness in chest associated with worsening SOB. Pt decided to present to ED for workup. Pt states she went to her PCP few weeks back because she was having fever and chills. She was prescribed abx but without improvement. Today, pt denies fever, chill, palpitation, diarrhea/constipation, dysuria, sick contacts or recent travel. Pt states she is compliant with her medications.    Vital Signs Last 24 Hrs  T(C): 36.6 (17 Jan 2020 19:57), Max: 37.7 (17 Jan 2020 16:45)  T(F): 97.8 (17 Jan 2020 19:57), Max: 99.9 (17 Jan 2020 16:45)  HR: 60 (17 Jan 2020 19:57) (54 - 67)  BP: 142/69 (17 Jan 2020 19:57) (130/61 - 194/108)  BP(mean): --  RR: 18 (17 Jan 2020 19:57) (17 - 30)  SpO2: 93% (17 Jan 2020 19:57) (93% - 100%)    In ED, pt has CXR showing right lung field congestion (pending official read). Pt is admitted to medicine for suspected CHF vs COPD exacerbation.

## 2020-01-17 NOTE — ED PROVIDER NOTE - PHYSICAL EXAMINATION
CONSTITUTIONAL: Well-developed; uncomfortable due to respiratory distress  SKIN: warm, dry  HEAD: Normocephalic; atraumatic.  EYES: PERRL, EOMI, no conjunctival erythema  ENT: No nasal discharge; airway clear.  NECK: Supple; non tender.  CARD: S1, S2 normal; no murmurs, gallops, or rubs. Regular rate and rhythm.   RESP: No wheezes, +bilateral rales or rhonchi.  ABD: soft ntnd  EXT: Normal ROM.  No clubbing, cyanosis, + Bilateral edema.   LYMPH: No acute cervical adenopathy.  NEURO: Alert, oriented, grossly unremarkable  PSYCH: Cooperative, appropriate.

## 2020-01-17 NOTE — ED ADULT TRIAGE NOTE - CHIEF COMPLAINT QUOTE
Pt with SOB after walking. Arrived with NRB on. hx COPD. Sats maintained but pt continues to be tachypneic with accessory muscle use.

## 2020-01-17 NOTE — H&P ADULT - ASSESSMENT
78 yo F w/ PMH of GIB, PUD, diverticulosis, hemorrhoids, HTN, COPD, severe MR, HFpEF, hypothyroidism presents to ED complaining of shortness of breath.     # SOB  - likely secondary to HFpEF exacerbation   - other DDx include probable influzena infection, CAP, COPD exacerbation vs less likely PE  - BNP elevated 881  - will c/w lasix 40 mg IV qD   - repeat ECHO  - trend Troponin   - no wheezing on PE, will not give steroid  - duoneb q6h and neb q4h PRN   - flu swab f/u result  - mild leukopenia can be secondary to viral infection  - will not give antibiotic given no fever, productive sputum or leukocytosis   - Well's score for PE is low risk (score of 0), doubt PE     # h/o COPD  - stable, Nebs PRN  - supplement oxygen PRN, monitor pulse Ox     # HTN  - c/w home medications     # h/o MR  - monitor for volume overload    # Hypothyroid  - c/w Synthroid     Diet: DASH diet w/ fluid restriction  GI ppx: Protonix qD  DVT ppx: Lovenox 40 mg qD  Activity: Increase  as tolerated  Code status: Full Code ( X ) / DNR (  ) / DNI (  )  Disposition: from home, requires medical management

## 2020-01-17 NOTE — H&P ADULT - NSHPPHYSICALEXAM_GEN_ALL_CORE
PHYSICAL EXAM:  GENERAL: NAD, AAO x 4, 79y F on 2L NC  HEAD:  Atraumatic, Normocephalic  EYES: EOMI, conjunctiva clear and sclera white  NECK: Supple, No JVD  CHEST/LUNG: Lung crackles R>L; No accessory muscles used  HEART: Regular rate and rhythm; No murmurs;   ABDOMEN: Soft, Nontender, Nondistended; Bowel sounds present; No guarding  EXTREMITIES:  2+ pitting edema, 2+ Peripheral Pulses, No cyanosis.  NEUROLOGY: non-focal PHYSICAL EXAM:  GENERAL: NAD, AAO x 4, 79y F on 2L NC  HEAD:  Atraumatic, Normocephalic  EYES: EOMI, conjunctiva clear and sclera white  NECK: Supple, No JVD  CHEST/LUNG: Lung crackles R>L; No accessory muscles used  HEART/CVS: Regular rate and rhythm; No murmurs;   ABDOMEN/GI: Soft, Nontender, Nondistended; Bowel sounds present; No guarding  EXTREMITIES:  2+ pitting edema, 2+ Peripheral Pulses, No cyanosis.  NEUROLOGY: non-focal

## 2020-01-17 NOTE — ED PROVIDER NOTE - ATTENDING CONTRIBUTION TO CARE
79 year old female, pmhx COPD, CHF, HTN, presenting with acute onset dyspnea. Patient was walking at which time she felt sudden shortness of breath 2 hours PTA. She went home and tried resting without improvement, so she called 911. States it feels similar to prior COPD exacerbations. Did not use any medications at home PTA. Per EMS report, patient hypoxic to 70s on RA (not on home O2 at baseline) - improved to low 90s on NRB. Patient otherwise denies pain and denies fevers, headache, vision changes, weakness/numbness, confusion, URI symptoms, neck pain, chest pain, back pain, cough, palpitations, nausea, vomiting, abdominal pain, diarrhea, constipation, blood in stool/dark stools, urinary symptoms, vaginal bleeding/discharge, leg swelling, rash, recent travel or sick contacts.    Vital Signs: I have reviewed the initial vital signs.  Constitutional: Well-nourished, appears stated age, (+) moderate respiratory distress  HEENT: Airway patent, moist MM, no erythema/swelling/deformity of oral structures. EOMI, PERRLA.  CV: regular rate, regular rhythm, well-perfused extremities, 2+ b/l DP and radial pulses equal. (+) trace bilateral pitting edema  Lungs: (+) bilateral wheezing and rhonchi, (+) moderately increased WOB  ABD: NTND, no guarding or rebound, no pulsatile mass, no hernias.   MSK: Neck supple, nontender, nl ROM, no stepoff. Chest nontender. Back nontender in TLS spine or to b/l bony structures or flanks. Ext nontender, nl rom, no deformity.   INTEG: Skin warm, dry, no rash.  NEURO: A&Ox3, normal strength, nl sensation throughout, normal speech.   PSYCH: Calm, cooperative, normal affect and interaction.    Patient seen immediately on arrival - placed on bipap with improvement. POCUS at bedside showed (+) diffuse b-lines, likely fluid overload. Patient also wheezing on exam however. Will give nebs x3, steroids, IV lasix, continue bipap, labs, EKG, CXR, re-eval. Afebrile at this time.

## 2020-01-17 NOTE — ED PROVIDER NOTE - CLINICAL SUMMARY MEDICAL DECISION MAKING FREE TEXT BOX
80 yo F w/ PMH of GIB, PUD, diverticulosis, hemorrhoids, HTN, COPD, severe MR, HFpEF, hypothyroidism presents to ED complaining of shortness of breath.   IV placed, labs sent, CXR done. CXR showing right lung field congestion.  's, Trop normal. Patient feeling improvement with treatment. Concern for COPD exacerbation  Pt is admitted to medicine for suspected CHF vs COPD exacerbation.

## 2020-01-17 NOTE — ED PROVIDER NOTE - OBJECTIVE STATEMENT
80 yo F w/ hx of GIB, PUD, diverticulosis, hemorrhoids, HTN, COPD, severe MR, HFpEF, hypothyroidism pw shortness of breath, fever, CP 78 yo F w/ hx of GIB, PUD, diverticulosis, hemorrhoids, HTN, COPD, severe MR, HFpEF, hypothyroidism pw shortness of breath, sudden in onset 2 hours prior no fever no chills, pt was not improved after non rebreather, no NVDabdominal pain. + Bilateral leg edema.

## 2020-01-17 NOTE — H&P ADULT - NSHPLABSRESULTS_GEN_ALL_CORE
LABS:                          11.2   4.32  )-----------( 200      ( 17 Jan 2020 17:03 )             36.8     01-17    140  |  101  |  15  ----------------------------<  149<H>  4.8   |  23  |  1.0    Ca    9.3      17 Jan 2020 17:03    TPro  8.4<H>  /  Alb  4.1  /  TBili  0.4  /  DBili  x   /  AST  39  /  ALT  21  /  AlkPhos  78  01-17              Lactate Trend    CARDIAC MARKERS ( 17 Jan 2020 17:03 )  x     / <0.01 ng/mL / x     / x     / x          CAPILLARY BLOOD GLUCOSE    Serum Pro-Brain Natriuretic Peptide (01.17.20 @ 17:03)    Serum Pro-Brain Natriuretic Peptide: 881 pg/mL          RADIOLOGY:      CXR pending official read    EKGS:

## 2020-01-17 NOTE — ED PROVIDER NOTE - CARE PLAN
Principal Discharge DX:	CHF (congestive heart failure)  Secondary Diagnosis:	COPD (chronic obstructive pulmonary disease)

## 2020-01-18 LAB
ALBUMIN SERPL ELPH-MCNC: 3.8 G/DL — SIGNIFICANT CHANGE UP (ref 3.5–5.2)
ALP SERPL-CCNC: 67 U/L — SIGNIFICANT CHANGE UP (ref 30–115)
ALT FLD-CCNC: 17 U/L — SIGNIFICANT CHANGE UP (ref 0–41)
ANION GAP SERPL CALC-SCNC: 14 MMOL/L — SIGNIFICANT CHANGE UP (ref 7–14)
AST SERPL-CCNC: 18 U/L — SIGNIFICANT CHANGE UP (ref 0–41)
BASOPHILS # BLD AUTO: 0.01 K/UL — SIGNIFICANT CHANGE UP (ref 0–0.2)
BASOPHILS NFR BLD AUTO: 0.2 % — SIGNIFICANT CHANGE UP (ref 0–1)
BILIRUB SERPL-MCNC: 0.7 MG/DL — SIGNIFICANT CHANGE UP (ref 0.2–1.2)
BUN SERPL-MCNC: 18 MG/DL — SIGNIFICANT CHANGE UP (ref 10–20)
CALCIUM SERPL-MCNC: 9.3 MG/DL — SIGNIFICANT CHANGE UP (ref 8.5–10.1)
CHLORIDE SERPL-SCNC: 100 MMOL/L — SIGNIFICANT CHANGE UP (ref 98–110)
CHOLEST SERPL-MCNC: 177 MG/DL — SIGNIFICANT CHANGE UP (ref 100–200)
CO2 SERPL-SCNC: 26 MMOL/L — SIGNIFICANT CHANGE UP (ref 17–32)
CREAT SERPL-MCNC: 0.9 MG/DL — SIGNIFICANT CHANGE UP (ref 0.7–1.5)
EOSINOPHIL # BLD AUTO: 0 K/UL — SIGNIFICANT CHANGE UP (ref 0–0.7)
EOSINOPHIL NFR BLD AUTO: 0 % — SIGNIFICANT CHANGE UP (ref 0–8)
FLU A RESULT: NEGATIVE — SIGNIFICANT CHANGE UP
FLU A RESULT: NEGATIVE — SIGNIFICANT CHANGE UP
FLUAV AG NPH QL: NEGATIVE — SIGNIFICANT CHANGE UP
FLUBV AG NPH QL: NEGATIVE — SIGNIFICANT CHANGE UP
GLUCOSE SERPL-MCNC: 126 MG/DL — HIGH (ref 70–99)
HCT VFR BLD CALC: 33.1 % — LOW (ref 37–47)
HDLC SERPL-MCNC: 79 MG/DL — SIGNIFICANT CHANGE UP
HGB BLD-MCNC: 10.3 G/DL — LOW (ref 12–16)
IMM GRANULOCYTES NFR BLD AUTO: 0.5 % — HIGH (ref 0.1–0.3)
LACTATE SERPL-SCNC: 0.9 MMOL/L — SIGNIFICANT CHANGE UP (ref 0.7–2)
LIPID PNL WITH DIRECT LDL SERPL: 96 MG/DL — SIGNIFICANT CHANGE UP (ref 4–129)
LYMPHOCYTES # BLD AUTO: 0.5 K/UL — LOW (ref 1.2–3.4)
LYMPHOCYTES # BLD AUTO: 7.6 % — LOW (ref 20.5–51.1)
MAGNESIUM SERPL-MCNC: 2 MG/DL — SIGNIFICANT CHANGE UP (ref 1.8–2.4)
MCHC RBC-ENTMCNC: 29.2 PG — SIGNIFICANT CHANGE UP (ref 27–31)
MCHC RBC-ENTMCNC: 31.1 G/DL — LOW (ref 32–37)
MCV RBC AUTO: 93.8 FL — SIGNIFICANT CHANGE UP (ref 81–99)
MONOCYTES # BLD AUTO: 0.35 K/UL — SIGNIFICANT CHANGE UP (ref 0.1–0.6)
MONOCYTES NFR BLD AUTO: 5.3 % — SIGNIFICANT CHANGE UP (ref 1.7–9.3)
MRSA PCR RESULT.: NEGATIVE — SIGNIFICANT CHANGE UP
NEUTROPHILS # BLD AUTO: 5.7 K/UL — SIGNIFICANT CHANGE UP (ref 1.4–6.5)
NEUTROPHILS NFR BLD AUTO: 86.4 % — HIGH (ref 42.2–75.2)
NRBC # BLD: 0 /100 WBCS — SIGNIFICANT CHANGE UP (ref 0–0)
PHOSPHATE SERPL-MCNC: 4.9 MG/DL — SIGNIFICANT CHANGE UP (ref 2.1–4.9)
PLATELET # BLD AUTO: 190 K/UL — SIGNIFICANT CHANGE UP (ref 130–400)
POTASSIUM SERPL-MCNC: 3.9 MMOL/L — SIGNIFICANT CHANGE UP (ref 3.5–5)
POTASSIUM SERPL-SCNC: 3.9 MMOL/L — SIGNIFICANT CHANGE UP (ref 3.5–5)
PROT SERPL-MCNC: 7.5 G/DL — SIGNIFICANT CHANGE UP (ref 6–8)
RBC # BLD: 3.53 M/UL — LOW (ref 4.2–5.4)
RBC # FLD: 14.3 % — SIGNIFICANT CHANGE UP (ref 11.5–14.5)
RSV RESULT: NEGATIVE — SIGNIFICANT CHANGE UP
RSV RNA RESP QL NAA+PROBE: NEGATIVE — SIGNIFICANT CHANGE UP
SODIUM SERPL-SCNC: 140 MMOL/L — SIGNIFICANT CHANGE UP (ref 135–146)
TOTAL CHOLESTEROL/HDL RATIO MEASUREMENT: 2.2 RATIO — LOW (ref 4–5.5)
TRIGL SERPL-MCNC: 37 MG/DL — SIGNIFICANT CHANGE UP (ref 10–149)
TROPONIN T SERPL-MCNC: <0.01 NG/ML — SIGNIFICANT CHANGE UP
TROPONIN T SERPL-MCNC: <0.01 NG/ML — SIGNIFICANT CHANGE UP
WBC # BLD: 6.59 K/UL — SIGNIFICANT CHANGE UP (ref 4.8–10.8)
WBC # FLD AUTO: 6.59 K/UL — SIGNIFICANT CHANGE UP (ref 4.8–10.8)

## 2020-01-18 PROCEDURE — 71046 X-RAY EXAM CHEST 2 VIEWS: CPT | Mod: 26

## 2020-01-18 PROCEDURE — 71260 CT THORAX DX C+: CPT | Mod: 26

## 2020-01-18 PROCEDURE — 99223 1ST HOSP IP/OBS HIGH 75: CPT

## 2020-01-18 RX ORDER — VANCOMYCIN HCL 1 G
1000 VIAL (EA) INTRAVENOUS EVERY 12 HOURS
Refills: 0 | Status: DISCONTINUED | OUTPATIENT
Start: 2020-01-18 | End: 2020-01-19

## 2020-01-18 RX ORDER — CEFTRIAXONE 500 MG/1
1000 INJECTION, POWDER, FOR SOLUTION INTRAMUSCULAR; INTRAVENOUS EVERY 24 HOURS
Refills: 0 | Status: DISCONTINUED | OUTPATIENT
Start: 2020-01-18 | End: 2020-01-21

## 2020-01-18 RX ORDER — VANCOMYCIN HCL 1 G
VIAL (EA) INTRAVENOUS
Refills: 0 | Status: DISCONTINUED | OUTPATIENT
Start: 2020-01-18 | End: 2020-01-18

## 2020-01-18 RX ADMIN — AMLODIPINE BESYLATE 5 MILLIGRAM(S): 2.5 TABLET ORAL at 06:00

## 2020-01-18 RX ADMIN — Medication 40 MILLIGRAM(S): at 05:59

## 2020-01-18 RX ADMIN — Medication 25 MILLIGRAM(S): at 05:59

## 2020-01-18 RX ADMIN — ATORVASTATIN CALCIUM 20 MILLIGRAM(S): 80 TABLET, FILM COATED ORAL at 21:21

## 2020-01-18 RX ADMIN — CEFTRIAXONE 100 MILLIGRAM(S): 500 INJECTION, POWDER, FOR SOLUTION INTRAMUSCULAR; INTRAVENOUS at 12:48

## 2020-01-18 RX ADMIN — ENOXAPARIN SODIUM 40 MILLIGRAM(S): 100 INJECTION SUBCUTANEOUS at 21:21

## 2020-01-18 RX ADMIN — PANTOPRAZOLE SODIUM 40 MILLIGRAM(S): 20 TABLET, DELAYED RELEASE ORAL at 06:00

## 2020-01-18 RX ADMIN — Medication 1 TABLET(S): at 13:27

## 2020-01-18 RX ADMIN — Medication 25 MICROGRAM(S): at 05:59

## 2020-01-18 RX ADMIN — Medication 25 MILLIGRAM(S): at 17:08

## 2020-01-18 RX ADMIN — Medication 250 MILLIGRAM(S): at 17:08

## 2020-01-18 RX ADMIN — Medication 3 MILLILITER(S): at 08:18

## 2020-01-18 RX ADMIN — Medication 10 MILLIGRAM(S): at 06:00

## 2020-01-18 RX ADMIN — Medication 3 MILLILITER(S): at 20:38

## 2020-01-18 NOTE — PROGRESS NOTE ADULT - SUBJECTIVE AND OBJECTIVE BOX
Hospital Day:  1d  HPI:  80 yo F w/ PMH of GIB, PUD, diverticulosis, hemorrhoids, HTN, COPD, severe MR, HFpEF, hypothyroidism presents to ED complaining of shortness of breath. As per pt, she has been having SOB for awhile and over these few weeks it been getting worse. Today, pt went outside and noticed tightness in chest associated with worsening SOB. Pt decided to present to ED for workup. Pt states she went to her PCP few weeks back because she was having fever and chills. She was prescribed abx but without improvement. Today, pt denies fever, chill, palpitation, diarrhea/constipation, dysuria, sick contacts or recent travel. Pt states she is compliant with her medications.    Vital Signs Last 24 Hrs  T(C): 36.6 (17 Jan 2020 19:57), Max: 37.7 (17 Jan 2020 16:45)  T(F): 97.8 (17 Jan 2020 19:57), Max: 99.9 (17 Jan 2020 16:45)  HR: 60 (17 Jan 2020 19:57) (54 - 67)  BP: 142/69 (17 Jan 2020 19:57) (130/61 - 194/108)  BP(mean): --  RR: 18 (17 Jan 2020 19:57) (17 - 30)  SpO2: 93% (17 Jan 2020 19:57) (93% - 100%)    In ED, pt has CXR showing right lung field congestion (pending official read). Pt is admitted to medicine for suspected CHF vs COPD exacerbation. (17 Jan 2020 20:38)    Subjective:    Patient is a 79y old  Female who presents with a chief complaint of SOB (17 Jan 2020 20:38)    pt had no acute events overnight, she still coughing, she denied any SOB or chest pain this morning    Past Medical Hx:   Other specified hypothyroidism  Severe mitral regurgitation  COPD (chronic obstructive pulmonary disease)  CHF (congestive heart failure)  HTN (hypertension)    Past Sx:  History of cholecystectomy  No significant past surgical history    Allergies:  No Known Allergies     social Hx  Current Meds:   Standng Meds:  albuterol/ipratropium for Nebulization 3 milliLiter(s) Nebulizer every 6 hours  amLODIPine   Tablet 5 milliGRAM(s) Oral daily  atorvastatin 20 milliGRAM(s) Oral at bedtime  enalapril 10 milliGRAM(s) Oral daily  enoxaparin Injectable 40 milliGRAM(s) SubCutaneous at bedtime  levothyroxine 25 MICROGram(s) Oral daily  metoprolol tartrate 25 milliGRAM(s) Oral two times a day  multivitamin 1 Tablet(s) Oral daily  pantoprazole    Tablet 40 milliGRAM(s) Oral before breakfast  vancomycin  IVPB 1000 milliGRAM(s) IV Intermittent every 12 hours    PRN Meds:  ALBUTerol    90 MICROgram(s) HFA Inhaler 1 Puff(s) Inhalation every 4 hours PRN Bronchospasm    HOME MEDICATIONS:  albuterol 2.5 mg/3 mL (0.083%) inhalation solution: 3 milliliter(s) inhaled every 2 hours, As Needed  amLODIPine 5 mg oral tablet: 1 tab(s) orally once a day  atorvastatin 20 mg oral tablet: 1 tab(s) orally once a day  Breo Ellipta 100 mcg-25 mcg/inh inhalation powder: 1 puff(s) inhaled once a day  enalapril 10 mg oral tablet: 1 tab(s) orally once a day  furosemide 40 mg oral tablet: 1 tab(s) orally once a day  levothyroxine 25 mcg (0.025 mg) oral tablet: 1 tab(s) orally once a day  metoprolol tartrate 25 mg oral tablet: 1 tab(s) orally 2 times a day      Vital Signs:   T(F): 96.6 (01-18-20 @ 06:18), Max: 99.9 (01-17-20 @ 16:45)  HR: 60 (01-18-20 @ 11:33) (54 - 67)  BP: 135/63 (01-18-20 @ 11:33) (130/61 - 194/108)  RR: 18 (01-18-20 @ 11:33) (17 - 30)  SpO2: 97% (01-18-20 @ 09:19) (93% - 100%)      01-17-20 @ 07:01  -  01-18-20 @ 07:00  --------------------------------------------------------  IN: 0 mL / OUT: 250 mL / NET: -250 mL    01-18-20 @ 07:01  -  01-18-20 @ 11:47  --------------------------------------------------------  IN: 320 mL / OUT: 600 mL / NET: -280 mL        Physical Exam:   	GENERAL: NAD, AAO x 4, o 2L NC  	HEAD:  Atraumatic, Normocephalic  	EYES: EOMI, conjunctiva clear and sclera white  	NECK: Supple, No JVD  	CHEST/LUNG: Lung crackles R in the upper and lower side, left basal crackles  	HEART/CVS: Regular rate and rhythm; No murmurs;   	ABDOMEN/GI: Soft, Nontender, Nondistended; Bowel sounds present; No guarding  	EXTREMITIES:  no lower limb edema  .  NEUROLOGY: non-focal      Labs:                         10.3   6.59  )-----------( 190      ( 18 Jan 2020 05:58 )             33.1     Neutophil% 86.4<H>, Lymphocyte% 7.6<L>, Monocyte% 5.3, Bands% 0.5<H> 01-18 18 Jan 2020 05:58    140    |  100    |  18     ----------------------------<  126    3.9     |  26     |  0.9      Ca    9.3        18 Jan 2020 05:58  Phos  4.9       18 Jan 2020 05:58  Mg     2.0       18 Jan 2020 05:58    TPro  7.5    /  Alb  3.8    /  TBili  0.7    /  DBili  x      /  AST  18     /  ALT  17     /  AlkPhos  67     18 Jan 2020 05:58      Chol 177, LDL 96, HDL 79, VLDL --, TRG 37 01-18      Serum Pro-Brain Natriuretic Peptide: 881 <H> (01-17)    Troponin <0.01, CKMB --, CK -- 01-18, Troponin <0.01, CKMB --, CK -- 01-18, Troponin <0.01, CKMB --, CK -- 01-17                LIVER FUNCTIONS - ( 18 Jan 2020 05:58 )  Alb: 3.8 g/dL / Pro: 7.5 g/dL / ALK PHOS: 67 U/L / ALT: 17 U/L / AST: 18 U/L / GGT: x

## 2020-01-18 NOTE — PROGRESS NOTE ADULT - ASSESSMENT
80 yo F w/ PMH of GIB, PUD, diverticulosis, hemorrhoids, HTN, COPD, severe MR, HFpEF, hypothyroidism presents to ED complaining of shortness of breath.     # GNR pneumonia, unlikely HFpEf excerbation or PE  -pt is only having non productive cough, social smoker, denied any weight loss or night sweats  -no fever or elevated WBC  -cxr showing infiltrates ight upper lobe and right lower lobe. Increased markings are noted in the left upper lobe   - flu swab f/u result, ild leukopenia can be secondary to viral infection  -will start on vancomycin and Rocephin, MRSA swab sent, if negative d/c vanco  -pulmonary consulted right side infiltrate  - BNP elevated 881  - will stop lasix for now, monitor volume status, pt is euvolemic now  - repeat ECHO, echo from august was showing diastolic dysfunction and sever MR  - Troponin negative      # h/o COPD  - stable, Nebs PRN  - supplement oxygen PRN, monitor pulse Ox   -no steroids, pt not in exacerbation    # HTN  - c/w home medications     # h/o MR  - monitor for volume overload    # Hypothyroid  - c/w Synthroid     Diet: DASH diet w/ fluid restriction  GI ppx: Protonix qD  DVT ppx: Lovenox 40 mg qD  Activity: Increase  as tolerated  Code status: Full Code  Disposition: from home, MRSA swab, monitor for improvement, pulmonary consult, flue swab 78 yo F w/ PMH of GIB, PUD, diverticulosis, hemorrhoids, HTN, COPD, severe MR, HFpEF, hypothyroidism presents to ED complaining of shortness of breath.     # GNR pneumonia, unlikely HFpEf exacerbation or PE, can be malignant in etiology  -pt is only having non productive cough, social smoker, denied any weight loss or night sweats  -no fever or elevated WBC  -cxr showing infiltrates ight upper lobe and right lower lobe. Increased markings are noted in the left upper lobe   - flu swab f/u result, ild leukopenia can be secondary to viral infection  -will start on vancomycin and Rocephin, MRSA swab sent, if negative d/c vanco  -pulmonary consulted right side infiltrate  - BNP elevated 881  - will stop lasix for now, monitor volume status, pt is euvolemic now  - repeat ECHO, echo from august was showing diastolic dysfunction and sever MR  - Troponin negative  -CT chest ordered    # h/o COPD  - stable, Nebs PRN  - supplement oxygen PRN, monitor pulse Ox   -no steroids, pt not in exacerbation    # HTN  - c/w home medications     # h/o MR  - monitor for volume overload    # Hypothyroid  - c/w Synthroid     Diet: DASH diet w/ fluid restriction  GI ppx: Protonix qD  DVT ppx: Lovenox 40 mg qD  Activity: Increase  as tolerated  Code status: Full Code  Disposition: from home, MRSA swab, monitor for improvement, pulmonary consult, flue swab, CT chest f/u 78 yo F w/ PMH of GIB, PUD, diverticulosis, hemorrhoids, HTN, COPD, severe MR, HFpEF, hypothyroidism presents to ED complaining of shortness of breath.     # GNR pneumonia, unlikely HFpEf exacerbation or PE, can be malignant in etiology  -pt is only having non productive cough, social smoker, denied any weight loss or night sweats  -no fever or elevated WBC  -cxr showing infiltrates ight upper lobe and right lower lobe. Increased markings are noted in the left upper lobe   - flu swab f/u result, ild leukopenia can be secondary to viral infection  -will start on vancomycin and Rocephin, MRSA swab sent, if negative d/c vanco  -pulmonary consulted right side infiltrate  - BNP elevated 881  - will stop lasix for now, monitor volume status, pt is euvolemic now  - repeat ECHO, echo from august was showing diastolic dysfunction and sever MR  - Troponin negative  -CT chest ordered    # h/o COPD  - stable, Nebs PRN  - supplement oxygen PRN, monitor pulse Ox   -no steroids, pt not in exacerbation    # HTN  - c/w home medications     # h/o MR  - monitor for volume overload    # Hypothyroid  - c/w Synthroid     Diet: DASH diet w/ fluid restriction  GI ppx: Protonix qD  DVT ppx: Lovenox 40 mg qD  Activity: Increase  as tolerated  Code status: Full Code  Disposition: from home, MRSA swab, monitor for improvement, pulmonary consult, rvp panel , CT chest f/u

## 2020-01-19 LAB
ALBUMIN SERPL ELPH-MCNC: 3.6 G/DL — SIGNIFICANT CHANGE UP (ref 3.5–5.2)
ALP SERPL-CCNC: 59 U/L — SIGNIFICANT CHANGE UP (ref 30–115)
ALT FLD-CCNC: 14 U/L — SIGNIFICANT CHANGE UP (ref 0–41)
ANION GAP SERPL CALC-SCNC: 14 MMOL/L — SIGNIFICANT CHANGE UP (ref 7–14)
AST SERPL-CCNC: 16 U/L — SIGNIFICANT CHANGE UP (ref 0–41)
BASOPHILS # BLD AUTO: 0.04 K/UL — SIGNIFICANT CHANGE UP (ref 0–0.2)
BASOPHILS NFR BLD AUTO: 0.6 % — SIGNIFICANT CHANGE UP (ref 0–1)
BILIRUB SERPL-MCNC: 0.4 MG/DL — SIGNIFICANT CHANGE UP (ref 0.2–1.2)
BUN SERPL-MCNC: 20 MG/DL — SIGNIFICANT CHANGE UP (ref 10–20)
CALCIUM SERPL-MCNC: 8.6 MG/DL — SIGNIFICANT CHANGE UP (ref 8.5–10.1)
CHLORIDE SERPL-SCNC: 99 MMOL/L — SIGNIFICANT CHANGE UP (ref 98–110)
CO2 SERPL-SCNC: 25 MMOL/L — SIGNIFICANT CHANGE UP (ref 17–32)
CREAT SERPL-MCNC: 0.9 MG/DL — SIGNIFICANT CHANGE UP (ref 0.7–1.5)
EOSINOPHIL # BLD AUTO: 0.06 K/UL — SIGNIFICANT CHANGE UP (ref 0–0.7)
EOSINOPHIL NFR BLD AUTO: 0.9 % — SIGNIFICANT CHANGE UP (ref 0–8)
GLUCOSE SERPL-MCNC: 161 MG/DL — HIGH (ref 70–99)
HCT VFR BLD CALC: 30.2 % — LOW (ref 37–47)
HGB BLD-MCNC: 9.3 G/DL — LOW (ref 12–16)
IMM GRANULOCYTES NFR BLD AUTO: 0.3 % — SIGNIFICANT CHANGE UP (ref 0.1–0.3)
LYMPHOCYTES # BLD AUTO: 1.89 K/UL — SIGNIFICANT CHANGE UP (ref 1.2–3.4)
LYMPHOCYTES # BLD AUTO: 28.7 % — SIGNIFICANT CHANGE UP (ref 20.5–51.1)
MAGNESIUM SERPL-MCNC: 2 MG/DL — SIGNIFICANT CHANGE UP (ref 1.8–2.4)
MCHC RBC-ENTMCNC: 29.4 PG — SIGNIFICANT CHANGE UP (ref 27–31)
MCHC RBC-ENTMCNC: 30.8 G/DL — LOW (ref 32–37)
MCV RBC AUTO: 95.6 FL — SIGNIFICANT CHANGE UP (ref 81–99)
MONOCYTES # BLD AUTO: 0.69 K/UL — HIGH (ref 0.1–0.6)
MONOCYTES NFR BLD AUTO: 10.5 % — HIGH (ref 1.7–9.3)
NEUTROPHILS # BLD AUTO: 3.88 K/UL — SIGNIFICANT CHANGE UP (ref 1.4–6.5)
NEUTROPHILS NFR BLD AUTO: 59 % — SIGNIFICANT CHANGE UP (ref 42.2–75.2)
NRBC # BLD: 0 /100 WBCS — SIGNIFICANT CHANGE UP (ref 0–0)
PLATELET # BLD AUTO: 178 K/UL — SIGNIFICANT CHANGE UP (ref 130–400)
POTASSIUM SERPL-MCNC: 3.5 MMOL/L — SIGNIFICANT CHANGE UP (ref 3.5–5)
POTASSIUM SERPL-SCNC: 3.5 MMOL/L — SIGNIFICANT CHANGE UP (ref 3.5–5)
PROT SERPL-MCNC: 6.8 G/DL — SIGNIFICANT CHANGE UP (ref 6–8)
RAPID RVP RESULT: SIGNIFICANT CHANGE UP
RBC # BLD: 3.16 M/UL — LOW (ref 4.2–5.4)
RBC # FLD: 14.6 % — HIGH (ref 11.5–14.5)
SODIUM SERPL-SCNC: 138 MMOL/L — SIGNIFICANT CHANGE UP (ref 135–146)
WBC # BLD: 6.58 K/UL — SIGNIFICANT CHANGE UP (ref 4.8–10.8)
WBC # FLD AUTO: 6.58 K/UL — SIGNIFICANT CHANGE UP (ref 4.8–10.8)

## 2020-01-19 PROCEDURE — 99233 SBSQ HOSP IP/OBS HIGH 50: CPT

## 2020-01-19 PROCEDURE — 93306 TTE W/DOPPLER COMPLETE: CPT | Mod: 26

## 2020-01-19 RX ORDER — AZITHROMYCIN 500 MG/1
500 TABLET, FILM COATED ORAL ONCE
Refills: 0 | Status: COMPLETED | OUTPATIENT
Start: 2020-01-19 | End: 2020-01-19

## 2020-01-19 RX ORDER — AZITHROMYCIN 500 MG/1
500 TABLET, FILM COATED ORAL EVERY 24 HOURS
Refills: 0 | Status: DISCONTINUED | OUTPATIENT
Start: 2020-01-20 | End: 2020-01-21

## 2020-01-19 RX ORDER — SENNA PLUS 8.6 MG/1
2 TABLET ORAL AT BEDTIME
Refills: 0 | Status: DISCONTINUED | OUTPATIENT
Start: 2020-01-19 | End: 2020-01-21

## 2020-01-19 RX ORDER — AZITHROMYCIN 500 MG/1
TABLET, FILM COATED ORAL
Refills: 0 | Status: DISCONTINUED | OUTPATIENT
Start: 2020-01-19 | End: 2020-01-21

## 2020-01-19 RX ADMIN — AZITHROMYCIN 255 MILLIGRAM(S): 500 TABLET, FILM COATED ORAL at 11:09

## 2020-01-19 RX ADMIN — Medication 25 MILLIGRAM(S): at 05:44

## 2020-01-19 RX ADMIN — Medication 250 MILLIGRAM(S): at 05:44

## 2020-01-19 RX ADMIN — ENOXAPARIN SODIUM 40 MILLIGRAM(S): 100 INJECTION SUBCUTANEOUS at 21:23

## 2020-01-19 RX ADMIN — Medication 25 MILLIGRAM(S): at 18:20

## 2020-01-19 RX ADMIN — Medication 25 MICROGRAM(S): at 05:44

## 2020-01-19 RX ADMIN — AMLODIPINE BESYLATE 5 MILLIGRAM(S): 2.5 TABLET ORAL at 05:44

## 2020-01-19 RX ADMIN — PANTOPRAZOLE SODIUM 40 MILLIGRAM(S): 20 TABLET, DELAYED RELEASE ORAL at 05:44

## 2020-01-19 RX ADMIN — ATORVASTATIN CALCIUM 20 MILLIGRAM(S): 80 TABLET, FILM COATED ORAL at 21:23

## 2020-01-19 RX ADMIN — ALBUTEROL 1 PUFF(S): 90 AEROSOL, METERED ORAL at 06:05

## 2020-01-19 RX ADMIN — Medication 10 MILLIGRAM(S): at 05:44

## 2020-01-19 RX ADMIN — CEFTRIAXONE 100 MILLIGRAM(S): 500 INJECTION, POWDER, FOR SOLUTION INTRAMUSCULAR; INTRAVENOUS at 12:47

## 2020-01-19 RX ADMIN — Medication 1 TABLET(S): at 11:08

## 2020-01-19 RX ADMIN — Medication 3 MILLILITER(S): at 14:17

## 2020-01-19 RX ADMIN — SENNA PLUS 2 TABLET(S): 8.6 TABLET ORAL at 21:23

## 2020-01-19 NOTE — PROGRESS NOTE ADULT - SUBJECTIVE AND OBJECTIVE BOX
W[place], SCOTTY  79y Female    CHIEF COMPLAINT:    Patient is a 79y old  Female who presents with a chief complaint of SOB (2020 11:47)      INTERVAL HPI/OVERNIGHT EVENTS:    Patient seen and examined.    ROS: All other systems are negative.    Vital Signs:    T(F): 96.6 (20 @ 07:55), Max: 97.7 (20 @ 00:01)  HR: 56 (20 @ 07:55) (56 - 73)  BP: 124/58 (20 @ 07:55) (124/58 - 150/72)  RR: 18 (20 @ 05:42) (18 - 18)  SpO2: 100% (20 @ 05:42) (97% - 100%)  I&O's Summary    2020 07:01  -  2020 07:00  --------------------------------------------------------  IN: 560 mL / OUT: 600 mL / NET: -40 mL      Daily     Daily Weight in k.3 (2020 05:42)  CAPILLARY BLOOD GLUCOSE          PHYSICAL EXAM:    GENERAL:  NAD  SKIN: No rashes or lesions  HENT: Atrumatic. Normocephalic. PERRL. Moist membranes.  NECK: Supple, No JVD. No lymphadenopathy.  PULMONARY: CTA B/L. No wheezing. No rales  CVS: Normal S1, S2. Rate and Rythm are regular. No murmurs.  ABDOMEN/GI: Soft, Nontender, Nondistended; BS present  EXTREMITIES: Peripheral pulses intact. No edema B/L LE.  NEUROLOGIC:  No motor or sensory deficit.  PSYCH: Alert & oriented x 3    Consultant(s) Notes Reviewed:  [x ] YES  [ ] NO  Care Discussed with Consultants/Other Providers [ x] YES  [ ] NO    EKG reviewed  Telemetry reviewed    LABS:                        9.3    6.58  )-----------( 178      ( 2020 06:43 )             30.2         140  |  100  |  18  ----------------------------<  126<H>  3.9   |  26  |  0.9    Ca    9.3      2020 05:58  Phos  4.9       Mg     2.0         TPro  7.5  /  Alb  3.8  /  TBili  0.7  /  DBili  x   /  AST  18  /  ALT  17  /  AlkPhos  67        Serum Pro-Brain Natriuretic Peptide: 881 pg/mL (20 @ 17:03)    Trop <0.01, CKMB --, CK --, 20 @ 05:58  Trop <0.01, CKMB --, CK --, 20 @ 01:30  Trop <0.01, CKMB --, CK --, 20 @ 17:03        RADIOLOGY & ADDITIONAL TESTS:    < from: CT Chest w/ IV Cont (20 @ 16:34) >      Impression:    Pretracheal lymph node measuring up to 1.5 cm in short axis. Follow-up pulmonary for possible further evaluation.    Diffuse groundglass opacities, right worse on left.    Stable, since , 6 mm nodule in the right upper lobe      < end of copied text >    Imaging or report Personally Reviewed:  [ ] YES  [ ] NO    Medications:  Standing  albuterol/ipratropium for Nebulization 3 milliLiter(s) Nebulizer every 6 hours  amLODIPine   Tablet 5 milliGRAM(s) Oral daily  atorvastatin 20 milliGRAM(s) Oral at bedtime  azithromycin  IVPB      cefTRIAXone   IVPB 1000 milliGRAM(s) IV Intermittent every 24 hours  enalapril 10 milliGRAM(s) Oral daily  enoxaparin Injectable 40 milliGRAM(s) SubCutaneous at bedtime  levothyroxine 25 MICROGram(s) Oral daily  metoprolol tartrate 25 milliGRAM(s) Oral two times a day  multivitamin 1 Tablet(s) Oral daily  pantoprazole    Tablet 40 milliGRAM(s) Oral before breakfast    PRN Meds  ALBUTerol    90 MICROgram(s) HFA Inhaler 1 Puff(s) Inhalation every 4 hours PRN      Case discussed with resident    Care discussed with pt/family SCOTTY HANNON  79y Female    CHIEF COMPLAINT:    Patient is a 79y old  Female who presents with a chief complaint of SOB (2020 11:47)      INTERVAL HPI/OVERNIGHT EVENTS:    Patient seen and examined. No sob. RA pox was good. C/O cough. No fever    ROS: All other systems are negative.    Vital Signs:    T(F): 96.6 (20 @ 07:55), Max: 97.7 (20 @ 00:01)  HR: 56 (20 @ 07:55) (56 - 73)  BP: 124/58 (20 @ 07:55) (124/58 - 150/72)  RR: 18 (20 @ 05:42) (18 - 18)  SpO2: 100% (20 @ 05:42) (97% - 100%)  I&O's Summary    2020 07:01  -  2020 07:00  --------------------------------------------------------  IN: 560 mL / OUT: 600 mL / NET: -40 mL      Daily     Daily Weight in k.3 (2020 05:42)  CAPILLARY BLOOD GLUCOSE          PHYSICAL EXAM:    GENERAL:  NAD  SKIN: No rashes or lesions  HENT: Atraumatic Normocephalic. PERRL. Moist membranes.  NECK: Supple, No JVD. No lymphadenopathy.  PULMONARY: +ve rales in the Rt lower chest post  CVS: Normal S1, S2. Rate and Rhythm are regular. No murmurs.  ABDOMEN/GI: Soft, Nontender, Nondistended; BS present  EXTREMITIES: Peripheral pulses intact. No edema B/L LE.  NEUROLOGIC:  No motor or sensory deficit.  PSYCH: Alert & oriented x 3    Consultant(s) Notes Reviewed:  [x ] YES  [ ] NO  Care Discussed with Consultants/Other Providers [ x] YES  [ ] NO    EKG reviewed  Telemetry reviewed    LABS:                        9.3    6.58  )-----------( 178      ( 2020 06:43 )             30.2     -    140  |  100  |  18  ----------------------------<  126<H>  3.9   |  26  |  0.9    Ca    9.3      2020 05:58  Phos  4.9       Mg     2.0         TPro  7.5  /  Alb  3.8  /  TBili  0.7  /  DBili  x   /  AST  18  /  ALT  17  /  AlkPhos  67        Serum Pro-Brain Natriuretic Peptide: 881 pg/mL (20 @ 17:03)    Trop <0.01, CKMB --, CK --, 20 @ 05:58  Trop <0.01, CKMB --, CK --, 20 @ 01:30  Trop <0.01, CKMB --, CK --, 20 @ 17:03        RADIOLOGY & ADDITIONAL TESTS:    < from: CT Chest w/ IV Cont (20 @ 16:34) >      Impression:    Pretracheal lymph node measuring up to 1.5 cm in short axis. Follow-up pulmonary for possible further evaluation.    Diffuse groundglass opacities, right worse on left.    Stable, since , 6 mm nodule in the right upper lobe      < end of copied text >    Imaging or report Personally Reviewed:  [ ] YES  [ ] NO    Medications:  Standing  albuterol/ipratropium for Nebulization 3 milliLiter(s) Nebulizer every 6 hours  amLODIPine   Tablet 5 milliGRAM(s) Oral daily  atorvastatin 20 milliGRAM(s) Oral at bedtime  azithromycin  IVPB      cefTRIAXone   IVPB 1000 milliGRAM(s) IV Intermittent every 24 hours  enalapril 10 milliGRAM(s) Oral daily  enoxaparin Injectable 40 milliGRAM(s) SubCutaneous at bedtime  levothyroxine 25 MICROGram(s) Oral daily  metoprolol tartrate 25 milliGRAM(s) Oral two times a day  multivitamin 1 Tablet(s) Oral daily  pantoprazole    Tablet 40 milliGRAM(s) Oral before breakfast    PRN Meds  ALBUTerol    90 MICROgram(s) HFA Inhaler 1 Puff(s) Inhalation every 4 hours PRN      Case discussed with resident    Care discussed with pt/family

## 2020-01-19 NOTE — PROGRESS NOTE ADULT - SUBJECTIVE AND OBJECTIVE BOX
*This is a Preliminary Note and will be edited*  SUBJECTIVE:  Patient is a 79y old Female who presents with a chief complaint of SOB (19 Jan 2020 08:02)     Currently admitted to medicine with the primary diagnosis of CHF (congestive heart failure)     Today is hospital day 2d. This morning she is resting comfortably in bed and reports no new** issues or overnight events.   Patient denies Headache, Chest pain, Abdominal Pain or discomfort.**  Patient reports urinating and stooling appropriately.**    PAST MEDICAL & SURGICAL HISTORY  Other specified hypothyroidism  Severe mitral regurgitation  COPD (chronic obstructive pulmonary disease)  CHF (congestive heart failure)  HTN (hypertension)  History of cholecystectomy    SOCIAL HISTORY:  Negative for smoking/alcohol/drug use.     ALLERGIES:  No Known Allergies    MEDICATIONS:  STANDING MEDICATIONS  ALBUTerol    90 MICROgram(s) HFA Inhaler 1 Puff(s) Inhalation every 4 hours PRN Bronchospasm  albuterol/ipratropium for Nebulization 3 milliLiter(s) Nebulizer every 6 hours  amLODIPine   Tablet 5 milliGRAM(s) Oral daily  atorvastatin 20 milliGRAM(s) Oral at bedtime  azithromycin  IVPB      cefTRIAXone   IVPB 1000 milliGRAM(s) IV Intermittent every 24 hours  enalapril 10 milliGRAM(s) Oral daily  enoxaparin Injectable 40 milliGRAM(s) SubCutaneous at bedtime  levothyroxine 25 MICROGram(s) Oral daily  metoprolol tartrate 25 milliGRAM(s) Oral two times a day  multivitamin 1 Tablet(s) Oral daily  pantoprazole    Tablet 40 milliGRAM(s) Oral before breakfast    PRN MEDICATIONS  ALBUTerol    90 MICROgram(s) HFA Inhaler 1 Puff(s) Inhalation every 4 hours PRN    VITALS:   T(F): 96.6  HR: 56 (56 - 73)  BP: 124/58 (124/58 - 150/72)  RR: 18 (18 - 18)  SpO2: 96% (96% - 100%)    LABS:                        9.3    6.58  )-----------( 178      ( 19 Jan 2020 06:43 )             30.2     01-19    138  |  99  |  20  ----------------------------<  161<H>  3.5   |  25  |  0.9    Ca    8.6      19 Jan 2020 06:43  Phos  4.9     01-18  Mg     2.0     01-19    TPro  6.8  /  Alb  3.6  /  TBili  0.4  /  DBili  x   /  AST  16  /  ALT  14  /  AlkPhos  59  01-19        CARDIAC MARKERS ( 18 Jan 2020 05:58 )  x     / <0.01 ng/mL / x     / x     / x      CARDIAC MARKERS ( 18 Jan 2020 01:30 )  x     / <0.01 ng/mL / x     / x     / x      CARDIAC MARKERS ( 17 Jan 2020 17:03 )  x     / <0.01 ng/mL / x     / x     / x          Troponin T, Serum: <0.01 ng/mL (01-18-20 @ 05:58)  Troponin T, Serum: <0.01 ng/mL (01-18-20 @ 01:30)  Troponin T, Serum: <0.01 ng/mL (01-17-20 @ 17:03)  Serum Pro-Brain Natriuretic Peptide: 881 pg/mL (01-17-20 @ 17:03)      RADIOLOGY:  < from: CT Chest w/ IV Cont (01.18.20 @ 16:34) >  Impression:  Pretracheal lymph node measuring up to 1.5 cm in short axis. Follow-up pulmonary for possible further evaluation.  Diffuse groundglass opacities, right worse on left.  Stable, since 2015, 6 mm nodule in the right upper lobe  < end of copied text >    PHYSICAL EXAM:  GEN: In no acute distress. Patient is awake in bed able to have a conversation.  LUNGS: CTABL, Symmetrical inspiration, no increased work of breathing  HEART: +S1,S2, regular rate and rhythm, No murmurs, Rubs, Gallops appreciated  ABD: Bowel Sounds Present, Soft, non tender, non distended, no guarding, no rebound.   EXT: 2+ peripheral Pulses, no clubbing, no cyanosis, no Edema.   NEURO: Alert and Oriented X3. No focal deficits Appreciated. Cranial Nerves 2-12 Grossly intact. SUBJECTIVE:  Patient is a 79y old Female who presents with a chief complaint of SOB (19 Jan 2020 08:02)     Currently admitted to medicine with the primary diagnosis of CHF (congestive heart failure)     Today is hospital day 2d. This morning she is resting comfortably in bed and reports no new issues or overnight events.   Patient denies Headache, Chest pain, Abdominal Pain or discomfort.  Patient reports urinating appropriately. Patient Reports not having passed a stool. Reports not seeing blood in last stool .    PAST MEDICAL & SURGICAL HISTORY  Other specified hypothyroidism  Severe mitral regurgitation  COPD (chronic obstructive pulmonary disease)  CHF (congestive heart failure)  HTN (hypertension)  History of cholecystectomy    SOCIAL HISTORY:  Negative for smoking/alcohol/drug use.     ALLERGIES:  No Known Allergies    MEDICATIONS:  STANDING MEDICATIONS  ALBUTerol    90 MICROgram(s) HFA Inhaler 1 Puff(s) Inhalation every 4 hours PRN Bronchospasm  albuterol/ipratropium for Nebulization 3 milliLiter(s) Nebulizer every 6 hours  amLODIPine   Tablet 5 milliGRAM(s) Oral daily  atorvastatin 20 milliGRAM(s) Oral at bedtime  azithromycin  IVPB      cefTRIAXone   IVPB 1000 milliGRAM(s) IV Intermittent every 24 hours  enalapril 10 milliGRAM(s) Oral daily  enoxaparin Injectable 40 milliGRAM(s) SubCutaneous at bedtime  levothyroxine 25 MICROGram(s) Oral daily  metoprolol tartrate 25 milliGRAM(s) Oral two times a day  multivitamin 1 Tablet(s) Oral daily  pantoprazole    Tablet 40 milliGRAM(s) Oral before breakfast    PRN MEDICATIONS  ALBUTerol    90 MICROgram(s) HFA Inhaler 1 Puff(s) Inhalation every 4 hours PRN    VITALS:   T(F): 96.6  HR: 56 (56 - 73)  BP: 124/58 (124/58 - 150/72)  RR: 18 (18 - 18)  SpO2: 96% (96% - 100%)    LABS:                        9.3    6.58  )-----------( 178      ( 19 Jan 2020 06:43 )             30.2     01-19    138  |  99  |  20  ----------------------------<  161<H>  3.5   |  25  |  0.9    Ca    8.6      19 Jan 2020 06:43  Phos  4.9     01-18  Mg     2.0     01-19    TPro  6.8  /  Alb  3.6  /  TBili  0.4  /  DBili  x   /  AST  16  /  ALT  14  /  AlkPhos  59  01-19        CARDIAC MARKERS ( 18 Jan 2020 05:58 )  x     / <0.01 ng/mL / x     / x     / x      CARDIAC MARKERS ( 18 Jan 2020 01:30 )  x     / <0.01 ng/mL / x     / x     / x      CARDIAC MARKERS ( 17 Jan 2020 17:03 )  x     / <0.01 ng/mL / x     / x     / x          Troponin T, Serum: <0.01 ng/mL (01-18-20 @ 05:58)  Troponin T, Serum: <0.01 ng/mL (01-18-20 @ 01:30)  Troponin T, Serum: <0.01 ng/mL (01-17-20 @ 17:03)  Serum Pro-Brain Natriuretic Peptide: 881 pg/mL (01-17-20 @ 17:03)      RADIOLOGY:  < from: CT Chest w/ IV Cont (01.18.20 @ 16:34) >  Impression:  Pretracheal lymph node measuring up to 1.5 cm in short axis. Follow-up pulmonary for possible further evaluation.  Diffuse groundglass opacities, right worse on left.  Stable, since 2015, 6 mm nodule in the right upper lobe  < end of copied text >    PHYSICAL EXAM:  GEN: In no acute distress. Patient is awake in bed able to have a conversation.  LUNGS: CTABL, Symmetrical inspiration, no increased work of breathing  HEART: +S1,S2, regular rate and rhythm, No murmurs, Rubs, Gallops appreciated  ABD: Bowel Sounds Present, Soft, non tender, non distended, no guarding, no rebound.   EXT: 2+ peripheral Pulses, no clubbing, no cyanosis, no Edema. MOves all extremities/   BACK: Sub Cutaneous Nodule Soft Round Movable  : No blood on Underwear or around anus - Patient refusing CACHORRO.  NEURO: Alert and Oriented X3. No focal deficits Appreciated. Cranial Nerves 2-12 Grossly intact.

## 2020-01-19 NOTE — PROGRESS NOTE ADULT - ASSESSMENT
78 yo F w/ PMH of GIB, PUD, diverticulosis, hemorrhoids, HTN, COPD, severe MR, HFpEF, hypothyroidism presents to ED complaining of shortness of breath.     # GNR pneumonia, unlikely HFpEf, exacerbation or PE, can be malignant in etiology  - pt is only having non productive cough, social smoker, denied any weight loss or night sweats  - no fever or elevated WBC  - CXR - Improving but persistent Opacities  - CT - Ground Glass opacities - R worse than Left - Stable 6 mm nodule   - flu swab - Negative -   - MRSA Neg - Dc Vancomycin  - Continue with Rocephin  - Start Azithro   - pulmonary consulted right side infiltrate  - BNP elevated 881  - will stop lasix for now, monitor volume status, pt is euvolemic now  - repeat ECHO,  - Performed - F/u read  - Troponin negative    # Anemia, Normocytic  - No source identified  - Hemoglobin - 9.3(<10.3<11.2 admission)  - Monitor, Keep TnS active    # h/o COPD  - stable, Nebs PRN  - supplement oxygen PRN, monitor pulse Ox   - no steroids, pt not in exacerbation    # HTN - Continue with home medications   # h/o MR - monitor for volume overload  # Hypothyroid - Continue with Synthroid     Diet: DASH diet w/ fluid restriction  GI ppx: Protonix qD  DVT ppx: Lovenox 40 mg qD  Activity: Increase  as tolerated  Code status: Full Code  Disposition: from home, MRSA swab, monitor for improvement, pulmonary consult, rvp panel , CT chest f/u 80 yo F w/ PMH of GIB, PUD, diverticulosis, hemorrhoids, HTN, COPD, severe MR, HFpEF, hypothyroidism presents to ED complaining of shortness of breath.     # GNR pneumonia, unlikely HFpEf, exacerbation or PE, can be malignant in etiology  - pt is only having non productive cough, social smoker, denied any weight loss or night sweats  - no fever or elevated WBC  - CXR - Improving but persistent Opacities  - CT - Ground Glass opacities - R worse than Left - Stable 6 mm nodule   - flu swab - Negative -   - MRSA Neg - Dc Vancomycin  - Continue with Rocephin  - Start Azithro   - Pulmonary consult Placed - Follow up   - BNP elevated 881  - will stop lasix for now, monitor volume status, pt is euvolemic now  - repeat ECHO,  - Performed - F/u read  - Troponin negative    # Anemia, Normocytic  - No source identified  - Hemoglobin - 9.3(<10.3<11.2 admission)  - Monitor, Keep TnS active    # h/o COPD  - stable, Nebs PRN  - supplement oxygen PRN, monitor pulse Ox   - no steroids, pt not in exacerbation    # HTN - Continue with home medications   # h/o MR - monitor for volume overload  # Hypothyroid - Continue with Synthroid     Diet: DASH diet w/ fluid restriction  GI ppx: Protonix qD  DVT ppx: Lovenox 40 mg qD  Activity: Increase  as tolerated  Code status: Full Code  Disposition: from home, monitor for improvement, pulmonary consult,

## 2020-01-19 NOTE — PROGRESS NOTE ADULT - ASSESSMENT
A 79 years old female presented to the ED complaining that she has dry cough for the last one week. Pt states that yesterday when she went outside in cold air she became short of breath and had chest tightness. No fever, cold or chills.    CXR: Infiltrates on the Rt side of the chest  WBC: 6.59  BNP: 881  CE x 3 negative  EKG: Sinus bradycardia @ 52/min. LAD. LVH. (Interpreted by me.)    O/E: Crackles in the Rt. Middle/lateral chest. No wheezing.       1. SOB  2. Possible PNA - GNR  3. H/O COPD  4. Severe MR  5. HTN  6. H/O GIB / PUD  7. Ac. Hypoxic / Hypercapnic respiratory failure  8. H/O Ch. HFpEF (No acute CHF)         PLAN:    ·	Virus panel is negative  ·	MRSA PCR is negative. D/C Vanco  ·	Cont IV Rocephin and add Zithromax  ·	CT chest reviewed. B/L ground glass opacities and 1.5 cm pretracheal LN enlargement    . Pulmonary eval  . Check cxs  . Switch her back to Lasix 40 mg po daily  . Cont Nebs  . Can D/C tele . A 79 years old female presented to the ED complaining that she has dry cough for the last one week. Pt states that yesterday when she went outside in cold air she became short of breath and had chest tightness. No fever, cold or chills.    CXR: Infiltrates on the Rt side of the chest  WBC: 6.59  BNP: 881  CE x 3 negative  EKG: Sinus bradycardia @ 52/min. LAD. LVH. (Interpreted by me.)    O/E: Crackles in the Rt. Middle/lateral chest. No wheezing.       1. SOB  2. Possible PNA - GNR  3. H/O COPD  4. Severe MR  5. HTN  6. H/O GIB / PUD  7. Ac. Hypoxic / Hypercapnic respiratory failure  8. H/O Ch. HFpEF (No acute CHF)         PLAN:    ·	Virus panel is negative  ·	MRSA PCR is negative. D/C Vanco  ·	Cont IV Rocephin and add Zithromax  ·	CT chest reviewed. B/L ground glass opacities and 1.5 cm pretracheal LN enlargement  ·	Pulmonary eval for abnormal findings on CT chest  ·	Cont Lasix 40 mg po daily  ·	Check cxs  ·	Cont Nebs

## 2020-01-20 LAB
ALBUMIN SERPL ELPH-MCNC: 3.6 G/DL — SIGNIFICANT CHANGE UP (ref 3.5–5.2)
ALP SERPL-CCNC: 59 U/L — SIGNIFICANT CHANGE UP (ref 30–115)
ALT FLD-CCNC: 16 U/L — SIGNIFICANT CHANGE UP (ref 0–41)
ANION GAP SERPL CALC-SCNC: 9 MMOL/L — SIGNIFICANT CHANGE UP (ref 7–14)
AST SERPL-CCNC: 19 U/L — SIGNIFICANT CHANGE UP (ref 0–41)
BASOPHILS # BLD AUTO: 0.06 K/UL — SIGNIFICANT CHANGE UP (ref 0–0.2)
BASOPHILS NFR BLD AUTO: 1.2 % — HIGH (ref 0–1)
BILIRUB SERPL-MCNC: 0.3 MG/DL — SIGNIFICANT CHANGE UP (ref 0.2–1.2)
BUN SERPL-MCNC: 14 MG/DL — SIGNIFICANT CHANGE UP (ref 10–20)
CALCIUM SERPL-MCNC: 8.7 MG/DL — SIGNIFICANT CHANGE UP (ref 8.5–10.1)
CHLORIDE SERPL-SCNC: 103 MMOL/L — SIGNIFICANT CHANGE UP (ref 98–110)
CO2 SERPL-SCNC: 27 MMOL/L — SIGNIFICANT CHANGE UP (ref 17–32)
CREAT SERPL-MCNC: 0.9 MG/DL — SIGNIFICANT CHANGE UP (ref 0.7–1.5)
EOSINOPHIL # BLD AUTO: 0.18 K/UL — SIGNIFICANT CHANGE UP (ref 0–0.7)
EOSINOPHIL NFR BLD AUTO: 3.6 % — SIGNIFICANT CHANGE UP (ref 0–8)
GLUCOSE SERPL-MCNC: 104 MG/DL — HIGH (ref 70–99)
HCT VFR BLD CALC: 32.3 % — LOW (ref 37–47)
HGB BLD-MCNC: 9.9 G/DL — LOW (ref 12–16)
IMM GRANULOCYTES NFR BLD AUTO: 0.4 % — HIGH (ref 0.1–0.3)
LYMPHOCYTES # BLD AUTO: 1.55 K/UL — SIGNIFICANT CHANGE UP (ref 1.2–3.4)
LYMPHOCYTES # BLD AUTO: 31.4 % — SIGNIFICANT CHANGE UP (ref 20.5–51.1)
MCHC RBC-ENTMCNC: 29.9 PG — SIGNIFICANT CHANGE UP (ref 27–31)
MCHC RBC-ENTMCNC: 30.7 G/DL — LOW (ref 32–37)
MCV RBC AUTO: 97.6 FL — SIGNIFICANT CHANGE UP (ref 81–99)
MONOCYTES # BLD AUTO: 0.7 K/UL — HIGH (ref 0.1–0.6)
MONOCYTES NFR BLD AUTO: 14.2 % — HIGH (ref 1.7–9.3)
NEUTROPHILS # BLD AUTO: 2.43 K/UL — SIGNIFICANT CHANGE UP (ref 1.4–6.5)
NEUTROPHILS NFR BLD AUTO: 49.2 % — SIGNIFICANT CHANGE UP (ref 42.2–75.2)
NRBC # BLD: 0 /100 WBCS — SIGNIFICANT CHANGE UP (ref 0–0)
PLATELET # BLD AUTO: 172 K/UL — SIGNIFICANT CHANGE UP (ref 130–400)
POTASSIUM SERPL-MCNC: 4 MMOL/L — SIGNIFICANT CHANGE UP (ref 3.5–5)
POTASSIUM SERPL-SCNC: 4 MMOL/L — SIGNIFICANT CHANGE UP (ref 3.5–5)
PROT SERPL-MCNC: 6.8 G/DL — SIGNIFICANT CHANGE UP (ref 6–8)
RBC # BLD: 3.31 M/UL — LOW (ref 4.2–5.4)
RBC # FLD: 14.5 % — SIGNIFICANT CHANGE UP (ref 11.5–14.5)
SODIUM SERPL-SCNC: 139 MMOL/L — SIGNIFICANT CHANGE UP (ref 135–146)
WBC # BLD: 4.94 K/UL — SIGNIFICANT CHANGE UP (ref 4.8–10.8)
WBC # FLD AUTO: 4.94 K/UL — SIGNIFICANT CHANGE UP (ref 4.8–10.8)

## 2020-01-20 PROCEDURE — 99233 SBSQ HOSP IP/OBS HIGH 50: CPT

## 2020-01-20 RX ORDER — FUROSEMIDE 40 MG
40 TABLET ORAL DAILY
Refills: 0 | Status: DISCONTINUED | OUTPATIENT
Start: 2020-01-20 | End: 2020-01-21

## 2020-01-20 RX ADMIN — PANTOPRAZOLE SODIUM 40 MILLIGRAM(S): 20 TABLET, DELAYED RELEASE ORAL at 05:16

## 2020-01-20 RX ADMIN — CEFTRIAXONE 100 MILLIGRAM(S): 500 INJECTION, POWDER, FOR SOLUTION INTRAMUSCULAR; INTRAVENOUS at 12:40

## 2020-01-20 RX ADMIN — ENOXAPARIN SODIUM 40 MILLIGRAM(S): 100 INJECTION SUBCUTANEOUS at 21:35

## 2020-01-20 RX ADMIN — Medication 3 MILLILITER(S): at 05:30

## 2020-01-20 RX ADMIN — AZITHROMYCIN 255 MILLIGRAM(S): 500 TABLET, FILM COATED ORAL at 10:03

## 2020-01-20 RX ADMIN — Medication 25 MILLIGRAM(S): at 05:16

## 2020-01-20 RX ADMIN — AMLODIPINE BESYLATE 5 MILLIGRAM(S): 2.5 TABLET ORAL at 05:16

## 2020-01-20 RX ADMIN — Medication 10 MILLIGRAM(S): at 05:16

## 2020-01-20 RX ADMIN — Medication 25 MILLIGRAM(S): at 18:03

## 2020-01-20 RX ADMIN — Medication 3 MILLILITER(S): at 07:51

## 2020-01-20 RX ADMIN — Medication 25 MICROGRAM(S): at 05:16

## 2020-01-20 RX ADMIN — Medication 1 TABLET(S): at 12:40

## 2020-01-20 RX ADMIN — Medication 40 MILLIGRAM(S): at 12:40

## 2020-01-20 RX ADMIN — Medication 3 MILLILITER(S): at 20:40

## 2020-01-20 RX ADMIN — Medication 3 MILLILITER(S): at 14:06

## 2020-01-20 RX ADMIN — ATORVASTATIN CALCIUM 20 MILLIGRAM(S): 80 TABLET, FILM COATED ORAL at 21:34

## 2020-01-20 NOTE — CONSULT NOTE ADULT - ASSESSMENT
IMPRESSION    Pretracheal LN with Ground-glass Opacities - Likely Infectious Etiology vs Cannot R/O Malignancy (low suspicion)   H/O COPD - no Exacerbation  HFpEF ; Severe MR   Goitre ? H/O Hyperthyroidism in past s/p RI Therapy    PLAN     Rocephin and Levaquin  Urine legionella   Urine strep   Keep I<O ; monitor strict Is and Os  Resume Home inhalers  Repeat CT as outpatient   TSH level  Outpatient PFTs   Outpatient Pulmonary follow up IMPRESSION    Pretracheal LN with Ground-glass Opacities - Likely Infectious Etiology vs Cannot R/O Malignancy (low suspicion)   H/O COPD - no Exacerbation  HFpEF ; Severe MR   Goitre ? H/O Hyperthyroidism in past s/p RI Therapy  RUL Lung Nodule (6mm )- Stable since 2015    PLAN     Rocephin and Levaquin  Urine legionella   Urine strep   Keep I<O ; monitor strict Is and Os  Resume Home inhalers  Repeat CT as outpatient   TSH level  Outpatient PFTs   Outpatient Pulmonary follow up IMPRESSION    Pretracheal LN with Ground-glass Opacities   H/O COPD - no Exacerbation  HFpEF ; Severe MR   Goitre ? H/O Hyperthyroidism in past s/p RI Therapy  RUL Lung Nodule (6mm )- Stable since 2015    PLAN     prednisone 40 daily for 5 days 20 for 5 days  lasix po daily  finish course of azithro  cardio eval  Chest ct in 6 weeks  Outpatient PFTs   Outpatient Pulmonary follow up

## 2020-01-20 NOTE — PROGRESS NOTE ADULT - SUBJECTIVE AND OBJECTIVE BOX
HPI  Patient is a 79y old Female who presents with a chief complaint of SOB (19 Jan 2020 12:39)    Currently admitted to medicine with the primary diagnosis of CHF (congestive heart failure)     Today is hospital day 3d.     INTERVAL HPI / OVERNIGHT EVENTS:  Patient was examined and seen at bedside. This morning she is resting comfortably in bed and reports no new issues or overnight events.     ROS: Otherwise unremarkable     PAST MEDICAL & SURGICAL HISTORY  Other specified hypothyroidism  Severe mitral regurgitation  COPD (chronic obstructive pulmonary disease)  CHF (congestive heart failure)  HTN (hypertension)  History of cholecystectomy    ALLERGIES  No Known Allergies    MEDICATIONS  STANDING MEDICATIONS  albuterol/ipratropium for Nebulization 3 milliLiter(s) Nebulizer every 6 hours  amLODIPine   Tablet 5 milliGRAM(s) Oral daily  atorvastatin 20 milliGRAM(s) Oral at bedtime  azithromycin  IVPB      azithromycin  IVPB 500 milliGRAM(s) IV Intermittent every 24 hours  cefTRIAXone   IVPB 1000 milliGRAM(s) IV Intermittent every 24 hours  enalapril 10 milliGRAM(s) Oral daily  enoxaparin Injectable 40 milliGRAM(s) SubCutaneous at bedtime  levothyroxine 25 MICROGram(s) Oral daily  metoprolol tartrate 25 milliGRAM(s) Oral two times a day  multivitamin 1 Tablet(s) Oral daily  pantoprazole    Tablet 40 milliGRAM(s) Oral before breakfast  senna 2 Tablet(s) Oral at bedtime    PRN MEDICATIONS  ALBUTerol    90 MICROgram(s) HFA Inhaler 1 Puff(s) Inhalation every 4 hours PRN    VITALS:  T(F): 97.4  HR: 53  BP: 126/80  RR: 20  SpO2: 97%    PHYSICAL EXAM  GEN: NAD, Resting comfortably in bed  PULM: Clear to auscultation bilaterally, No wheezes  CVS: Regular rate and rhythm, S1-S2, no murmurs  ABD: Soft, non-tender, non-distended, no guarding  EXT: No edema  NEURO: A&Ox3, no focal deficits    LABS                        9.9    4.94  )-----------( 172      ( 20 Jan 2020 05:59 )             32.3     01-20    139  |  103  |  14  ----------------------------<  104<H>  4.0   |  27  |  0.9    Ca    8.7      20 Jan 2020 05:59  Mg     2.0     01-19    TPro  6.8  /  Alb  3.6  /  TBili  0.3  /  DBili  x   /  AST  19  /  ALT  16  /  AlkPhos  59  01-20              Culture - Blood (collected 18 Jan 2020 05:58)  Source: .Blood None  Preliminary Report (19 Jan 2020 18:01):    No growth to date.          RADIOLOGY    < from: Transthoracic Echocardiogram (01.19.20 @ 11:54) >  Summary:   1. Normal global left ventricular systolic function.   2. LV Ejection Fraction by Burton's Method with a biplane EF of 67 %.   3. Mild left ventricular hypertrophy.   4. Mildly increased LV wall thickness.   5. Normal left ventricular internal cavity size.   6. Spectral Doppler shows restrictive pattern of left ventricular myocardial filling (Grade III diastolic dysfunction).   7. Bowing of atrial septum left to rightsuggestd elevated LA pressure.   8. Mild thickening of the anterior and posterior mitral valve leaflets.   9. Severe mitral valve regurgitation.  10. Sclerotic aortic valve with normal opening.  11. Estimated pulmonary artery systolic pressure is 65.7 mmHg assuming a right atrial pressure of 10 mmHg, which is consistent with severe pulmonary hypertension.  12. LA volume Index is 47.4 ml/m² ml/m2.    < end of copied text >

## 2020-01-20 NOTE — CONSULT NOTE ADULT - ATTENDING COMMENTS
patient seen and examined, agree with above, SOB. MARIMAR ROLLE SEVERE MR lasix, steroids feels better op f/up

## 2020-01-20 NOTE — PROGRESS NOTE ADULT - ASSESSMENT
78 yo F w/ PMH of GIB, PUD, diverticulosis, hemorrhoids, HTN, COPD, severe MR, HFpEF, hypothyroidism presents to ED complaining of shortness of breath.     # GNR pneumonia, unlikely HFpEf, exacerbation or PE, can be malignant in etiology  - pt is only having non productive cough, social smoker, denied any weight loss or night sweats  - no fever or elevated WBC  - CXR - Improving but persistent Opacities  - CT - Ground Glass opacities - R worse than Left - Stable 6 mm nodule   - flu swab - Negative   - MRSA Neg - Dc Vancomycin  - Continue with Rocephin  - c/w Azithro   - Pulmonary consult Placed - Follow up   - BNP elevated 881  - will stop lasix for now, monitor volume status, pt is euvolemic now  - repeat ECHO,  - Performed  EF 67%, severe pulmonary HTN, severe MVR, grade 3 diastolic dysfunction  - Troponin negative    # Anemia, Normocytic  - No source identified  - Hemoglobin - 9.3(<10.3<11.2 admission)  - Monitor, Keep TnS active    # h/o COPD  - stable, Nebs PRN  - supplement oxygen PRN, monitor pulse Ox   - no steroids, pt not in exacerbation    # HTN - Continue with home medications   # h/o MR - monitor for volume overload  # Hypothyroid - Continue with Synthroid     Diet: DASH diet w/ fluid restriction  GI ppx: Protonix qD  DVT ppx: Lovenox 40 mg qD  Activity: Increase as tolerated  Code status: Full Code  Disposition: from home, monitor for improvement, pulmonary consult, 80 yo F w/ PMH of GIB, PUD, diverticulosis, hemorrhoids, HTN, COPD, severe MR, HFpEF, hypothyroidism presents to ED complaining of shortness of breath.     # GNR pneumonia, unlikely HFpEf, exacerbation or PE, can be malignant in etiology  - pt is only having non productive cough, social smoker, denied any weight loss or night sweats  - no fever or elevated WBC  - CXR - Improving but persistent Opacities  - CT - Ground Glass opacities - R worse than Left - Stable 6 mm nodule   - flu swab - Negative   - MRSA Neg - Dc Vancomycin  - Continue with Rocephin  - c/w Azithro   - Pulmonary consult Placed - Follow up   - BNP elevated 881  - will stop lasix for now, monitor volume status, pt is euvolemic now  - repeat ECHO,  - Performed  EF 67%, severe pulmonary HTN, severe MVR, grade 3 diastolic dysfunction  - Troponin negative  -per Pulmonary: Rocephin and Levaquin, Urine legionella , Urine strep , Keep I<O ; monitor strict Is and Os, Resume Home inhalers, Repeat CT as outpatient ,TSH level, Outpatient PFTs, Outpatient Pulmonary follow up    # Anemia, Normocytic  - No source identified  - Hemoglobin - 9.3(<10.3<11.2 admission)  - Monitor, Keep TnS active    # h/o COPD  - stable, Nebs PRN  - supplement oxygen PRN, monitor pulse Ox   - no steroids, pt not in exacerbation    # HTN - Continue with home medications   # h/o MR - monitor for volume overload  # Hypothyroid - Continue with Synthroid     Diet: DASH diet w/ fluid restriction  GI ppx: Protonix qD  DVT ppx: Lovenox 40 mg qD  Activity: Increase as tolerated  Code status: Full Code  Disposition: from home, monitor for improvement, pulmonary consult,

## 2020-01-20 NOTE — CONSULT NOTE ADULT - SUBJECTIVE AND OBJECTIVE BOX
Patient is a 79y old  Female who presents with a chief complaint of SOB (20 Jan 2020 10:39)      HPI:  80 yo F w/ PMH of GIB, PUD, diverticulosis, hemorrhoids, HTN, COPD not on home oxygen, severe MR, HFpEF, hypothyroidism presents to ED complaining of shortness of breath. As per pt, she has been having SOB for awhile and over these few weeks it been getting worse. Today, pt went outside and noticed tightness in chest associated with worsening SOB. Pt decided to present to ED for workup. Pt states she went to her PCP few weeks back because she was having fever and chills. She was prescribed abx but without improvement. Today, pt denies fever, chill, palpitation, diarrhea/constipation, dysuria, sick contacts or recent travel. Patient states she is compliant with her medications     CT performed Pretracheal lymph node measuring up to 1.5 cm in short axis. Follow-up pulmonary for possible further evaluation.Diffuse groundglass opacities, right worse on left.Stable, since 2015, 6 mm nodule in the right upper lobe   On my examination this morning - patient reports improvement in SOB and cough ;pulse ox 96 % RA on rest        Vital Signs Last 24 Hrs  T(C): 36.6 (17 Jan 2020 19:57), Max: 37.7 (17 Jan 2020 16:45)  T(F): 97.8 (17 Jan 2020 19:57), Max: 99.9 (17 Jan 2020 16:45)  HR: 60 (17 Jan 2020 19:57) (54 - 67)  BP: 142/69 (17 Jan 2020 19:57) (130/61 - 194/108)  BP(mean): --  RR: 18 (17 Jan 2020 19:57) (17 - 30)  SpO2: 96% RA          PAST MEDICAL & SURGICAL HISTORY:  Other specified hypothyroidism  Severe mitral regurgitation  COPD (chronic obstructive pulmonary disease)  CHF (congestive heart failure)  HTN (hypertension)  History of cholecystectomy      SOCIAL HX:   Smoking   former smoker                       ETOH  None                         FAMILY HISTORY:  .  No cardiovascular or pulmonary family history     Review of System:  See HPI    Allergies    No Known Allergies    Intolerances          PHYSICAL EXAM  Vital Signs Last 24 Hrs  T(C): 36.3 (20 Jan 2020 07:46), Max: 37.1 (20 Jan 2020 00:00)  T(F): 97.4 (20 Jan 2020 07:46), Max: 98.8 (20 Jan 2020 00:00)  HR: 53 (20 Jan 2020 07:46) (53 - 64)  BP: 126/80 (20 Jan 2020 07:46) (126/80 - 142/80)  BP(mean): --  RR: 20 (20 Jan 2020 07:46) (18 - 20)  SpO2: 96 % RA ,     General: In NAD  HEENT: TRACE , Goitre +            Lungs: Yasir BS  Cardiovascular: Regular  Gastrointestinal: Soft.  + BS   Musculoskeletal: No Clubbing.  Full range of motion.. Moves all extremities  Skin: Warm.    Neurological: No motor or sensory deficit       LABS:                          9.9    4.94  )-----------( 172      ( 20 Jan 2020 05:59 )             32.3                                               01-20    139  |  103  |  14  ----------------------------<  104<H>  4.0   |  27  |  0.9    Ca    8.7      20 Jan 2020 05:59  Mg     2.0     01-19    TPro  6.8  /  Alb  3.6  /  TBili  0.3  /  DBili  x   /  AST  19  /  ALT  16  /  AlkPhos  59  01-20                                                                                           LIVER FUNCTIONS - ( 20 Jan 2020 05:59 )  Alb: 3.6 g/dL / Pro: 6.8 g/dL / ALK PHOS: 59 U/L / ALT: 16 U/L / AST: 19 U/L / GGT: x                                                  Culture - Blood (collected 18 Jan 2020 05:58)  Source: .Blood None  Preliminary Report (19 Jan 2020 18:01):    No growth to date.                                                    MEDICATIONS  (STANDING):  albuterol/ipratropium for Nebulization 3 milliLiter(s) Nebulizer every 6 hours  amLODIPine   Tablet 5 milliGRAM(s) Oral daily  atorvastatin 20 milliGRAM(s) Oral at bedtime  azithromycin  IVPB      azithromycin  IVPB 500 milliGRAM(s) IV Intermittent every 24 hours  cefTRIAXone   IVPB 1000 milliGRAM(s) IV Intermittent every 24 hours  enalapril 10 milliGRAM(s) Oral daily  enoxaparin Injectable 40 milliGRAM(s) SubCutaneous at bedtime  levothyroxine 25 MICROGram(s) Oral daily  metoprolol tartrate 25 milliGRAM(s) Oral two times a day  multivitamin 1 Tablet(s) Oral daily  pantoprazole    Tablet 40 milliGRAM(s) Oral before breakfast  senna 2 Tablet(s) Oral at bedtime    MEDICATIONS  (PRN):  ALBUTerol    90 MICROgram(s) HFA Inhaler 1 Puff(s) Inhalation every 4 hours PRN Bronchospasm      < from: CT Chest w/ IV Cont (01.18.20 @ 16:34) >  Pretracheal lymph node measuring up to 1.5 cm in short axis. Follow-up pulmonary for possible further evaluation.    Diffuse groundglass opacities, right worse on left.    Stable, since 2015, 6 mm nodule in the right upper lobe      < end of copied text > Patient is a 79y old  Female who presents with a chief complaint of SOB (20 Jan 2020 10:39)      HPI:  80 yo F w/ PMH of GIB, PUD, diverticulosis, hemorrhoids, HTN, COPD not on home oxygen, severe MR, HFpEF, hypothyroidism presents to ED complaining of shortness of breath. As per pt, she has been having SOB for awhile and over these few weeks it been getting worse. Today, pt went outside and noticed tightness in chest associated with worsening SOB. Pt decided to present to ED for workup. Pt states she went to her PCP few weeks back because she was having fever and chills. She was prescribed abx but without improvement. Today, pt denies fever, chill, palpitation, diarrhea/constipation, dysuria, sick contacts or recent travel. Patient states she is compliant with her medications     CT performed Pretracheal lymph node measuring up to 1.5 cm in short axis. Follow-up pulmonary for possible further evaluation.Diffuse groundglass opacities, right worse on left.Stable, since 2015, 6 mm nodule in the right upper lobe   On my examination this morning - patient reports improvement in SOB and cough ;pulse ox 96 % RA on rest and want to go home        Vital Signs Last 24 Hrs  T(C): 36.6 (17 Jan 2020 19:57), Max: 37.7 (17 Jan 2020 16:45)  T(F): 97.8 (17 Jan 2020 19:57), Max: 99.9 (17 Jan 2020 16:45)  HR: 60 (17 Jan 2020 19:57) (54 - 67)  BP: 142/69 (17 Jan 2020 19:57) (130/61 - 194/108)  RR: 18 (17 Jan 2020 19:57) (17 - 30)  SpO2: 96% RA          PAST MEDICAL & SURGICAL HISTORY:  Other specified hypothyroidism  Severe mitral regurgitation  COPD (chronic obstructive pulmonary disease)  CHF (congestive heart failure)  HTN (hypertension)  History of cholecystectomy      SOCIAL HX:   Smoking   former smoker                       ETOH  None                         FAMILY HISTORY:  .  No cardiovascular or pulmonary family history     Review of System:  See HPI    Allergies    No Known Allergies    Intolerances          PHYSICAL EXAM  Vital Signs Last 24 Hrs  T(C): 36.3 (20 Jan 2020 07:46), Max: 37.1 (20 Jan 2020 00:00)  T(F): 97.4 (20 Jan 2020 07:46), Max: 98.8 (20 Jan 2020 00:00)  HR: 53 (20 Jan 2020 07:46) (53 - 64)  BP: 126/80 (20 Jan 2020 07:46) (126/80 - 142/80  RR: 20 (20 Jan 2020 07:46) (18 - 20)  SpO2: 96 % RA ,     General: In NAD  HEENT: TRACE , Goitre +            Lungs: Yasir BS, bibasialr crackles  Cardiovascular: Regular  Gastrointestinal: Soft.  + BS   Musculoskeletal: No Clubbing.  Full range of motion.. Moves all extremities  Skin: Warm.    Neurological: No motor or sensory deficit       LABS:                          9.9    4.94  )-----------( 172      ( 20 Jan 2020 05:59 )             32.3                                               01-20    139  |  103  |  14  ----------------------------<  104<H>  4.0   |  27  |  0.9    Ca    8.7      20 Jan 2020 05:59  Mg     2.0     01-19    TPro  6.8  /  Alb  3.6  /  TBili  0.3  /  DBili  x   /  AST  19  /  ALT  16  /  AlkPhos  59  01-20                                                                                           LIVER FUNCTIONS - ( 20 Jan 2020 05:59 )  Alb: 3.6 g/dL / Pro: 6.8 g/dL / ALK PHOS: 59 U/L / ALT: 16 U/L / AST: 19 U/L / GGT: x                                                  Culture - Blood (collected 18 Jan 2020 05:58)  Source: .Blood None  Preliminary Report (19 Jan 2020 18:01):    No growth to date.                                                    MEDICATIONS  (STANDING):  albuterol/ipratropium for Nebulization 3 milliLiter(s) Nebulizer every 6 hours  amLODIPine   Tablet 5 milliGRAM(s) Oral daily  atorvastatin 20 milliGRAM(s) Oral at bedtime  azithromycin  IVPB      azithromycin  IVPB 500 milliGRAM(s) IV Intermittent every 24 hours  cefTRIAXone   IVPB 1000 milliGRAM(s) IV Intermittent every 24 hours  enalapril 10 milliGRAM(s) Oral daily  enoxaparin Injectable 40 milliGRAM(s) SubCutaneous at bedtime  levothyroxine 25 MICROGram(s) Oral daily  metoprolol tartrate 25 milliGRAM(s) Oral two times a day  multivitamin 1 Tablet(s) Oral daily  pantoprazole    Tablet 40 milliGRAM(s) Oral before breakfast  senna 2 Tablet(s) Oral at bedtime    MEDICATIONS  (PRN):  ALBUTerol    90 MICROgram(s) HFA Inhaler 1 Puff(s) Inhalation every 4 hours PRN Bronchospasm      < from: CT Chest w/ IV Cont (01.18.20 @ 16:34) >  Pretracheal lymph node measuring up to 1.5 cm in short axis. Follow-up pulmonary for possible further evaluation.    Diffuse groundglass opacities, right worse on left.    Stable, since 2015, 6 mm nodule in the right upper lobe      < end of copied text >

## 2020-01-20 NOTE — PROGRESS NOTE ADULT - SUBJECTIVE AND OBJECTIVE BOX
[place], SCOTTY  79y Female    CHIEF COMPLAINT:    Patient is a 79y old  Female who presents with a chief complaint of SOB (20 Jan 2020 08:36)      INTERVAL HPI/OVERNIGHT EVENTS:    Patient seen and examined.    ROS: All other systems are negative.    Vital Signs:    T(F): 97.4 (01-20-20 @ 07:46), Max: 98.8 (01-20-20 @ 00:00)  HR: 53 (01-20-20 @ 07:46) (53 - 64)  BP: 126/80 (01-20-20 @ 07:46) (126/80 - 142/80)  RR: 20 (01-20-20 @ 07:46) (18 - 20)  SpO2: 97% (01-20-20 @ 05:13) (96% - 97%)  I&O's Summary    19 Jan 2020 07:01  -  20 Jan 2020 07:00  --------------------------------------------------------  IN: 630 mL / OUT: 0 mL / NET: 630 mL    20 Jan 2020 07:01  -  20 Jan 2020 10:40  --------------------------------------------------------  IN: 3600 mL / OUT: 0 mL / NET: 3600 mL      Daily     Daily   CAPILLARY BLOOD GLUCOSE          PHYSICAL EXAM:    GENERAL:  NAD  SKIN: No rashes or lesions  HENT: Atrumatic. Normocephalic. PERRL. Moist membranes.  NECK: Supple, No JVD. No lymphadenopathy.  PULMONARY: CTA B/L. No wheezing. No rales  CVS: Normal S1, S2. Rate and Rythm are regular. No murmurs.  ABDOMEN/GI: Soft, Nontender, Nondistended; BS present  EXTREMITIES: Peripheral pulses intact. No edema B/L LE.  NEUROLOGIC:  No motor or sensory deficit.  PSYCH: Alert & oriented x 3    Consultant(s) Notes Reviewed:  [x ] YES  [ ] NO  Care Discussed with Consultants/Other Providers [ x] YES  [ ] NO    EKG reviewed  Telemetry reviewed    LABS:                        9.9    4.94  )-----------( 172      ( 20 Jan 2020 05:59 )             32.3     01-20    139  |  103  |  14  ----------------------------<  104<H>  4.0   |  27  |  0.9    Ca    8.7      20 Jan 2020 05:59  Mg     2.0     01-19    TPro  6.8  /  Alb  3.6  /  TBili  0.3  /  DBili  x   /  AST  19  /  ALT  16  /  AlkPhos  59  01-20      Serum Pro-Brain Natriuretic Peptide: 881 pg/mL (01-17-20 @ 17:03)    Trop <0.01, CKMB --, CK --, 01-18-20 @ 05:58  Trop <0.01, CKMB --, CK --, 01-18-20 @ 01:30  Trop <0.01, CKMB --, CK --, 01-17-20 @ 17:03      Culture - Blood (collected 18 Jan 2020 05:58)  Source: .Blood None  Preliminary Report (19 Jan 2020 18:01):    No growth to date.        RADIOLOGY & ADDITIONAL TESTS:    < from: Transthoracic Echocardiogram (01.19.20 @ 11:54) >    Summary:   1. Normal global left ventricular systolic function.   2. LV Ejection Fraction by Burton's Method with a biplane EF of 67 %.   3. Mild left ventricular hypertrophy.   4. Mildly increased LV wall thickness.   5. Normal left ventricular internal cavity size.   6. Spectral Doppler shows restrictive pattern of left ventricular myocardial filling (Grade III diastolic dysfunction).   7. Bowing of atrial septum left to rightsuggestd elevated LA pressure.   8. Mild thickening of the anterior and posterior mitral valve leaflets.   9. Severe mitral valve regurgitation.  10. Sclerotic aortic valve with normal opening.  11. Estimated pulmonary artery systolic pressure is 65.7 mmHg assuming a right atrial pressure of 10 mmHg, which is consistent with severe pulmonary hypertension.  12. LA volume Index is 47.4 ml/m² ml/m2.      < end of copied text >    Imaging or report Personally Reviewed:  [ ] YES  [ ] NO    Medications:  Standing  albuterol/ipratropium for Nebulization 3 milliLiter(s) Nebulizer every 6 hours  amLODIPine   Tablet 5 milliGRAM(s) Oral daily  atorvastatin 20 milliGRAM(s) Oral at bedtime  azithromycin  IVPB      azithromycin  IVPB 500 milliGRAM(s) IV Intermittent every 24 hours  cefTRIAXone   IVPB 1000 milliGRAM(s) IV Intermittent every 24 hours  enalapril 10 milliGRAM(s) Oral daily  enoxaparin Injectable 40 milliGRAM(s) SubCutaneous at bedtime  levothyroxine 25 MICROGram(s) Oral daily  metoprolol tartrate 25 milliGRAM(s) Oral two times a day  multivitamin 1 Tablet(s) Oral daily  pantoprazole    Tablet 40 milliGRAM(s) Oral before breakfast  senna 2 Tablet(s) Oral at bedtime    PRN Meds  ALBUTerol    90 MICROgram(s) HFA Inhaler 1 Puff(s) Inhalation every 4 hours PRN      Case discussed with resident    Care discussed with pt/family SCOTTY HANNON  79y Female    CHIEF COMPLAINT:    Patient is a 79y old  Female who presents with a chief complaint of SOB (20 Jan 2020 08:36)      INTERVAL HPI/OVERNIGHT EVENTS:    Patient seen and examined. No sob. Cough is improving. No fever. Pulm and cardiology evaluations are pending.    ROS: All other systems are negative.    Vital Signs:    T(F): 97.4 (01-20-20 @ 07:46), Max: 98.8 (01-20-20 @ 00:00)  HR: 53 (01-20-20 @ 07:46) (53 - 64)  BP: 126/80 (01-20-20 @ 07:46) (126/80 - 142/80)  RR: 20 (01-20-20 @ 07:46) (18 - 20)  SpO2: 97% (01-20-20 @ 05:13) (96% - 97%)  I&O's Summary    19 Jan 2020 07:01  -  20 Jan 2020 07:00  --------------------------------------------------------  IN: 630 mL / OUT: 0 mL / NET: 630 mL    20 Jan 2020 07:01  -  20 Jan 2020 10:40  --------------------------------------------------------  IN: 3600 mL / OUT: 0 mL / NET: 3600 mL      Daily     Daily   CAPILLARY BLOOD GLUCOSE          PHYSICAL EXAM:    GENERAL:  NAD  SKIN: No rashes or lesions  HENT: Atraumatic Normocephalic. PERRL. Moist membranes.  NECK: Supple, No JVD. No lymphadenopathy.  PULMONARY: CTA B/L. No wheezing. No rales  CVS: Normal S1, S2. Rate and Rhythm are regular. AIV/VI systolic mm over mitral area radiating to axilla.   ABDOMEN/GI: Soft, Nontender, Nondistended; BS present  EXTREMITIES: Peripheral pulses intact. No edema B/L LE.  NEUROLOGIC:  No motor or sensory deficit.  PSYCH: Alert & oriented x 3    Consultant(s) Notes Reviewed:  [x ] YES  [ ] NO  Care Discussed with Consultants/Other Providers [ x] YES  [ ] NO    EKG reviewed  Telemetry reviewed    LABS:                        9.9    4.94  )-----------( 172      ( 20 Jan 2020 05:59 )             32.3     01-20    139  |  103  |  14  ----------------------------<  104<H>  4.0   |  27  |  0.9    Ca    8.7      20 Jan 2020 05:59  Mg     2.0     01-19    TPro  6.8  /  Alb  3.6  /  TBili  0.3  /  DBili  x   /  AST  19  /  ALT  16  /  AlkPhos  59  01-20      Serum Pro-Brain Natriuretic Peptide: 881 pg/mL (01-17-20 @ 17:03)    Trop <0.01, CKMB --, CK --, 01-18-20 @ 05:58  Trop <0.01, CKMB --, CK --, 01-18-20 @ 01:30  Trop <0.01, CKMB --, CK --, 01-17-20 @ 17:03      Culture - Blood (collected 18 Jan 2020 05:58)  Source: .Blood None  Preliminary Report (19 Jan 2020 18:01):    No growth to date.        RADIOLOGY & ADDITIONAL TESTS:    < from: Transthoracic Echocardiogram (01.19.20 @ 11:54) >    Summary:   1. Normal global left ventricular systolic function.   2. LV Ejection Fraction by Burton's Method with a biplane EF of 67 %.   3. Mild left ventricular hypertrophy.   4. Mildly increased LV wall thickness.   5. Normal left ventricular internal cavity size.   6. Spectral Doppler shows restrictive pattern of left ventricular myocardial filling (Grade III diastolic dysfunction).   7. Bowing of atrial septum left to rightsuggestd elevated LA pressure.   8. Mild thickening of the anterior and posterior mitral valve leaflets.   9. Severe mitral valve regurgitation.  10. Sclerotic aortic valve with normal opening.  11. Estimated pulmonary artery systolic pressure is 65.7 mmHg assuming a right atrial pressure of 10 mmHg, which is consistent with severe pulmonary hypertension.  12. LA volume Index is 47.4 ml/m² ml/m2.      < end of copied text >    Imaging or report Personally Reviewed:  [ ] YES  [ ] NO    Medications:  Standing  albuterol/ipratropium for Nebulization 3 milliLiter(s) Nebulizer every 6 hours  amLODIPine   Tablet 5 milliGRAM(s) Oral daily  atorvastatin 20 milliGRAM(s) Oral at bedtime  azithromycin  IVPB      azithromycin  IVPB 500 milliGRAM(s) IV Intermittent every 24 hours  cefTRIAXone   IVPB 1000 milliGRAM(s) IV Intermittent every 24 hours  enalapril 10 milliGRAM(s) Oral daily  enoxaparin Injectable 40 milliGRAM(s) SubCutaneous at bedtime  levothyroxine 25 MICROGram(s) Oral daily  metoprolol tartrate 25 milliGRAM(s) Oral two times a day  multivitamin 1 Tablet(s) Oral daily  pantoprazole    Tablet 40 milliGRAM(s) Oral before breakfast  senna 2 Tablet(s) Oral at bedtime    PRN Meds  ALBUTerol    90 MICROgram(s) HFA Inhaler 1 Puff(s) Inhalation every 4 hours PRN      Case discussed with resident    Care discussed with pt/family

## 2020-01-21 ENCOUNTER — TRANSCRIPTION ENCOUNTER (OUTPATIENT)
Age: 80
End: 2020-01-21

## 2020-01-21 VITALS
RESPIRATION RATE: 17 BRPM | HEART RATE: 52 BPM | TEMPERATURE: 97 F | SYSTOLIC BLOOD PRESSURE: 122 MMHG | DIASTOLIC BLOOD PRESSURE: 70 MMHG

## 2020-01-21 LAB
ANION GAP SERPL CALC-SCNC: 12 MMOL/L — SIGNIFICANT CHANGE UP (ref 7–14)
BASOPHILS # BLD AUTO: 0.07 K/UL — SIGNIFICANT CHANGE UP (ref 0–0.2)
BASOPHILS NFR BLD AUTO: 1.4 % — HIGH (ref 0–1)
BUN SERPL-MCNC: 12 MG/DL — SIGNIFICANT CHANGE UP (ref 10–20)
CALCIUM SERPL-MCNC: 9.1 MG/DL — SIGNIFICANT CHANGE UP (ref 8.5–10.1)
CHLORIDE SERPL-SCNC: 101 MMOL/L — SIGNIFICANT CHANGE UP (ref 98–110)
CO2 SERPL-SCNC: 26 MMOL/L — SIGNIFICANT CHANGE UP (ref 17–32)
CREAT SERPL-MCNC: 0.8 MG/DL — SIGNIFICANT CHANGE UP (ref 0.7–1.5)
EOSINOPHIL # BLD AUTO: 0.27 K/UL — SIGNIFICANT CHANGE UP (ref 0–0.7)
EOSINOPHIL NFR BLD AUTO: 5.4 % — SIGNIFICANT CHANGE UP (ref 0–8)
GLUCOSE SERPL-MCNC: 117 MG/DL — HIGH (ref 70–99)
HCT VFR BLD CALC: 33.6 % — LOW (ref 37–47)
HGB BLD-MCNC: 10.6 G/DL — LOW (ref 12–16)
IMM GRANULOCYTES NFR BLD AUTO: 0.2 % — SIGNIFICANT CHANGE UP (ref 0.1–0.3)
LEGIONELLA AG UR QL: NEGATIVE — SIGNIFICANT CHANGE UP
LYMPHOCYTES # BLD AUTO: 1.5 K/UL — SIGNIFICANT CHANGE UP (ref 1.2–3.4)
LYMPHOCYTES # BLD AUTO: 30 % — SIGNIFICANT CHANGE UP (ref 20.5–51.1)
MAGNESIUM SERPL-MCNC: 2.2 MG/DL — SIGNIFICANT CHANGE UP (ref 1.8–2.4)
MCHC RBC-ENTMCNC: 30.5 PG — SIGNIFICANT CHANGE UP (ref 27–31)
MCHC RBC-ENTMCNC: 31.5 G/DL — LOW (ref 32–37)
MCV RBC AUTO: 96.8 FL — SIGNIFICANT CHANGE UP (ref 81–99)
MONOCYTES # BLD AUTO: 0.54 K/UL — SIGNIFICANT CHANGE UP (ref 0.1–0.6)
MONOCYTES NFR BLD AUTO: 10.8 % — HIGH (ref 1.7–9.3)
NEUTROPHILS # BLD AUTO: 2.61 K/UL — SIGNIFICANT CHANGE UP (ref 1.4–6.5)
NEUTROPHILS NFR BLD AUTO: 52.2 % — SIGNIFICANT CHANGE UP (ref 42.2–75.2)
NRBC # BLD: 0 /100 WBCS — SIGNIFICANT CHANGE UP (ref 0–0)
PHOSPHATE SERPL-MCNC: 4.3 MG/DL — SIGNIFICANT CHANGE UP (ref 2.1–4.9)
PLATELET # BLD AUTO: 198 K/UL — SIGNIFICANT CHANGE UP (ref 130–400)
POTASSIUM SERPL-MCNC: 4.1 MMOL/L — SIGNIFICANT CHANGE UP (ref 3.5–5)
POTASSIUM SERPL-SCNC: 4.1 MMOL/L — SIGNIFICANT CHANGE UP (ref 3.5–5)
RBC # BLD: 3.47 M/UL — LOW (ref 4.2–5.4)
RBC # FLD: 14.4 % — SIGNIFICANT CHANGE UP (ref 11.5–14.5)
SODIUM SERPL-SCNC: 139 MMOL/L — SIGNIFICANT CHANGE UP (ref 135–146)
WBC # BLD: 5 K/UL — SIGNIFICANT CHANGE UP (ref 4.8–10.8)
WBC # FLD AUTO: 5 K/UL — SIGNIFICANT CHANGE UP (ref 4.8–10.8)

## 2020-01-21 PROCEDURE — 99239 HOSP IP/OBS DSCHRG MGMT >30: CPT

## 2020-01-21 RX ORDER — ALBUTEROL 90 UG/1
1 AEROSOL, METERED ORAL
Qty: 0 | Refills: 0 | DISCHARGE
Start: 2020-01-21

## 2020-01-21 RX ORDER — AZITHROMYCIN 500 MG/1
1 TABLET, FILM COATED ORAL
Qty: 2 | Refills: 0
Start: 2020-01-21 | End: 2020-01-22

## 2020-01-21 RX ORDER — ALBUTEROL 90 UG/1
1 AEROSOL, METERED ORAL
Qty: 4 | Refills: 0
Start: 2020-01-21

## 2020-01-21 RX ADMIN — Medication 25 MICROGRAM(S): at 05:02

## 2020-01-21 RX ADMIN — Medication 25 MILLIGRAM(S): at 05:02

## 2020-01-21 RX ADMIN — Medication 40 MILLIGRAM(S): at 05:02

## 2020-01-21 RX ADMIN — Medication 10 MILLIGRAM(S): at 05:02

## 2020-01-21 RX ADMIN — Medication 40 MILLIGRAM(S): at 11:52

## 2020-01-21 RX ADMIN — PANTOPRAZOLE SODIUM 40 MILLIGRAM(S): 20 TABLET, DELAYED RELEASE ORAL at 05:02

## 2020-01-21 RX ADMIN — AMLODIPINE BESYLATE 5 MILLIGRAM(S): 2.5 TABLET ORAL at 05:02

## 2020-01-21 RX ADMIN — Medication 3 MILLILITER(S): at 12:26

## 2020-01-21 RX ADMIN — Medication 1 TABLET(S): at 11:33

## 2020-01-21 NOTE — DISCHARGE NOTE PROVIDER - NSDCMRMEDTOKEN_GEN_ALL_CORE_FT
albuterol 2.5 mg/3 mL (0.083%) inhalation solution: 3 milliliter(s) inhaled every 2 hours, As Needed  amLODIPine 5 mg oral tablet: 1 tab(s) orally once a day  atorvastatin 20 mg oral tablet: 1 tab(s) orally once a day  azithromycin 500 mg oral tablet: 1 tab(s) orally once a day x 2 days   Breo Ellipta 100 mcg-25 mcg/inh inhalation powder: 1 puff(s) inhaled once a day  Colace 100 mg oral capsule: 1 cap(s) orally 2 times a day   enalapril 10 mg oral tablet: 1 tab(s) orally once a day  furosemide 40 mg oral tablet: 1 tab(s) orally once a day  levothyroxine 25 mcg (0.025 mg) oral tablet: 1 tab(s) orally once a day  metoprolol tartrate 25 mg oral tablet: 1 tab(s) orally 2 times a day  Multiple Vitamins oral tablet: 1 tab(s) orally once a day  predniSONE 20 mg oral tablet: 2 tab(s) orally once a day x 4 days, then 1 tablet orally once a day for 5 days, then stop   ProAir HFA 90 mcg/inh inhalation aerosol: 1 puff(s) inhaled every 4 hours, As needed, Bronchospasm  Protonix 40 mg oral delayed release tablet: 2 tab(s) orally 2 times a day  Before breakfast and at bedtime

## 2020-01-21 NOTE — CONSULT NOTE ADULT - SUBJECTIVE AND OBJECTIVE BOX
Patient is a 79y old  Female who presents with a chief complaint of SOB (20 Jan 2020 10:46)    HPI:  78 yo F w/ PMH of GIB, PUD, diverticulosis, hemorrhoids, HTN, COPD, severe MR, HFpEF, hypothyroidism presents to ED complaining of shortness of breath. As per pt, she has been having SOB for awhile and over these few weeks it been getting worse. Today, pt went outside and noticed tightness in chest associated with worsening SOB. Pt decided to present to ED for workup. Pt states she went to her PCP few weeks back because she was having fever and chills. She was prescribed abx but without improvement. Today, pt denies fever, chill, palpitation, diarrhea/constipation, dysuria, sick contacts or recent travel. Pt states she is compliant with her medications.    Vital Signs Last 24 Hrs  T(C): 36.6 (17 Jan 2020 19:57), Max: 37.7 (17 Jan 2020 16:45)  T(F): 97.8 (17 Jan 2020 19:57), Max: 99.9 (17 Jan 2020 16:45)  HR: 60 (17 Jan 2020 19:57) (54 - 67)  BP: 142/69 (17 Jan 2020 19:57) (130/61 - 194/108)  BP(mean): --  RR: 18 (17 Jan 2020 19:57) (17 - 30)  SpO2: 93% (17 Jan 2020 19:57) (93% - 100%)    In ED, pt has CXR showing right lung field congestion (pending official read). Pt is admitted to medicine for suspected CHF vs COPD exacerbation. (17 Jan 2020 20:38)      ROS:  All other systems reviewed and are negative    PAST MEDICAL & SURGICAL HISTORY  Other specified hypothyroidism  Severe mitral regurgitation  COPD (chronic obstructive pulmonary disease)  CHF (congestive heart failure)  HTN (hypertension)  History of cholecystectomy      FAMILY HISTORY:  FAMILY HISTORY:      SOCIAL HISTORY:  []smoker  []Alcohol  []Drug    ALLERGIES:  No Known Allergies      MEDICATIONS:  MEDICATIONS  (STANDING):  albuterol/ipratropium for Nebulization 3 milliLiter(s) Nebulizer every 6 hours  amLODIPine   Tablet 5 milliGRAM(s) Oral daily  atorvastatin 20 milliGRAM(s) Oral at bedtime  azithromycin  IVPB      azithromycin  IVPB 500 milliGRAM(s) IV Intermittent every 24 hours  cefTRIAXone   IVPB 1000 milliGRAM(s) IV Intermittent every 24 hours  enalapril 10 milliGRAM(s) Oral daily  enoxaparin Injectable 40 milliGRAM(s) SubCutaneous at bedtime  furosemide    Tablet 40 milliGRAM(s) Oral daily  levothyroxine 25 MICROGram(s) Oral daily  metoprolol tartrate 25 milliGRAM(s) Oral two times a day  multivitamin 1 Tablet(s) Oral daily  pantoprazole    Tablet 40 milliGRAM(s) Oral before breakfast  predniSONE   Tablet 40 milliGRAM(s) Oral daily  senna 2 Tablet(s) Oral at bedtime    MEDICATIONS  (PRN):  ALBUTerol    90 MICROgram(s) HFA Inhaler 1 Puff(s) Inhalation every 4 hours PRN Bronchospasm      HOME MEDICATIONS:  Home Medications:  albuterol 2.5 mg/3 mL (0.083%) inhalation solution: 3 milliliter(s) inhaled every 2 hours, As Needed (17 Jan 2020 20:42)  amLODIPine 5 mg oral tablet: 1 tab(s) orally once a day (17 Jan 2020 20:42)  atorvastatin 20 mg oral tablet: 1 tab(s) orally once a day (17 Jan 2020 20:42)  Breo Ellipta 100 mcg-25 mcg/inh inhalation powder: 1 puff(s) inhaled once a day (17 Jan 2020 20:42)  enalapril 10 mg oral tablet: 1 tab(s) orally once a day (17 Jan 2020 20:42)  furosemide 40 mg oral tablet: 1 tab(s) orally once a day (17 Jan 2020 20:42)  levothyroxine 25 mcg (0.025 mg) oral tablet: 1 tab(s) orally once a day (17 Jan 2020 20:42)  metoprolol tartrate 25 mg oral tablet: 1 tab(s) orally 2 times a day (17 Jan 2020 20:42)      VITALS:   T(F): 96.6 (01-21 @ 07:45), Max: 98.8 (01-20 @ 00:00)  HR: 52 (01-21 @ 07:45) (52 - 73)  BP: 122/70 (01-21 @ 07:45) (122/70 - 176/80)  BP(mean): --  RR: 17 (01-21 @ 07:45) (17 - 20)  SpO2: 97% (01-20 @ 20:09) (96% - 100%)    I&O's Summary    20 Jan 2020 07:01  -  21 Jan 2020 07:00  --------------------------------------------------------  IN: 4170 mL / OUT: 0 mL / NET: 4170 mL        PHYSICAL EXAM:  GEN: Alert and oriented X 3, No acute distress  HEENT: no pallor  NECK: Supple, no JVD  LUNGS: trace bibasilar crackles  CARDIOVASCULAR: S1/S2 regular, no murmurs or rubs  ABD: Soft, BS+  EXT: No Lower extremity edema/cyanosis  NEURO: Non focal  SKIN: Intact    LABS:                        10.6   5.00  )-----------( 198      ( 21 Jan 2020 06:13 )             33.6     01-21    139  |  101  |  12  ----------------------------<  117<H>  4.1   |  26  |  0.8    Ca    9.1      21 Jan 2020 06:13  Phos  4.3     01-21  Mg     2.2     01-21    TPro  6.8  /  Alb  3.6  /  TBili  0.3  /  DBili  x   /  AST  19  /  ALT  16  /  AlkPhos  59  01-20              Troponin trend:      01-18 Chol 177 LDL 96 HDL 79 Trig 37  Hemoglobin A1C   Thyroid      RADIOLOGY:  -CXR:   -CT: < from: CT Chest w/ IV Cont (01.18.20 @ 16:34) >  Impression:    Pretracheal lymph node measuring up to 1.5 cm in short axis. Follow-up pulmonary for possible further evaluation.    Diffuse groundglass opacities, right worse on left.    Stable, since 2015, 6 mm nodule in the right upper lobe    < end of copied text >    -TTE: < from: Transthoracic Echocardiogram (01.19.20 @ 11:54) >  Summary:   1. Normal global left ventricular systolic function.   2. LV Ejection Fraction by Burton's Method with a biplane EF of 67 %.   3. Mild left ventricular hypertrophy.   4. Mildly increased LV wall thickness.   5. Normal left ventricular internal cavity size.   6. Spectral Doppler shows restrictive pattern of left ventricular myocardial filling (Grade III diastolic dysfunction).   7. Bowing of atrial septum left to rightsuggestd elevated LA pressure.   8. Mild thickening of the anterior and posterior mitral valve leaflets.   9. Severe mitral valve regurgitation.  10. Sclerotic aortic valve with normal opening.  11. Estimated pulmonary artery systolic pressure is 65.7 mmHg assuming a right atrial pressure of 10 mmHg, which is consistent with severe pulmonary hypertension.  12. LA volume Index is 47.4 ml/m² ml/m2.      < end of copied text >    -CCTA:  -STRESS TEST:  -CATHETERIZATION: 4/17 : nonobstructive CAD, severe MR    ECG:    TELEMETRY EVENTS:

## 2020-01-21 NOTE — DISCHARGE NOTE PROVIDER - PROVIDER TOKENS
PROVIDER:[TOKEN:[[id number]],FOLLOWUP:[2 weeks]],PROVIDER:[TOKEN:[[id number]],FOLLOWUP:[2 weeks]],PROVIDER:[TOKEN:[[id number]],FOLLOWUP:[1 week]]

## 2020-01-21 NOTE — DISCHARGE NOTE PROVIDER - CARE PROVIDERS DIRECT ADDRESSES
,DirectAddress_Unknown,DirectAddress_Unknown,[email or web address]

## 2020-01-21 NOTE — CHART NOTE - NSCHARTNOTEFT_GEN_A_CORE
<<<RESIDENT DISCHARGE NOTE>>>     SCOTTY HANNON  MRN-[id number]    VITAL SIGNS:  T(F): 96.6 (01-21-20 @ 07:45), Max: 97.8 (01-20-20 @ 20:09)  HR: 52 (01-21-20 @ 07:45)  BP: 122/70 (01-21-20 @ 07:45)  SpO2: 97% (01-20-20 @ 20:09)      PHYSICAL EXAMINATION:  GEN: NAD, Resting comfortably in bed  PULM: Clear to auscultation bilaterally, No wheezes  CVS: Regular rate and rhythm, S1-S2, no murmurs  ABD: Soft, non-tender, non-distended, no guarding  EXT: No edema  NEURO: AAOx3, no focal deficits    TEST RESULTS:                        10.6   5.00  )-----------( 198      ( 21 Jan 2020 06:13 )             33.6       01-21    139  |  101  |  12  ----------------------------<  117<H>  4.1   |  26  |  0.8    Ca    9.1      21 Jan 2020 06:13  Phos  4.3     01-21  Mg     2.2     01-21    TPro  6.8  /  Alb  3.6  /  TBili  0.3  /  DBili  x   /  AST  19  /  ALT  16  /  AlkPhos  59  01-20      FINAL DISCHARGE INTERVIEW:  Resident(s) Present: (Name: )    DISCHARGE MEDICATION RECONCILIATION  reviewed with Attending (Name: Dr. Naik)    DISPOSITION:   [  ] Home,    [ x ] Home with Visiting Nursing Services,   [  ]  SNF/ NH,    [  ] Acute Rehab (4A),   [  ] Other (Specify:_________)

## 2020-01-21 NOTE — DISCHARGE NOTE NURSING/CASE MANAGEMENT/SOCIAL WORK - PATIENT PORTAL LINK FT
You can access the FollowMyHealth Patient Portal offered by [facility] by registering at the following website: [email or web address]. By joining [facility]’s FollowMyHealth portal, you will also be able to view your health information using other applications (apps) compatible with our system.

## 2020-01-21 NOTE — DISCHARGE NOTE PROVIDER - HOSPITAL COURSE
78 yo F w/ PMH of GIB, PUD, diverticulosis, hemorrhoids, HTN, COPD, severe MR, HFpEF, hypothyroidism presents to ED complaining of shortness of breath. As per pt, she has been having SOB for awhile and over these few weeks it been getting worse. Today, pt went outside and noticed tightness in chest associated with worsening SOB. Pt decided to present to ED for workup. Pt states she went to her PCP few weeks back because she was having fever and chills. She was prescribed abx but without improvement. Today, pt denies fever, chill, palpitation, diarrhea/constipation, dysuria, sick contacts or recent travel. Pt states she is compliant with her medications.            During hospital course patient was started on IV Rocephin and azithromycin. On imaging, she was found to have ground glass opacities on CT chest with a stable 6 mm nodule (stable since 2015). She was seen by pulmonary who recommended to finish course of azithromycin, oral prednisone, repeat chest CT in 6 weeks, and outpatient PFTS.  started on IV rocephin, azithromycin. MRSA was negative. Echo showed EF 67 %, severe mitral valve regurgitation, and severe pulmonary hypertension. Patient was seen by cardiology who recommended outpatient follow. Patient saturating well on room air. She reported marked improvement in symptoms. She is stable for discharge.

## 2020-01-21 NOTE — CONSULT NOTE ADULT - ASSESSMENT
Severe mitral regurgitation with preserved EF  HFpEF - Euvolemic, No CHF, no angina  Nonobstructive CAD  COPD (chronic obstructive pulmonary disease) not O2 dependent  HTN (hypertension)  Hypothyroidism    Recommendations:  Evaluated outpatient by Dr Godoy for Mitraclip because she chose it over open mitral valve repair in 2018, but lost to follow up and did not consult with structural heart specialist.   follow up with Dr Godoy outpatient  Currently euvolemic, likely had viral URTI/PNA leading to SOB, now resolved  On lasix 40 at home, c/w same dose  c/w ACEI and CCB  Keep I=O

## 2020-01-21 NOTE — DISCHARGE NOTE PROVIDER - NSDCCPCAREPLAN_GEN_ALL_CORE_FT
PRINCIPAL DISCHARGE DIAGNOSIS  Diagnosis: CHF (congestive heart failure)  Assessment and Plan of Treatment: Please follow up with your outpatient cardiologist Dr. Godoy for further workup and  intervention of your heart valve (mitral valve).      SECONDARY DISCHARGE DIAGNOSES  Diagnosis: Lung nodule  Assessment and Plan of Treatment: During hospital course, you were noted to have a lung nodule. Please have a repeat chest CT in 6 weeks. Please also follow up with your pulmonary doctor in 2-4 weeks. We additionally recommend that you have outpatient pulmonary function tests during you outpatient follow up.    Diagnosis: PNA (pneumonia)  Assessment and Plan of Treatment: Please continue you oral antibiotics as prescribed. Please follow up with your primary care physician in 1-2 weeks.    Diagnosis: COPD (chronic obstructive pulmonary disease)  Assessment and Plan of Treatment: Please f/u outpatient with your pulmonary doctor for continued monitoring and treatement. Please have an outpatient PFT done during your next visit. Additionally, please continue all inhalaers and medications as prescribed.

## 2020-01-21 NOTE — DISCHARGE NOTE PROVIDER - CARE PROVIDER_API CALL
Michael Godoy)  Cardiology; Internal Medicine; Nuclear Cardiology  [address], [facility] [address]  [place] [address]  Phone: [phone]  Fax: [phone]  Follow Up Time: 2 weeks    Jl Valencia)  Critical Care Medicine; Internal Medicine; Pulmonary Disease; Sleep Medicine  [address], [place] [address]  Phone: [phone]  Fax: [phone]  Follow Up Time: 2 weeks    Jas Renner)  Internal Medicine  [address], [facility] [address]  [place] [address]  Phone: [phone]  Fax: [phone]  Follow Up Time: 1 week

## 2020-01-21 NOTE — PROGRESS NOTE ADULT - SUBJECTIVE AND OBJECTIVE BOX
WASHINGTONSCOTTY MRN-[id number]    Hospitalist Note  78yo F with Past Medical History UGIB secondary to Peptic Ulcer Disease, Diverticulosis, Hemorrhoids, HTN, COPD, severe MR status post clipping, Chronic Congestive Heart Failure, and Hypothyroidism admitted with dyspnea secondary to acute on chronic valvular congestive heart failure with possible gram negative pneumonia.    Overnight events/Updates: Her dyspnea has resolved from admission.  The patient was breathing comfortably on room air.    Vital Signs Last 24 Hrs  T(C): 35.9 (21 Jan 2020 07:45), Max: 36.6 (20 Jan 2020 20:09)  T(F): 96.6 (21 Jan 2020 07:45), Max: 97.8 (20 Jan 2020 20:09)  HR: 52 (21 Jan 2020 07:45) (52 - 68)  BP: 122/70 (21 Jan 2020 07:45) (122/70 - 136/68)  BP(mean): --  RR: 17 (21 Jan 2020 07:45) (17 - 18)  SpO2: 97% (20 Jan 2020 20:09) (97% - 97%)    Physical Examination:  General: AAO x 3  HEENT: PERRLA, EOMI  CV= S1 & S2 appreciated  Lungs= CTA BL  Abdominal Examination= + BS, Soft, NT/ND  Extremity Examination= No C/C/E    ROS: No chest pain, no shortness of breath.  All other systems reviewed and are within normal limits except for the complaints in the HPI.    MEDICATIONS  (STANDING):  albuterol/ipratropium for Nebulization 3 milliLiter(s) Nebulizer every 6 hours  amLODIPine   Tablet 5 milliGRAM(s) Oral daily  atorvastatin 20 milliGRAM(s) Oral at bedtime  azithromycin  IVPB      azithromycin  IVPB 500 milliGRAM(s) IV Intermittent every 24 hours  cefTRIAXone   IVPB 1000 milliGRAM(s) IV Intermittent every 24 hours  enalapril 10 milliGRAM(s) Oral daily  enoxaparin Injectable 40 milliGRAM(s) SubCutaneous at bedtime  furosemide    Tablet 40 milliGRAM(s) Oral daily  levothyroxine 25 MICROGram(s) Oral daily  metoprolol tartrate 25 milliGRAM(s) Oral two times a day  multivitamin 1 Tablet(s) Oral daily  pantoprazole    Tablet 40 milliGRAM(s) Oral before breakfast  predniSONE   Tablet 40 milliGRAM(s) Oral daily  senna 2 Tablet(s) Oral at bedtime    MEDICATIONS  (PRN):  ALBUTerol    90 MICROgram(s) HFA Inhaler 1 Puff(s) Inhalation every 4 hours PRN Bronchospasm                            10.6   5.00  )-----------( 198      ( 21 Jan 2020 06:13 )             33.6     01-21    139  |  101  |  12  ----------------------------<  117<H>  4.1   |  26  |  0.8    Ca    9.1      21 Jan 2020 06:13  Phos  4.3     01-21  Mg     2.2     01-21    TPro  6.8  /  Alb  3.6  /  TBili  0.3  /  DBili  x   /  AST  19  /  ALT  16  /  AlkPhos  59  01-20      Case discussed with housestaff & family  QUINTEN Naik [phone]

## 2020-01-22 LAB — TSH SERPL-MCNC: 8.67 UIU/ML — HIGH (ref 0.27–4.2)

## 2020-01-23 LAB
CULTURE RESULTS: SIGNIFICANT CHANGE UP
SPECIMEN SOURCE: SIGNIFICANT CHANGE UP

## 2020-01-24 DIAGNOSIS — R91.1 SOLITARY PULMONARY NODULE: ICD-10-CM

## 2020-01-24 DIAGNOSIS — I27.20 PULMONARY HYPERTENSION, UNSPECIFIED: ICD-10-CM

## 2020-01-24 DIAGNOSIS — Z87.891 PERSONAL HISTORY OF NICOTINE DEPENDENCE: ICD-10-CM

## 2020-01-24 DIAGNOSIS — D64.9 ANEMIA, UNSPECIFIED: ICD-10-CM

## 2020-01-24 DIAGNOSIS — I50.32 CHRONIC DIASTOLIC (CONGESTIVE) HEART FAILURE: ICD-10-CM

## 2020-01-24 DIAGNOSIS — J96.02 ACUTE RESPIRATORY FAILURE WITH HYPERCAPNIA: ICD-10-CM

## 2020-01-24 DIAGNOSIS — I11.0 HYPERTENSIVE HEART DISEASE WITH HEART FAILURE: ICD-10-CM

## 2020-01-24 DIAGNOSIS — J44.9 CHRONIC OBSTRUCTIVE PULMONARY DISEASE, UNSPECIFIED: ICD-10-CM

## 2020-01-24 DIAGNOSIS — J96.01 ACUTE RESPIRATORY FAILURE WITH HYPOXIA: ICD-10-CM

## 2020-01-24 DIAGNOSIS — K57.30 DIVERTICULOSIS OF LARGE INTESTINE WITHOUT PERFORATION OR ABSCESS WITHOUT BLEEDING: ICD-10-CM

## 2020-01-24 DIAGNOSIS — I25.10 ATHEROSCLEROTIC HEART DISEASE OF NATIVE CORONARY ARTERY WITHOUT ANGINA PECTORIS: ICD-10-CM

## 2020-01-24 DIAGNOSIS — I34.0 NONRHEUMATIC MITRAL (VALVE) INSUFFICIENCY: ICD-10-CM

## 2020-01-24 DIAGNOSIS — R06.02 SHORTNESS OF BREATH: ICD-10-CM

## 2020-01-24 DIAGNOSIS — J15.6 PNEUMONIA DUE TO OTHER GRAM-NEGATIVE BACTERIA: ICD-10-CM

## 2020-01-24 DIAGNOSIS — J44.0 CHRONIC OBSTRUCTIVE PULMONARY DISEASE WITH (ACUTE) LOWER RESPIRATORY INFECTION: ICD-10-CM

## 2020-01-24 DIAGNOSIS — E03.9 HYPOTHYROIDISM, UNSPECIFIED: ICD-10-CM

## 2020-01-24 DIAGNOSIS — R00.0 TACHYCARDIA, UNSPECIFIED: ICD-10-CM

## 2020-01-24 LAB — S PNEUM AG UR QL: NEGATIVE — SIGNIFICANT CHANGE UP

## 2020-03-11 NOTE — DISCHARGE NOTE ADULT - PRINCIPAL DIAGNOSIS
Per Larry at [facility], DME referral accepted.   Pneumonia of right lower lobe due to infectious organism

## 2020-04-10 ENCOUNTER — INPATIENT (INPATIENT)
Facility: HOSPITAL | Age: 80
LOS: 0 days | Discharge: HOME HEALTH | End: 2020-04-11
Attending: INTERNAL MEDICINE | Admitting: INTERNAL MEDICINE
Payer: MEDICARE

## 2020-04-10 VITALS
RESPIRATION RATE: 28 BRPM | TEMPERATURE: 98 F | DIASTOLIC BLOOD PRESSURE: 83 MMHG | HEART RATE: 73 BPM | SYSTOLIC BLOOD PRESSURE: 152 MMHG | OXYGEN SATURATION: 99 %

## 2020-04-10 DIAGNOSIS — Z98.890 OTHER SPECIFIED POSTPROCEDURAL STATES: Chronic | ICD-10-CM

## 2020-04-10 LAB
ALBUMIN SERPL ELPH-MCNC: 4.1 G/DL — SIGNIFICANT CHANGE UP (ref 3.5–5.2)
ALP SERPL-CCNC: 60 U/L — SIGNIFICANT CHANGE UP (ref 30–115)
ALT FLD-CCNC: 15 U/L — SIGNIFICANT CHANGE UP (ref 0–41)
ANION GAP SERPL CALC-SCNC: 10 MMOL/L — SIGNIFICANT CHANGE UP (ref 7–14)
AST SERPL-CCNC: 21 U/L — SIGNIFICANT CHANGE UP (ref 0–41)
BASOPHILS # BLD AUTO: 0.04 K/UL — SIGNIFICANT CHANGE UP (ref 0–0.2)
BASOPHILS NFR BLD AUTO: 0.8 % — SIGNIFICANT CHANGE UP (ref 0–1)
BILIRUB SERPL-MCNC: 0.3 MG/DL — SIGNIFICANT CHANGE UP (ref 0.2–1.2)
BUN SERPL-MCNC: 12 MG/DL — SIGNIFICANT CHANGE UP (ref 10–20)
CALCIUM SERPL-MCNC: 9.1 MG/DL — SIGNIFICANT CHANGE UP (ref 8.5–10.1)
CHLORIDE SERPL-SCNC: 100 MMOL/L — SIGNIFICANT CHANGE UP (ref 98–110)
CO2 SERPL-SCNC: 28 MMOL/L — SIGNIFICANT CHANGE UP (ref 17–32)
CREAT SERPL-MCNC: 0.8 MG/DL — SIGNIFICANT CHANGE UP (ref 0.7–1.5)
CRP SERPL-MCNC: 0.34 MG/DL — SIGNIFICANT CHANGE UP (ref 0–0.4)
D DIMER BLD IA.RAPID-MCNC: 444 NG/ML DDU — HIGH (ref 0–230)
EOSINOPHIL # BLD AUTO: 0.14 K/UL — SIGNIFICANT CHANGE UP (ref 0–0.7)
EOSINOPHIL NFR BLD AUTO: 2.7 % — SIGNIFICANT CHANGE UP (ref 0–8)
FERRITIN SERPL-MCNC: 124 NG/ML — SIGNIFICANT CHANGE UP (ref 15–150)
FIBRINOGEN PPP-MCNC: 527 MG/DL — SIGNIFICANT CHANGE UP (ref 204.4–570.6)
GLUCOSE SERPL-MCNC: 150 MG/DL — HIGH (ref 70–99)
HCT VFR BLD CALC: 33.7 % — LOW (ref 37–47)
HGB BLD-MCNC: 10.7 G/DL — LOW (ref 12–16)
IMM GRANULOCYTES NFR BLD AUTO: 0.2 % — SIGNIFICANT CHANGE UP (ref 0.1–0.3)
LACTATE SERPL-SCNC: 1.7 MMOL/L — SIGNIFICANT CHANGE UP (ref 0.7–2)
LDH SERPL L TO P-CCNC: 195 — SIGNIFICANT CHANGE UP (ref 50–242)
LYMPHOCYTES # BLD AUTO: 1.08 K/UL — LOW (ref 1.2–3.4)
LYMPHOCYTES # BLD AUTO: 21 % — SIGNIFICANT CHANGE UP (ref 20.5–51.1)
MCHC RBC-ENTMCNC: 31 PG — SIGNIFICANT CHANGE UP (ref 27–31)
MCHC RBC-ENTMCNC: 31.8 G/DL — LOW (ref 32–37)
MCV RBC AUTO: 97.7 FL — SIGNIFICANT CHANGE UP (ref 81–99)
MONOCYTES # BLD AUTO: 0.44 K/UL — SIGNIFICANT CHANGE UP (ref 0.1–0.6)
MONOCYTES NFR BLD AUTO: 8.6 % — SIGNIFICANT CHANGE UP (ref 1.7–9.3)
NEUTROPHILS # BLD AUTO: 3.43 K/UL — SIGNIFICANT CHANGE UP (ref 1.4–6.5)
NEUTROPHILS NFR BLD AUTO: 66.7 % — SIGNIFICANT CHANGE UP (ref 42.2–75.2)
NRBC # BLD: 0 /100 WBCS — SIGNIFICANT CHANGE UP (ref 0–0)
NT-PROBNP SERPL-SCNC: 836 PG/ML — HIGH (ref 0–300)
PLATELET # BLD AUTO: 197 K/UL — SIGNIFICANT CHANGE UP (ref 130–400)
POTASSIUM SERPL-MCNC: 3.9 MMOL/L — SIGNIFICANT CHANGE UP (ref 3.5–5)
POTASSIUM SERPL-SCNC: 3.9 MMOL/L — SIGNIFICANT CHANGE UP (ref 3.5–5)
PROCALCITONIN SERPL-MCNC: 0.03 NG/ML — SIGNIFICANT CHANGE UP (ref 0.02–0.1)
PROT SERPL-MCNC: 7.4 G/DL — SIGNIFICANT CHANGE UP (ref 6–8)
RBC # BLD: 3.45 M/UL — LOW (ref 4.2–5.4)
RBC # FLD: 13.6 % — SIGNIFICANT CHANGE UP (ref 11.5–14.5)
SARS-COV-2 RNA SPEC QL NAA+PROBE: SIGNIFICANT CHANGE UP
SODIUM SERPL-SCNC: 138 MMOL/L — SIGNIFICANT CHANGE UP (ref 135–146)
WBC # BLD: 5.14 K/UL — SIGNIFICANT CHANGE UP (ref 4.8–10.8)
WBC # FLD AUTO: 5.14 K/UL — SIGNIFICANT CHANGE UP (ref 4.8–10.8)

## 2020-04-10 PROCEDURE — 93010 ELECTROCARDIOGRAM REPORT: CPT

## 2020-04-10 PROCEDURE — 99285 EMERGENCY DEPT VISIT HI MDM: CPT

## 2020-04-10 PROCEDURE — 71045 X-RAY EXAM CHEST 1 VIEW: CPT | Mod: 26

## 2020-04-10 PROCEDURE — 99223 1ST HOSP IP/OBS HIGH 75: CPT

## 2020-04-10 RX ORDER — METOPROLOL TARTRATE 50 MG
1 TABLET ORAL
Qty: 0 | Refills: 0 | DISCHARGE

## 2020-04-10 RX ORDER — MONTELUKAST 4 MG/1
0 TABLET, CHEWABLE ORAL
Qty: 90 | Refills: 0 | DISCHARGE

## 2020-04-10 RX ORDER — FLUTICASONE FUROATE AND VILANTEROL TRIFENATATE 100; 25 UG/1; UG/1
1 POWDER RESPIRATORY (INHALATION)
Qty: 0 | Refills: 0 | DISCHARGE

## 2020-04-10 RX ORDER — FUROSEMIDE 40 MG
40 TABLET ORAL DAILY
Refills: 0 | Status: DISCONTINUED | OUTPATIENT
Start: 2020-04-10 | End: 2020-04-11

## 2020-04-10 RX ORDER — HYDROXYCHLOROQUINE SULFATE 200 MG
TABLET ORAL
Refills: 0 | Status: DISCONTINUED | OUTPATIENT
Start: 2020-04-10 | End: 2020-04-11

## 2020-04-10 RX ORDER — FLUTICASONE FUROATE AND VILANTEROL TRIFENATATE 100; 25 UG/1; UG/1
0 POWDER RESPIRATORY (INHALATION)
Qty: 60 | Refills: 0 | DISCHARGE

## 2020-04-10 RX ORDER — LEVOTHYROXINE SODIUM 125 MCG
0 TABLET ORAL
Qty: 30 | Refills: 0 | DISCHARGE

## 2020-04-10 RX ORDER — BUDESONIDE AND FORMOTEROL FUMARATE DIHYDRATE 160; 4.5 UG/1; UG/1
2 AEROSOL RESPIRATORY (INHALATION)
Refills: 0 | Status: DISCONTINUED | OUTPATIENT
Start: 2020-04-10 | End: 2020-04-11

## 2020-04-10 RX ORDER — AMLODIPINE BESYLATE 2.5 MG/1
0 TABLET ORAL
Qty: 90 | Refills: 0 | DISCHARGE

## 2020-04-10 RX ORDER — HYDROXYCHLOROQUINE SULFATE 200 MG
800 TABLET ORAL EVERY 24 HOURS
Refills: 0 | Status: COMPLETED | OUTPATIENT
Start: 2020-04-10 | End: 2020-04-10

## 2020-04-10 RX ORDER — METOPROLOL TARTRATE 50 MG
25 TABLET ORAL
Refills: 0 | Status: DISCONTINUED | OUTPATIENT
Start: 2020-04-10 | End: 2020-04-11

## 2020-04-10 RX ORDER — LEVOTHYROXINE SODIUM 125 MCG
25 TABLET ORAL DAILY
Refills: 0 | Status: DISCONTINUED | OUTPATIENT
Start: 2020-04-10 | End: 2020-04-11

## 2020-04-10 RX ORDER — ALBUTEROL 90 UG/1
3 AEROSOL, METERED ORAL
Qty: 0 | Refills: 0 | DISCHARGE

## 2020-04-10 RX ORDER — AMLODIPINE BESYLATE 2.5 MG/1
5 TABLET ORAL DAILY
Refills: 0 | Status: DISCONTINUED | OUTPATIENT
Start: 2020-04-10 | End: 2020-04-11

## 2020-04-10 RX ORDER — IPRATROPIUM/ALBUTEROL SULFATE 18-103MCG
3 AEROSOL WITH ADAPTER (GRAM) INHALATION EVERY 6 HOURS
Refills: 0 | Status: DISCONTINUED | OUTPATIENT
Start: 2020-04-10 | End: 2020-04-11

## 2020-04-10 RX ORDER — ATORVASTATIN CALCIUM 80 MG/1
0 TABLET, FILM COATED ORAL
Qty: 90 | Refills: 0 | DISCHARGE

## 2020-04-10 RX ORDER — PANTOPRAZOLE SODIUM 20 MG/1
0 TABLET, DELAYED RELEASE ORAL
Qty: 30 | Refills: 0 | DISCHARGE

## 2020-04-10 RX ORDER — ATORVASTATIN CALCIUM 80 MG/1
1 TABLET, FILM COATED ORAL
Qty: 0 | Refills: 0 | DISCHARGE

## 2020-04-10 RX ORDER — PANTOPRAZOLE SODIUM 20 MG/1
40 TABLET, DELAYED RELEASE ORAL
Refills: 0 | Status: DISCONTINUED | OUTPATIENT
Start: 2020-04-10 | End: 2020-04-11

## 2020-04-10 RX ORDER — ENOXAPARIN SODIUM 100 MG/ML
40 INJECTION SUBCUTANEOUS DAILY
Refills: 0 | Status: DISCONTINUED | OUTPATIENT
Start: 2020-04-10 | End: 2020-04-11

## 2020-04-10 RX ORDER — ASPIRIN/CALCIUM CARB/MAGNESIUM 324 MG
1 TABLET ORAL
Qty: 0 | Refills: 0 | DISCHARGE

## 2020-04-10 RX ORDER — HYDROXYCHLOROQUINE SULFATE 200 MG
400 TABLET ORAL EVERY 24 HOURS
Refills: 0 | Status: DISCONTINUED | OUTPATIENT
Start: 2020-04-11 | End: 2020-04-11

## 2020-04-10 RX ORDER — ALBUTEROL 90 UG/1
1 AEROSOL, METERED ORAL EVERY 4 HOURS
Refills: 0 | Status: DISCONTINUED | OUTPATIENT
Start: 2020-04-10 | End: 2020-04-11

## 2020-04-10 RX ORDER — LEVOTHYROXINE SODIUM 125 MCG
1 TABLET ORAL
Qty: 0 | Refills: 0 | DISCHARGE

## 2020-04-10 RX ORDER — FUROSEMIDE 40 MG
1 TABLET ORAL
Qty: 0 | Refills: 0 | DISCHARGE

## 2020-04-10 RX ORDER — CHLORHEXIDINE GLUCONATE 213 G/1000ML
1 SOLUTION TOPICAL
Refills: 0 | Status: DISCONTINUED | OUTPATIENT
Start: 2020-04-10 | End: 2020-04-11

## 2020-04-10 RX ORDER — ATORVASTATIN CALCIUM 80 MG/1
20 TABLET, FILM COATED ORAL AT BEDTIME
Refills: 0 | Status: DISCONTINUED | OUTPATIENT
Start: 2020-04-10 | End: 2020-04-11

## 2020-04-10 RX ORDER — MONTELUKAST 4 MG/1
10 TABLET, CHEWABLE ORAL DAILY
Refills: 0 | Status: DISCONTINUED | OUTPATIENT
Start: 2020-04-10 | End: 2020-04-11

## 2020-04-10 RX ORDER — FUROSEMIDE 40 MG
0 TABLET ORAL
Qty: 90 | Refills: 0 | DISCHARGE

## 2020-04-10 RX ORDER — AMLODIPINE BESYLATE 2.5 MG/1
1 TABLET ORAL
Qty: 0 | Refills: 0 | DISCHARGE

## 2020-04-10 RX ADMIN — Medication 25 MILLIGRAM(S): at 17:42

## 2020-04-10 RX ADMIN — ENOXAPARIN SODIUM 40 MILLIGRAM(S): 100 INJECTION SUBCUTANEOUS at 13:17

## 2020-04-10 RX ADMIN — Medication 1 TABLET(S): at 13:17

## 2020-04-10 RX ADMIN — ATORVASTATIN CALCIUM 20 MILLIGRAM(S): 80 TABLET, FILM COATED ORAL at 23:31

## 2020-04-10 RX ADMIN — BUDESONIDE AND FORMOTEROL FUMARATE DIHYDRATE 2 PUFF(S): 160; 4.5 AEROSOL RESPIRATORY (INHALATION) at 20:46

## 2020-04-10 RX ADMIN — BUDESONIDE AND FORMOTEROL FUMARATE DIHYDRATE 2 PUFF(S): 160; 4.5 AEROSOL RESPIRATORY (INHALATION) at 13:19

## 2020-04-10 RX ADMIN — MONTELUKAST 10 MILLIGRAM(S): 4 TABLET, CHEWABLE ORAL at 13:17

## 2020-04-10 RX ADMIN — Medication 800 MILLIGRAM(S): at 17:42

## 2020-04-10 NOTE — ED ADULT NURSE NOTE - OBJECTIVE STATEMENT
Patient presents AAOx4 complaining of shortness of breath x2-3 hours. She reports woke up suddenly and felt short of breath. She reports history of COPD and CHF. Respirations easy and unlabored. Lung sounds diminished upon auscultation. Denies chest pain, fever, or chills. She reports dry nonproductive cough that worsens shortness of breath. Reports abdominal "fullness" and "pressure". Denies nausea, vomiting, diarrhea, or constipation. Continuous cardiac monitor, blood pressure, and oxygen saturation monitoring applied. Vital signs stable. Patient denies need for assistance, call bell in reach, will continue to monitor patient. Patient presents AAOx4 complaining of shortness of breath x2-3 hours. She reports woke up suddenly and felt short of breath. She reports history of COPD and CHF. Respirations easy and unlabored. Lung sounds diminished upon auscultation. Denies chest pain, fever, or chills. She reports dry nonproductive cough that worsens shortness of breath. Reports abdominal "fullness" and "pressure". Denies nausea, vomiting, diarrhea, or constipation. Continuous cardiac monitor, blood pressure, and oxygen saturation monitoring applied. Vital signs stable. Pt arrived on nonrebreather at 15 lpm, but nasal cannula applied at 4 lpm. Oxygen saturation is 96%. Patient denies need for assistance, call bell in reach, will continue to monitor patient.

## 2020-04-10 NOTE — ED PROVIDER NOTE - PHYSICAL EXAMINATION
Gen: NAD, AOx3  Head: NCAT  HEENT: PERRL, oral mucosa moist, normal conjunctiva, oropharynx clear without exudate or erythema  Lung: no respiratory distress, BLL rhonchi  CV: normal s1/s2, rrr, Normal perfusion, pulses 2+ throughout  Abd: soft, NTND, no CVA tenderness  Genitourinary: no pelvic tenderness  MSK: No edema, no visible deformities, full range of motion in all 4 extremities  Neuro: No focal neurologic deficits  Skin: No rash   Psych: normal affect

## 2020-04-10 NOTE — H&P ADULT - HISTORY OF PRESENT ILLNESS
79 yo female with PMHx of HFpEF, COPD not on O2, HTN, GERD, mitral regurgitation s/p Mitraclip (2018, EF 67% 1/2020), hypothyroidism presenting with progressive SOB for the past day. The SOB acutely worsened so EMS was called. Upon arrival of EMS, her SpO2 was 59% and improved to 99% on NRB. Denied fever, chills, n/v/d/c, cp,  pleuritic cp, palpitations, diaphoresis, cough, wheezing, hemoptysis, HA, blurry vision, dizziness, lightheadedness, numbness, tingling, neck pain, neck stiffness, abdominal pain, melena, BRBPR, hematuria, urinary incontinace, trauma, calf pain/swelling/erythema, sick contacts, recent travel or rash.    In ED, bp 152/83  HR  73   RR  28  99 % on NRB 15L O2 T 98.3F.  CXR showed b/l infiltrates. D-dimer was 444. EKG showed LAE, LVH and QTc 478ms.

## 2020-04-10 NOTE — ED ADULT NURSE NOTE - NSIMPLEMENTINTERV_GEN_ALL_ED
Implemented All Universal Safety Interventions:  [place] to call system. Call bell, personal items and telephone within reach. Instruct patient to call for assistance. Room bathroom lighting operational. Non-slip footwear when patient is off stretcher. Physically safe environment: no spills, clutter or unnecessary equipment. Stretcher in lowest position, wheels locked, appropriate side rails in place.

## 2020-04-10 NOTE — ED ADULT NURSE REASSESSMENT NOTE - NS ED NURSE REASSESS COMMENT FT1
IV established. Blood specimens drawn and sent to lab, results now pending. EKG completed by EKG tech. Chest X-ray pending. Oxygen saturation remains 95%-97% on 4 lpm via nasal cannula. Patient being ruled out for COVID-19. Airborne, contact, and droplet precautions being implemented. Patient denies need for assistance, call bell in reach, will continue to monitor patient.

## 2020-04-10 NOTE — ED ADULT TRIAGE NOTE - CHIEF COMPLAINT QUOTE
81 yo f BIBA for SOb upon EMS arrival  patient was noted with o2 saturation of 59%. NRB brought up the saturation to 99% PMH CHF. 2 SL nitro administered in the field. Patient comfortable appearing at this time.

## 2020-04-10 NOTE — ED PROVIDER NOTE - NS ED ROS FT
Constitutional: (-) fever  Eyes/ENT: (-) visual changes   Cardiovascular: (-) chest pain, (-) syncope  Respiratory: (-) cough, (+) shortness of breath  Gastrointestinal: (-) vomiting, (-) diarrhea  Genitourinary: (-) dysuria, (-) hesitancy, (-) frequency   Musculoskeletal: (-) neck pain, (-) back pain, (-) joint pain  Integumentary: (-) rash, (-) edema  Neurological: (-) headache, (-) altered mental status  Allergic/Immunologic: (-) pruritus

## 2020-04-10 NOTE — ED ADULT NURSE NOTE - CHPI ED NUR SYMPTOMS POS
DIFFICULTY BREATHING/SHORTNESS OF BREATH W Plasty Text: The lesion was extirpated to the level of the fat with a #15 scalpel blade.  Given the location of the defect, shape of the defect and the proximity to free margins a W-plasty was deemed most appropriate for repair.  Using a sterile surgical marker, the appropriate transposition arms of the W-plasty were drawn incorporating the defect and placing the expected incisions within the relaxed skin tension lines where possible.    The area thus outlined was incised deep to adipose tissue with a #15 scalpel blade.  The skin margins were undermined to an appropriate distance in all directions utilizing iris scissors.  The opposing transposition arms were then transposed into place in opposite direction and anchored with interrupted buried subcutaneous sutures.

## 2020-04-10 NOTE — H&P ADULT - ASSESSMENT
81 yo female with PMHx of HFpEF, COPD not on O2, HTN, mitral regurgitation s/p Mitraclip (2018, EF 67% 1/2020), hypothyroidism, GERD presenting with progressive SOB for the past day.     Progressive SOB due to suspected COVID-19   -admit to medicine  -CXR: b/l infiltrates  -start hydroxychloroquine. Monitor QTc., Currently 456ms  -f/u CRP, ferritin, procalcitonin  -monitor SpO2  -supplemental O2 PRN    HFpEF  -TTE(1/2020); EF 67%, severe PAH  -c/w lasix, enalapril    COPD not on O2  -c/w nebs    HTN  -monitor bp  -c/w enalapril, amlodipine    Hypothyroidism  -stable  -thyromegaly on exam  -c/w synthroid    GERD  -stable  -c/w protonix    MR s/p Mitraclip(2018)  -stable  -EF 67% 1/2020  -outpatient cardiology follow up    Dispo  -from home    DVT ppx  GI ppx  CHG 4% daily and PRN  OOBTC  DASH/TLC   FULL CODE 79 yo female with PMHx of HFpEF, COPD not on O2, HTN, mitral regurgitation s/p Mitraclip (2018, EF 67% 1/2020), hypothyroidism, GERD presenting with progressive SOB for the past day.     Progressive SOB due to suspected COVID-19   -admit to medicine  -f/u COVID swab  -CXR: b/l infiltrates  -start hydroxychloroquine. Monitor QTc., Currently 456ms  -f/u CRP, ferritin, procalcitonin  -monitor SpO2  -supplemental O2 PRN    HFpEF  -TTE(1/2020); EF 67%, severe PAH  -c/w lasix, enalapril    COPD not on O2  -c/w nebs    HTN  -monitor bp  -c/w enalapril, amlodipine    Hypothyroidism  -stable  -thyromegaly on exam  -c/w synthroid    GERD  -stable  -c/w protonix    MR s/p Mitraclip (2018)  -stable  -EF 67% 1/2020  -  -outpatient cardiology follow up    Dispo  -from home    DVT ppx  GI ppx  CHG 4% daily and PRN  OOBTC  DASH/TLC   FULL CODE 81 yo female with PMHx of HFpEF, COPD not on O2, HTN, mitral regurgitation s/p Mitraclip (2018, EF 67% 1/2020), hypothyroidism, GERD presenting with progressive SOB for 1 day prior to presentation. She states that she has no known COVID exposures and this feels like her usual COPD.     Progressive SOB due to Mild COPD exacerbation  -admit to medicine  COVID swab negative  -CXR: b/l infiltrates ---similar findings present on CT chest from January, 2020  -f/u CRP, ferritin, procalcitonin  -monitor SpO2 - check at rest and on ambulation  - seen by ID, no further intervention at this point    HFpEF  -TTE(1/2020); EF 67%, severe PAH  -c/w lasix, enalapril  - currently euvolemic     COPD not on O2  -c/w nebs, they seem to be helping    HTN  -monitor bp  -c/w enalapril, amlodipine    Hypothyroidism  -stable  -thyromegaly on exam  -c/w synthroid and outpatient monitoring    GERD  -stable  -c/w protonix    MR s/p Mitraclip (2018)  -stable  -EF 67% 1/2020  -  -outpatient cardiology follow up    #Progress Note Handoff  Pending (specify):  Pulse ox on ambulation  Family discussion: Plan of care discussed with patient, aware and agreeable   Disposition:  home, possibly today PM or tomorrow    Kiley Hector MD  s. [phone]

## 2020-04-10 NOTE — H&P ADULT - NSHPLABSRESULTS_GEN_ALL_CORE
Labs:                        10.7   5.14  )-----------( 197      ( 10 Apr 2020 04:30 )             33.7                                   04-10    138  |  100  |  12  ----------------------------<  150<H>  3.9   |  28  |  0.8    Ca    9.1      10 Apr 2020 04:30    TPro  7.4  /  Alb  4.1  /  TBili  0.3  /  DBili  x   /  AST  21  /  ALT  15  /  AlkPhos  60  04-10    LIVER FUNCTIONS - ( 10 Apr 2020 04:30 )  Alb: 4.1 g/dL / Pro: 7.4 g/dL / ALK PHOS: 60 U/L / ALT: 15 U/L / AST: 21 U/L / GGT: x                                        Lactate, Blood: 1.7 mmol/L (04-10-20 @ 04:30)    Serum Pro-Brain Natriuretic Peptide: 836 pg/mL (04-10-20 @ 04:30)     Imaging:    CXR: b/l infiltrates    12 Lead ECG:   Ventricular Rate 66 BPM  Atrial Rate 66 BPM  P-R Interval 148 ms  QRS Duration 106 ms  Q-T Interval 456 ms  QTC Calculation(Bezet) 478 ms  P Axis 52 degrees  R Axis -14 degrees  T Axis 49 degrees    Diagnosis Line Normal sinus rhythm  Possible Left atrial enlargement  Left ventricular hypertrophy  Abnormal ECG    Confirmed by JE BROOKE MD ([id number]) on 4/10/2020 6:07:39 AM (04-10-20 @ 04:33)    Echo:  < from: Transthoracic Echocardiogram (01.19.20 @ 11:54) >    Summary:   1. Normal global left ventricular systolic function.   2. LV Ejection Fraction by Burton's Method with a biplane EF of 67 %.   3. Mild left ventricular hypertrophy.   4. Mildly increased LV wall thickness.   5. Normal left ventricular internal cavity size.   6. Spectral Doppler shows restrictive pattern of left ventricular myocardial filling (Grade III diastolic dysfunction).   7. Bowing of atrial septum left to right suggestd elevated LA pressure.   8. Mild thickening of the anterior and posterior mitral valve leaflets.   9. Severe mitral valve regurgitation.  10. Sclerotic aortic valve with normal opening.  11. Estimated pulmonary artery systolic pressure is 65.7 mmHg assuming a right atrial pressure of 10 mmHg, which is consistent with severe pulmonary hypertension.  12. LA volume Index is 47.4 ml/m² ml/m2.    < end of copied text >

## 2020-04-10 NOTE — H&P ADULT - NSHPPHYSICALEXAM_GEN_ALL_CORE
GENERAL: NAD  HEAD:  NCAT  EYES: EOMI, PERRL, conjunctiva clear  ENMT: No tonsillar erythema, exudates, or enlargement; Moist mucous membranes  NECK: Supple, thyromegaly  NERVOUS SYSTEM: AAOX3  CHEST/LUNG: course b/l bs  HEART: +s1s2 RRR no m/g/r  ABDOMEN: soft, NT/ND (+) bs  EXTREMITIES:  2+ Peripheral Pulses, No c/c/e  LYMPH: No lymphadenopathy noted  SKIN: No rashes or lesions ICU Vital Signs Last 24 Hrs  T(C): 35.9 (11 Apr 2020 04:25), Max: 36.4 (10 Apr 2020 21:40)  T(F): 96.7 (11 Apr 2020 04:25), Max: 97.6 (10 Apr 2020 21:40)  HR: 79 (11 Apr 2020 04:25) (59 - 79)  BP: 150/82 (11 Apr 2020 03:29) (145/65 - 177/82)  RR: 18 (11 Apr 2020 04:25) (18 - 18)  SpO2: 97% (11 Apr 2020 08:21) (94% - 100%)      GENERAL: NAD  HEAD:  NCAT  EYES: EOMI, conjunctiva clear  ENMT: No tonsillar erythema, exudates, or enlargement; Moist mucous membranes  NECK: Supple, thyromegaly  NERVOUS SYSTEM: AAOX3  CHEST/LUNG: course b/l bs  HEART: +s1s2 RRR no m/g/r  ABDOMEN: soft, NT/ND (+) bs  EXTREMITIES:  2+ Peripheral Pulses, No c/c/e  LYMPH: No lymphadenopathy noted  SKIN: No rashes or lesions

## 2020-04-10 NOTE — ED ADULT NURSE REASSESSMENT NOTE - NS ED NURSE REASSESS COMMENT FT1
Blood work still not showing receiving by lab. Called lab and they report "we are very backed up". Patient resting comfortably on stretcher in no acute distress. Respirations easy and unlabored. She appears to be in no respiratory distress. Chest X-ray completed at bedside, results pending. Continuous cardiac monitor, blood pressure, and oxygen saturation monitoring remains applied. Oxygen saturation is 98% on 2 lpm via nasal cannula. Patient denies need for assistance, call bell in reach, will continue to monitor patient.

## 2020-04-10 NOTE — ED ADULT NURSE REASSESSMENT NOTE - NS ED NURSE REASSESS COMMENT FT1
Endorsed patient to Lizzy MONTOYA to continue care of patient. Patient AAOX4 in no acute distress and blood work now in process at time of transfer of care.

## 2020-04-11 ENCOUNTER — TRANSCRIPTION ENCOUNTER (OUTPATIENT)
Age: 80
End: 2020-04-11

## 2020-04-11 VITALS — OXYGEN SATURATION: 92 %

## 2020-04-11 LAB
ANION GAP SERPL CALC-SCNC: 13 MMOL/L — SIGNIFICANT CHANGE UP (ref 7–14)
BASOPHILS # BLD AUTO: 0.06 K/UL — SIGNIFICANT CHANGE UP (ref 0–0.2)
BASOPHILS NFR BLD AUTO: 0.8 % — SIGNIFICANT CHANGE UP (ref 0–1)
BUN SERPL-MCNC: 8 MG/DL — LOW (ref 10–20)
CALCIUM SERPL-MCNC: 9.4 MG/DL — SIGNIFICANT CHANGE UP (ref 8.5–10.1)
CHLORIDE SERPL-SCNC: 102 MMOL/L — SIGNIFICANT CHANGE UP (ref 98–110)
CO2 SERPL-SCNC: 27 MMOL/L — SIGNIFICANT CHANGE UP (ref 17–32)
CREAT SERPL-MCNC: 0.8 MG/DL — SIGNIFICANT CHANGE UP (ref 0.7–1.5)
EOSINOPHIL # BLD AUTO: 0.17 K/UL — SIGNIFICANT CHANGE UP (ref 0–0.7)
EOSINOPHIL NFR BLD AUTO: 2.4 % — SIGNIFICANT CHANGE UP (ref 0–8)
GLUCOSE SERPL-MCNC: 100 MG/DL — HIGH (ref 70–99)
HCT VFR BLD CALC: 31.9 % — LOW (ref 37–47)
HGB BLD-MCNC: 9.9 G/DL — LOW (ref 12–16)
IMM GRANULOCYTES NFR BLD AUTO: 0.4 % — HIGH (ref 0.1–0.3)
LYMPHOCYTES # BLD AUTO: 1.05 K/UL — LOW (ref 1.2–3.4)
LYMPHOCYTES # BLD AUTO: 14.5 % — LOW (ref 20.5–51.1)
MAGNESIUM SERPL-MCNC: 1.9 MG/DL — SIGNIFICANT CHANGE UP (ref 1.8–2.4)
MCHC RBC-ENTMCNC: 29.9 PG — SIGNIFICANT CHANGE UP (ref 27–31)
MCHC RBC-ENTMCNC: 31 G/DL — LOW (ref 32–37)
MCV RBC AUTO: 96.4 FL — SIGNIFICANT CHANGE UP (ref 81–99)
MONOCYTES # BLD AUTO: 0.73 K/UL — HIGH (ref 0.1–0.6)
MONOCYTES NFR BLD AUTO: 10.1 % — HIGH (ref 1.7–9.3)
NEUTROPHILS # BLD AUTO: 5.18 K/UL — SIGNIFICANT CHANGE UP (ref 1.4–6.5)
NEUTROPHILS NFR BLD AUTO: 71.8 % — SIGNIFICANT CHANGE UP (ref 42.2–75.2)
NRBC # BLD: 0 /100 WBCS — SIGNIFICANT CHANGE UP (ref 0–0)
PLATELET # BLD AUTO: 192 K/UL — SIGNIFICANT CHANGE UP (ref 130–400)
POTASSIUM SERPL-MCNC: 3.9 MMOL/L — SIGNIFICANT CHANGE UP (ref 3.5–5)
POTASSIUM SERPL-SCNC: 3.9 MMOL/L — SIGNIFICANT CHANGE UP (ref 3.5–5)
RBC # BLD: 3.31 M/UL — LOW (ref 4.2–5.4)
RBC # FLD: 13.4 % — SIGNIFICANT CHANGE UP (ref 11.5–14.5)
SODIUM SERPL-SCNC: 142 MMOL/L — SIGNIFICANT CHANGE UP (ref 135–146)
WBC # BLD: 7.22 K/UL — SIGNIFICANT CHANGE UP (ref 4.8–10.8)
WBC # FLD AUTO: 7.22 K/UL — SIGNIFICANT CHANGE UP (ref 4.8–10.8)

## 2020-04-11 PROCEDURE — 99238 HOSP IP/OBS DSCHRG MGMT 30/<: CPT

## 2020-04-11 RX ADMIN — Medication 25 MILLIGRAM(S): at 16:52

## 2020-04-11 RX ADMIN — Medication 25 MICROGRAM(S): at 06:10

## 2020-04-11 RX ADMIN — AMLODIPINE BESYLATE 5 MILLIGRAM(S): 2.5 TABLET ORAL at 06:10

## 2020-04-11 RX ADMIN — Medication 10 MILLIGRAM(S): at 06:10

## 2020-04-11 RX ADMIN — Medication 40 MILLIGRAM(S): at 06:10

## 2020-04-11 RX ADMIN — PANTOPRAZOLE SODIUM 40 MILLIGRAM(S): 20 TABLET, DELAYED RELEASE ORAL at 06:10

## 2020-04-11 RX ADMIN — ENOXAPARIN SODIUM 40 MILLIGRAM(S): 100 INJECTION SUBCUTANEOUS at 11:24

## 2020-04-11 RX ADMIN — Medication 25 MILLIGRAM(S): at 06:10

## 2020-04-11 RX ADMIN — CHLORHEXIDINE GLUCONATE 1 APPLICATION(S): 213 SOLUTION TOPICAL at 06:09

## 2020-04-11 RX ADMIN — BUDESONIDE AND FORMOTEROL FUMARATE DIHYDRATE 2 PUFF(S): 160; 4.5 AEROSOL RESPIRATORY (INHALATION) at 11:23

## 2020-04-11 RX ADMIN — MONTELUKAST 10 MILLIGRAM(S): 4 TABLET, CHEWABLE ORAL at 11:24

## 2020-04-11 RX ADMIN — Medication 1 TABLET(S): at 11:25

## 2020-04-11 NOTE — DISCHARGE NOTE NURSING/CASE MANAGEMENT/SOCIAL WORK - PATIENT PORTAL LINK FT
You can access the FollowMyHealth Patient Portal offered by [facility] by registering at the following website: [email or web address]. By joining [facility]’s FollowMyHealth portal, you will also be able to view your health information using other applications (apps) compatible with our system.

## 2020-04-11 NOTE — CONSULT NOTE ADULT - ASSESSMENT
ASSESSMENT  81 yo female with PMHx of HFpEF, COPD not on O2, HTN, GERD, mitral regurgitation s/p Mitraclip (2018, EF 67% 1/2020), hypothyroidism presenting with progressive SOB for the past day. Upon arrival of EMS, her SpO2 was 59% and improved to 99% on NRB. Now 97% on RA.      IMPRESSION  # lower suspicion for COVID-19 PNA     afebrile     WBC 5, low-normal abs lymphs (low in 1/2020)    CXR some opacities R>L. CT Chest 1/2020 with R sided findings/GGOs    Serum Pro-Brain Natriuretic Peptide: 836 pg/mL (04.10.20 @ 04:30)    Procalcitonin, Serum: 0.03 ng/mL (04-10-20 @ 04:30)    COVID-19 PCR: NotDetec (04-10-20 @ 04:00)  #Sepsis ruled out on admission   #Obesity BMI (kg/m2): 30.8    RECOMMENDATIONS  - no further testing unless fever or worsening respiratory status  - d/c home when able    Spectra [phone]

## 2020-04-11 NOTE — DISCHARGE NOTE PROVIDER - HOSPITAL COURSE
81 yo female with PMHx of HFpEF, COPD not on O2, HTN, GERD, mitral regurgitation s/p Mitraclip (2018, EF 67% 1/2020), hypothyroidism presenting with progressive SOB for the past day. The SOB acutely worsened so EMS was called. Upon arrival of EMS, her SpO2 was 59% and improved to 99% on NRB. Denied fever, chills, n/v/d/c, cp,  pleuritic cp, palpitations, diaphoresis, cough, wheezing, hemoptysis, HA, blurry vision, dizziness, lightheadedness, numbness, tingling, neck pain, neck stiffness, abdominal pain, melena, BRBPR, hematuria, urinary incontinace, trauma, calf pain/swelling/erythema, sick contacts, recent travel or rash. In the ED, she received nebulizer treatments and returned to baseline. She was tested for covid and was negative, she ambulated around the room without difficulty or desaturations. Patient is medically stable for discharge home. She is aware and agreeable with the plan. 81 yo female with PMHx of HFpEF, COPD not on O2, HTN, GERD, mitral regurgitation s/p Mitraclip (2018, EF 67% 1/2020), hypothyroidism presenting with progressive SOB for the past day. The SOB acutely worsened so EMS was called. Upon arrival of EMS, her SpO2 was 59% and improved to 99% on NRB. Denied fever, chills, n/v/d/c, cp,  pleuritic cp, palpitations, diaphoresis, cough, wheezing, hemoptysis, HA, blurry vision, dizziness, lightheadedness, numbness, tingling, neck pain, neck stiffness, abdominal pain, melena, BRBPR, hematuria, urinary incontinace, trauma, calf pain/swelling/erythema, sick contacts, recent travel or rash. In the ED, she received nebulizer treatments and returned to baseline. She was tested for covid and was negative, she ambulated around the room without difficulty, CP or shortness of breath. Her saturation was 92%.     Patient is medically stable for discharge home. She is aware and agreeable with the plan.

## 2020-04-11 NOTE — DISCHARGE NOTE PROVIDER - CARE PROVIDER_API CALL
Jas Renner)  Internal Medicine  [address], [address]  [place] [address]  Phone: [phone]  Fax: [phone]  Follow Up Time:

## 2020-04-11 NOTE — DISCHARGE NOTE PROVIDER - NSDCCPCAREPLAN_GEN_ALL_CORE_FT
PRINCIPAL DISCHARGE DIAGNOSIS  Diagnosis: COPD exacerbation  Assessment and Plan of Treatment: please use your inhalers as prescribed and follow up with your doctor promptly upon discharge

## 2020-04-11 NOTE — CONSULT NOTE ADULT - SUBJECTIVE AND OBJECTIVE BOX
SCOTTY HANNON  80y, Female  Allergy: No Known Allergies      CHIEF COMPLAINT: SOB (10 Apr 2020 07:36)      LOS  1d    HPI:  81 yo female with PMHx of HFpEF, COPD not on O2, HTN, GERD, mitral regurgitation s/p Mitraclip (2018, EF 67% 1/2020), hypothyroidism presenting with progressive SOB for the past day. The SOB acutely worsened so EMS was called. Upon arrival of EMS, her SpO2 was 59% and improved to 99% on NRB. Denied fever, chills, n/v/d/c, cp,  pleuritic cp, palpitations, diaphoresis, cough, wheezing, hemoptysis, HA, blurry vision, dizziness, lightheadedness, numbness, tingling, neck pain, neck stiffness, abdominal pain, melena, BRBPR, hematuria, urinary incontinace, trauma, calf pain/swelling/erythema, sick contacts, recent travel or rash.    In ED, bp 152/83  HR  73   RR  28  99 % on NRB 15L O2 T 98.3F.  CXR showed b/l infiltrates. D-dimer was 444. EKG showed LAE, LVH and QTc 478ms. (10 Apr 2020 07:36)      INFECTIOUS DISEASE HISTORY:  SARS-CoV-2 pcr NEGATIVE    PAST MEDICAL & SURGICAL HISTORY:  Other specified hypothyroidism  Severe mitral regurgitation  COPD (chronic obstructive pulmonary disease)  CHF (congestive heart failure)  HTN (hypertension)  History of cholecystectomy      FAMILY HISTORY  No pertinent family history in first degree relatives  No pertinent family history in first degree relatives      SOCIAL HISTORY  Social History:  former smoker  (-) alcohol use  (-) illicit drug use (10 Apr 2020 07:36)        ROS  General: Denies rigors, nightsweats  HEENT: Denies headache, rhinorrhea, sore throat, eye pain  CV: Denies CP, palpitations  PULM: as noted above   GI: Denies hematemesis, hematochezia, melena  : Denies discharge, hematuria  MSK: Denies arthralgias, myalgias  SKIN: Denies rash, lesions  NEURO: Denies paresthesias, weakness  PSYCH: Denies depression, anxiety    VITALS:  T(F): 96.7, Max: 97.6 (04-10-20 @ 21:40)  HR: 79  BP: 150/82  RR: 18Vital Signs Last 24 Hrs  T(C): 35.9 (11 Apr 2020 04:25), Max: 36.4 (10 Apr 2020 21:40)  T(F): 96.7 (11 Apr 2020 04:25), Max: 97.6 (10 Apr 2020 21:40)  HR: 79 (11 Apr 2020 04:25) (59 - 79)  BP: 150/82 (11 Apr 2020 03:29) (145/65 - 177/82)  BP(mean): --  RR: 18 (11 Apr 2020 04:25) (18 - 18)  SpO2: 97% (11 Apr 2020 08:21) (94% - 100%)    PHYSICAL EXAM:  Gen: on RA 97%  HEENT: NCAT  Resp: b/l chest expansion  Neuro: nonfocal     TESTS & MEASUREMENTS:                        9.9    7.22  )-----------( 192      ( 11 Apr 2020 05:44 )             31.9     04-11    142  |  102  |  8<L>  ----------------------------<  100<H>  3.9   |  27  |  0.8    Ca    9.4      11 Apr 2020 05:44  Mg     1.9     04-11    TPro  7.4  /  Alb  4.1  /  TBili  0.3  /  DBili  x   /  AST  21  /  ALT  15  /  AlkPhos  60  04-10    eGFR if Non African American: 70 mL/min/1.73M2 (04-11-20 @ 05:44)  eGFR if : 81 mL/min/1.73M2 (04-11-20 @ 05:44)    LIVER FUNCTIONS - ( 10 Apr 2020 04:30 )  Alb: 4.1 g/dL / Pro: 7.4 g/dL / ALK PHOS: 60 U/L / ALT: 15 U/L / AST: 21 U/L / GGT: x               Culture - Blood (collected 01-18-20 @ 05:58)  Source: .Blood None  Final Report (01-23-20 @ 18:00):    No growth at 5 days.        Lactate, Blood: 1.7 mmol/L (04-10-20 @ 04:30)      INFECTIOUS DISEASES TESTING  Procalcitonin, Serum: 0.03 ng/mL (04-10-20 @ 04:30)  COVID-19 PCR: NotDetec (04-10-20 @ 04:00)  Legionella Antigen, Urine: Negative (01-20-20 @ 17:50)  Streptococcus Pneumoniae Ag Urine: Negative (01-20-20 @ 17:50)  MRSA PCR Result.: Negative (01-18-20 @ 10:36)      RADIOLOGY & ADDITIONAL TESTS:  I have personally reviewed the last Chest xray  CXR      CT      CARDIOLOGY TESTING  12 Lead ECG:   Ventricular Rate 66 BPM    Atrial Rate 66 BPM    P-R Interval 148 ms    QRS Duration 106 ms    Q-T Interval 456 ms    QTC Calculation(Bezet) 478 ms    P Axis 52 degrees    R Axis -14 degrees    T Axis 49 degrees    Diagnosis Line Normal sinus rhythm  Possible Left atrial enlargement  Left ventricular hypertrophy  Abnormal ECG    Confirmed by JE BROOKE MD ([id number]) on 4/10/2020 6:07:39 AM (04-10-20 @ 04:33)      MEDICATIONS  amLODIPine   Tablet 5  atorvastatin 20  budesonide 160 MICROgram(s)/formoterol 4.5 MICROgram(s) Inhaler 2  chlorhexidine 4% Liquid 1  enalapril 10  enoxaparin Injectable 40  furosemide    Tablet 40  levothyroxine 25  metoprolol tartrate 25  montelukast 10  multivitamin 1  pantoprazole    Tablet 40      Weight  Weight (kg): 76.3 (04-10-20 @ 21:40)    ANTIBIOTICS:      ALLERGIES:  No Known Allergies

## 2020-04-11 NOTE — DISCHARGE NOTE PROVIDER - NSDCMRMEDTOKEN_GEN_ALL_CORE_FT
albuterol 2.5 mg/3 mL (0.083%) inhalation solution: 3 milliliter(s) inhaled every 2 hours, As Needed  amLODIPine 5 mg oral tablet: 1 tab(s) orally once a day  atorvastatin 20 mg oral tablet: 1 tab(s) orally once a day  Breo Ellipta 100 mcg-25 mcg/inh inhalation powder: 1 puff(s) inhaled once a day  enalapril 10 mg oral tablet: 1 tab(s) orally once a day  furosemide 40 mg oral tablet: 1 tab(s) orally once a day  levothyroxine 25 mcg (0.025 mg) oral tablet: 1 tab(s) orally once a day  metoprolol tartrate 25 mg oral tablet: 1 tab(s) orally 2 times a day  MONTELUKAST  TAB 10MG:   Multiple Vitamins oral tablet: 1 tab(s) orally once a day  PANTOPRAZOLE TAB 40MG:   ProAir HFA 90 mcg/inh inhalation aerosol: 1 puff(s) inhaled every 4 hours, As needed, Bronchospasm albuterol 2.5 mg/3 mL (0.083%) inhalation solution: 3 milliliter(s) inhaled every 2 hours, As Needed  amLODIPine 5 mg oral tablet: 1 tab(s) orally once a day  atorvastatin 20 mg oral tablet: 1 tab(s) orally once a day  Breo Ellipta 100 mcg-25 mcg/inh inhalation powder: 1 puff(s) inhaled once a day  Deltasone 20 mg oral tablet: 2 tab(s) orally once a day   enalapril 10 mg oral tablet: 1 tab(s) orally once a day  furosemide 40 mg oral tablet: 1 tab(s) orally once a day  levothyroxine 25 mcg (0.025 mg) oral tablet: 1 tab(s) orally once a day  metoprolol tartrate 25 mg oral tablet: 1 tab(s) orally 2 times a day  MONTELUKAST  TAB 10MG:   Multiple Vitamins oral tablet: 1 tab(s) orally once a day  PANTOPRAZOLE TAB 40MG:   ProAir HFA 90 mcg/inh inhalation aerosol: 1 puff(s) inhaled every 4 hours, As needed, Bronchospasm

## 2020-04-14 DIAGNOSIS — Z87.891 PERSONAL HISTORY OF NICOTINE DEPENDENCE: ICD-10-CM

## 2020-04-14 DIAGNOSIS — Z95.2 PRESENCE OF PROSTHETIC HEART VALVE: ICD-10-CM

## 2020-04-14 DIAGNOSIS — R06.02 SHORTNESS OF BREATH: ICD-10-CM

## 2020-04-14 DIAGNOSIS — J44.1 CHRONIC OBSTRUCTIVE PULMONARY DISEASE WITH (ACUTE) EXACERBATION: ICD-10-CM

## 2020-04-14 DIAGNOSIS — E66.9 OBESITY, UNSPECIFIED: ICD-10-CM

## 2020-04-14 DIAGNOSIS — I50.32 CHRONIC DIASTOLIC (CONGESTIVE) HEART FAILURE: ICD-10-CM

## 2020-04-14 DIAGNOSIS — K21.9 GASTRO-ESOPHAGEAL REFLUX DISEASE WITHOUT ESOPHAGITIS: ICD-10-CM

## 2020-04-14 DIAGNOSIS — I34.0 NONRHEUMATIC MITRAL (VALVE) INSUFFICIENCY: ICD-10-CM

## 2020-04-14 DIAGNOSIS — E03.9 HYPOTHYROIDISM, UNSPECIFIED: ICD-10-CM

## 2020-04-14 DIAGNOSIS — I11.0 HYPERTENSIVE HEART DISEASE WITH HEART FAILURE: ICD-10-CM

## 2020-04-15 ENCOUNTER — INPATIENT (INPATIENT)
Facility: HOSPITAL | Age: 80
LOS: 0 days | Discharge: HOME | End: 2020-04-16
Attending: INTERNAL MEDICINE | Admitting: INTERNAL MEDICINE
Payer: MEDICARE

## 2020-04-15 VITALS
HEART RATE: 65 BPM | WEIGHT: 169.98 LBS | HEIGHT: 62 IN | TEMPERATURE: 97 F | SYSTOLIC BLOOD PRESSURE: 155 MMHG | OXYGEN SATURATION: 92 % | RESPIRATION RATE: 24 BRPM | DIASTOLIC BLOOD PRESSURE: 79 MMHG

## 2020-04-15 DIAGNOSIS — Z98.890 OTHER SPECIFIED POSTPROCEDURAL STATES: Chronic | ICD-10-CM

## 2020-04-15 LAB
ALBUMIN SERPL ELPH-MCNC: 4.1 G/DL — SIGNIFICANT CHANGE UP (ref 3.5–5.2)
ALP SERPL-CCNC: 67 U/L — SIGNIFICANT CHANGE UP (ref 30–115)
ALT FLD-CCNC: 18 U/L — SIGNIFICANT CHANGE UP (ref 0–41)
ANION GAP SERPL CALC-SCNC: 10 MMOL/L — SIGNIFICANT CHANGE UP (ref 7–14)
APTT BLD: 31.4 SEC — SIGNIFICANT CHANGE UP (ref 27–39.2)
AST SERPL-CCNC: 29 U/L — SIGNIFICANT CHANGE UP (ref 0–41)
BASOPHILS # BLD AUTO: 0.05 K/UL — SIGNIFICANT CHANGE UP (ref 0–0.2)
BASOPHILS NFR BLD AUTO: 0.9 % — SIGNIFICANT CHANGE UP (ref 0–1)
BILIRUB SERPL-MCNC: 0.3 MG/DL — SIGNIFICANT CHANGE UP (ref 0.2–1.2)
BUN SERPL-MCNC: 14 MG/DL — SIGNIFICANT CHANGE UP (ref 10–20)
CALCIUM SERPL-MCNC: 9.3 MG/DL — SIGNIFICANT CHANGE UP (ref 8.5–10.1)
CHLORIDE SERPL-SCNC: 103 MMOL/L — SIGNIFICANT CHANGE UP (ref 98–110)
CO2 SERPL-SCNC: 27 MMOL/L — SIGNIFICANT CHANGE UP (ref 17–32)
CREAT SERPL-MCNC: 0.8 MG/DL — SIGNIFICANT CHANGE UP (ref 0.7–1.5)
CRP SERPL-MCNC: 0.12 MG/DL — SIGNIFICANT CHANGE UP (ref 0–0.4)
D DIMER BLD IA.RAPID-MCNC: 847 NG/ML DDU — HIGH (ref 0–230)
EOSINOPHIL # BLD AUTO: 0.15 K/UL — SIGNIFICANT CHANGE UP (ref 0–0.7)
EOSINOPHIL NFR BLD AUTO: 2.6 % — SIGNIFICANT CHANGE UP (ref 0–8)
FERRITIN SERPL-MCNC: 148 NG/ML — SIGNIFICANT CHANGE UP (ref 15–150)
GLUCOSE SERPL-MCNC: 104 MG/DL — HIGH (ref 70–99)
HCT VFR BLD CALC: 34.6 % — LOW (ref 37–47)
HGB BLD-MCNC: 11.1 G/DL — LOW (ref 12–16)
IMM GRANULOCYTES NFR BLD AUTO: 0.3 % — SIGNIFICANT CHANGE UP (ref 0.1–0.3)
INR BLD: 1.03 RATIO — SIGNIFICANT CHANGE UP (ref 0.65–1.3)
LYMPHOCYTES # BLD AUTO: 0.72 K/UL — LOW (ref 1.2–3.4)
LYMPHOCYTES # BLD AUTO: 12.6 % — LOW (ref 20.5–51.1)
MCHC RBC-ENTMCNC: 31.3 PG — HIGH (ref 27–31)
MCHC RBC-ENTMCNC: 32.1 G/DL — SIGNIFICANT CHANGE UP (ref 32–37)
MCV RBC AUTO: 97.5 FL — SIGNIFICANT CHANGE UP (ref 81–99)
MONOCYTES # BLD AUTO: 0.55 K/UL — SIGNIFICANT CHANGE UP (ref 0.1–0.6)
MONOCYTES NFR BLD AUTO: 9.6 % — HIGH (ref 1.7–9.3)
NEUTROPHILS # BLD AUTO: 4.24 K/UL — SIGNIFICANT CHANGE UP (ref 1.4–6.5)
NEUTROPHILS NFR BLD AUTO: 74 % — SIGNIFICANT CHANGE UP (ref 42.2–75.2)
NRBC # BLD: 0 /100 WBCS — SIGNIFICANT CHANGE UP (ref 0–0)
NT-PROBNP SERPL-SCNC: 572 PG/ML — HIGH (ref 0–300)
PLATELET # BLD AUTO: 211 K/UL — SIGNIFICANT CHANGE UP (ref 130–400)
POTASSIUM SERPL-MCNC: 4.9 MMOL/L — SIGNIFICANT CHANGE UP (ref 3.5–5)
POTASSIUM SERPL-SCNC: 4.9 MMOL/L — SIGNIFICANT CHANGE UP (ref 3.5–5)
PROCALCITONIN SERPL-MCNC: <0.02 NG/ML — SIGNIFICANT CHANGE UP (ref 0.02–0.1)
PROT SERPL-MCNC: 7.8 G/DL — SIGNIFICANT CHANGE UP (ref 6–8)
PROTHROM AB SERPL-ACNC: 11.9 SEC — SIGNIFICANT CHANGE UP (ref 9.95–12.87)
RBC # BLD: 3.55 M/UL — LOW (ref 4.2–5.4)
RBC # FLD: 13.3 % — SIGNIFICANT CHANGE UP (ref 11.5–14.5)
SARS-COV-2 RNA SPEC QL NAA+PROBE: SIGNIFICANT CHANGE UP
SODIUM SERPL-SCNC: 140 MMOL/L — SIGNIFICANT CHANGE UP (ref 135–146)
TROPONIN T SERPL-MCNC: <0.01 NG/ML — SIGNIFICANT CHANGE UP
TROPONIN T SERPL-MCNC: <0.01 NG/ML — SIGNIFICANT CHANGE UP
WBC # BLD: 5.73 K/UL — SIGNIFICANT CHANGE UP (ref 4.8–10.8)
WBC # FLD AUTO: 5.73 K/UL — SIGNIFICANT CHANGE UP (ref 4.8–10.8)

## 2020-04-15 PROCEDURE — 99285 EMERGENCY DEPT VISIT HI MDM: CPT

## 2020-04-15 PROCEDURE — 93010 ELECTROCARDIOGRAM REPORT: CPT

## 2020-04-15 PROCEDURE — 71275 CT ANGIOGRAPHY CHEST: CPT | Mod: 26

## 2020-04-15 PROCEDURE — 71045 X-RAY EXAM CHEST 1 VIEW: CPT | Mod: 26

## 2020-04-15 PROCEDURE — 99222 1ST HOSP IP/OBS MODERATE 55: CPT

## 2020-04-15 RX ORDER — IPRATROPIUM BROMIDE 0.2 MG/ML
1 SOLUTION, NON-ORAL INHALATION ONCE
Refills: 0 | Status: COMPLETED | OUTPATIENT
Start: 2020-04-15 | End: 2020-04-15

## 2020-04-15 RX ORDER — HYDROXYCHLOROQUINE SULFATE 200 MG
800 TABLET ORAL EVERY 24 HOURS
Refills: 0 | Status: DISCONTINUED | OUTPATIENT
Start: 2020-04-15 | End: 2020-04-16

## 2020-04-15 RX ORDER — ENOXAPARIN SODIUM 100 MG/ML
40 INJECTION SUBCUTANEOUS EVERY 12 HOURS
Refills: 0 | Status: DISCONTINUED | OUTPATIENT
Start: 2020-04-15 | End: 2020-04-16

## 2020-04-15 RX ORDER — LEVOTHYROXINE SODIUM 125 MCG
25 TABLET ORAL DAILY
Refills: 0 | Status: DISCONTINUED | OUTPATIENT
Start: 2020-04-15 | End: 2020-04-16

## 2020-04-15 RX ORDER — BUDESONIDE AND FORMOTEROL FUMARATE DIHYDRATE 160; 4.5 UG/1; UG/1
2 AEROSOL RESPIRATORY (INHALATION)
Refills: 0 | Status: DISCONTINUED | OUTPATIENT
Start: 2020-04-15 | End: 2020-04-16

## 2020-04-15 RX ORDER — CHLORHEXIDINE GLUCONATE 213 G/1000ML
1 SOLUTION TOPICAL
Refills: 0 | Status: DISCONTINUED | OUTPATIENT
Start: 2020-04-15 | End: 2020-04-16

## 2020-04-15 RX ORDER — ATORVASTATIN CALCIUM 80 MG/1
20 TABLET, FILM COATED ORAL AT BEDTIME
Refills: 0 | Status: DISCONTINUED | OUTPATIENT
Start: 2020-04-15 | End: 2020-04-16

## 2020-04-15 RX ORDER — IPRATROPIUM/ALBUTEROL SULFATE 18-103MCG
3 AEROSOL WITH ADAPTER (GRAM) INHALATION EVERY 4 HOURS
Refills: 0 | Status: DISCONTINUED | OUTPATIENT
Start: 2020-04-15 | End: 2020-04-16

## 2020-04-15 RX ORDER — PANTOPRAZOLE SODIUM 20 MG/1
40 TABLET, DELAYED RELEASE ORAL
Refills: 0 | Status: DISCONTINUED | OUTPATIENT
Start: 2020-04-15 | End: 2020-04-16

## 2020-04-15 RX ORDER — HYDROXYCHLOROQUINE SULFATE 200 MG
TABLET ORAL
Refills: 0 | Status: DISCONTINUED | OUTPATIENT
Start: 2020-04-15 | End: 2020-04-16

## 2020-04-15 RX ORDER — METOPROLOL TARTRATE 50 MG
25 TABLET ORAL
Refills: 0 | Status: DISCONTINUED | OUTPATIENT
Start: 2020-04-15 | End: 2020-04-16

## 2020-04-15 RX ORDER — ALBUTEROL 90 UG/1
2 AEROSOL, METERED ORAL
Refills: 0 | Status: DISCONTINUED | OUTPATIENT
Start: 2020-04-15 | End: 2020-04-16

## 2020-04-15 RX ORDER — MONTELUKAST 4 MG/1
10 TABLET, CHEWABLE ORAL AT BEDTIME
Refills: 0 | Status: DISCONTINUED | OUTPATIENT
Start: 2020-04-15 | End: 2020-04-16

## 2020-04-15 RX ORDER — AMLODIPINE BESYLATE 2.5 MG/1
5 TABLET ORAL DAILY
Refills: 0 | Status: DISCONTINUED | OUTPATIENT
Start: 2020-04-15 | End: 2020-04-16

## 2020-04-15 RX ORDER — ENOXAPARIN SODIUM 100 MG/ML
40 INJECTION SUBCUTANEOUS DAILY
Refills: 0 | Status: DISCONTINUED | OUTPATIENT
Start: 2020-04-15 | End: 2020-04-16

## 2020-04-15 RX ORDER — FUROSEMIDE 40 MG
40 TABLET ORAL DAILY
Refills: 0 | Status: DISCONTINUED | OUTPATIENT
Start: 2020-04-15 | End: 2020-04-16

## 2020-04-15 RX ORDER — HYDROXYCHLOROQUINE SULFATE 200 MG
400 TABLET ORAL EVERY 24 HOURS
Refills: 0 | Status: DISCONTINUED | OUTPATIENT
Start: 2020-04-16 | End: 2020-04-16

## 2020-04-15 RX ADMIN — BUDESONIDE AND FORMOTEROL FUMARATE DIHYDRATE 2 PUFF(S): 160; 4.5 AEROSOL RESPIRATORY (INHALATION) at 21:15

## 2020-04-15 RX ADMIN — Medication 40 MILLIGRAM(S): at 17:46

## 2020-04-15 RX ADMIN — Medication 1 TABLET(S): at 17:46

## 2020-04-15 RX ADMIN — Medication 25 MILLIGRAM(S): at 17:47

## 2020-04-15 RX ADMIN — MONTELUKAST 10 MILLIGRAM(S): 4 TABLET, CHEWABLE ORAL at 21:15

## 2020-04-15 RX ADMIN — ATORVASTATIN CALCIUM 20 MILLIGRAM(S): 80 TABLET, FILM COATED ORAL at 21:15

## 2020-04-15 RX ADMIN — Medication 125 MILLIGRAM(S): at 06:59

## 2020-04-15 RX ADMIN — Medication 1 PUFF(S): at 06:59

## 2020-04-15 RX ADMIN — ALBUTEROL 2 PUFF(S): 90 AEROSOL, METERED ORAL at 06:59

## 2020-04-15 NOTE — ED PROVIDER NOTE - CARE PLAN
Principal Discharge DX:	Hypoxia  Secondary Diagnosis:	Lung infiltrate  Secondary Diagnosis:	COPD (chronic obstructive pulmonary disease)

## 2020-04-15 NOTE — CHART NOTE - NSCHARTNOTEFT_GEN_A_CORE
reviewed labs - d dimer with dr galdamez  pt placed on lovenox dvt prophylaxis and ct angio ro pe ordered reviewed labs - d dimer with dr galdamez  pt placed on lovenox dvt prophylaxis and ct angio ro pe ordered  will hold ordering hcq until covid results return- as per dr galdamez

## 2020-04-15 NOTE — ED PROVIDER NOTE - NS ED ROS FT
Constitutional: No fever, chills, unintended weight loss.  Eyes:  No visual changes, eye pain or discharge.  ENMT:  No hearing changes, pain, no sore throat or runny nose, no difficulty swallowing  Cardiac:  No chest pain, +sob no edema. No chest pain with exertion.  Respiratory:  see hpi  GI:  No nausea, vomiting, diarrhea or abdominal pain.  :  No dysuria, frequency or burning.  MS:  No myalgia, muscle weakness, joint pain or back pain.  Neuro:  No headache or weakness.  No LOC.  Skin:  No skin rash.   Endocrine: +hypothyroidism no diabetes.

## 2020-04-15 NOTE — H&P ADULT - ASSESSMENT
87 y/o F with pmh COPD (not on home O2), HFpEF (severe MR) presented with Shortness of Breath waking her in the middle of the night. Patient is being ruled out for COVID -19.     #Dyspnea: COPD exacerbation Vs COVID-19 r/o   - f/u COVID 19 PCR  - COVID- 19 PCR 4/10 negative   - D Dimer: 847  - f/u: PCR, Ferritin, procalcitonin  - CXR: unchanged from 4/10 (f/u official read)   - repeat CXR in AM   - troponins negative x 1, f/u repeat   - Starting hydroxychloroquine   - f/u EKG   - f/u pulmonary consult   - wheezing on PE   - Solumedrol 40 BID  - Duonebs  - c/w symbicort, albuterol, montelukast   - not on home oxygen     #HFpEF  - ECHO 1/19: EF: 67%, severe MR   - c/w home medications   -BNP: 572    #Hypertension   - controlled   - c/w amlodipine     #Hypothyroidism  - c/w home medications   - stable  - outpatient f/u     #GERD  - c/w with home meds  - stable   - outpatient f/u     #Diet: DASH  #DVT: Lovenox   #GI PPX: protonix   #Activity: ambulate as tolerated  Dispo: acute   Pending: COVID 19 PCR, pulmonary consult 85 y/o F with pmh COPD (not on home O2), HFpEF (severe MR) presented with Shortness of Breath waking her in the middle of the night. Patient is being ruled out for COVID -19.     #Dyspnea: COPD exacerbation Vs COVID-19 r/o   - f/u COVID 19 PCR  - COVID- 19 PCR 4/10 negative but suspicion is high  - D Dimer: 847  - f/u: PCR, Ferritin, procalcitonin  - CXR: unchanged from 4/10 (f/u official read)   - repeat CXR in AM   - troponins negative x 1, f/u repeat   - Start hydroxychloroquine   - f/u EKG   - f/u pulmonary consult   - Solumedrol 40 BID  - Duonebs  - c/w symbicort, albuterol, montelukast   - not on home oxygen     #HFpEF  - ECHO 1/19: EF: 67%, severe MR   - c/w home medications   -BNP: 572    #Hypertension   - controlled   - c/w amlodipine     #Hypothyroidism  - c/w home medications   - stable  - outpatient f/u     #GERD  - c/w with home meds  - stable   - outpatient f/u     #Diet: DASH  #DVT: Lovenox   #GI PPX: protonix   #Activity: ambulate as tolerated  Dispo: acute   Pending: COVID 19 PCR, pulmonary consult 87 y/o F with pmh COPD (not on home O2), HFpEF (severe MR) presented with Shortness of Breath waking her in the middle of the night. Patient is being ruled out for COVID -19.     #Dyspnea: COPD exacerbation Vs COVID-19 r/o   - f/u COVID 19 PCR  - COVID- 19 PCR 4/10 negative but suspicion is high  - D Dimer: 847  - f/u: PCR, Ferritin, procalcitonin  - CXR: unchanged from 4/10 (f/u official read)   - repeat CXR in AM   - troponins negative x 1, f/u repeat   - Start hydroxychloroquine   - f/u EKG   - f/u pulmonary consult   - Solumedrol 40 BID  - Duonebs  - c/w symbicort, albuterol, montelukast   - CT angiography to rule out PE    #HFpEF  - ECHO 1/19: EF: 67%, severe MR   - c/w home medications   -BNP: 572    #Hypertension   - controlled   - c/w amlodipine     #Hypothyroidism  - c/w home medications   - stable  - outpatient f/u     #GERD  - c/w with home meds  - stable   - outpatient f/u     #Diet: DASH  #DVT: Lovenox   #GI PPX: protonix   #Activity: ambulate as tolerated  Dispo: acute   Pending: COVID 19 PCR, pulmonary consult 87 y/o F with pmh COPD (not on home O2), HFpEF (severe MR) presented with Shortness of Breath waking her in the middle of the night. Patient is being ruled out for COVID -19.     #Dyspnea: COPD exacerbation Vs COVID-19 r/o   - f/u COVID 19 PCR  - COVID- 19 PCR 4/10 negative but suspicion is high  - D Dimer: 847  - f/u: PCR, Ferritin, procalcitonin  - CXR: unchanged from 4/10 (f/u official read)   - repeat CXR in AM   - troponins negative x 1, f/u repeat   - Start hydroxychloroquine if COVID +  - f/u EKG   - f/u pulmonary consult   - Solumedrol 40 BID  - Duonebs  - c/w symbicort, albuterol, montelukast   - CT angiography to rule out PE    #HFpEF  - ECHO 1/19: EF: 67%, severe MR   - c/w home medications   -BNP: 572    #Hypertension   - controlled   - c/w amlodipine     #Hypothyroidism  - c/w home medications   - stable  - outpatient f/u     #GERD  - c/w with home meds  - stable   - outpatient f/u     #Diet: DASH  #DVT: Lovenox   #GI PPX: protonix   #Activity: ambulate as tolerated  Dispo: acute   Pending: COVID 19 PCR, pulmonary consult

## 2020-04-15 NOTE — ED PROVIDER NOTE - OBJECTIVE STATEMENT
80F h/o copd not on home O2, chf on lasix, htn, hypothyroidism, gerd p/w sob/pnd. Woke up overnight acutely sob. No f/c, cp, nv, abd pain, edema. Recent hosp admission for same, tx for copd, covid neg 4/10 but cxr w/bl opacities, no abx per ID c/s, discharged to home 4/11 on prednisone 40mg daily which she has been taking. PMD Dabaghian.

## 2020-04-15 NOTE — H&P ADULT - NSHPPHYSICALEXAM_GEN_ALL_CORE
CONSTITUTIONAL: elderly f nad  SKIN: Skin exam is warm and dry, no acute rash.  HEAD: Normocephalic; atraumatic.  EYES: PERRL, EOM intact; conjunctiva and sclera clear.  ENT: No nasal discharge; airway clear.  NECK: Supple; non tender. no jvd.  CARD: S1, S2 normal; no murmurs, gallops, or rubs. Regular rate and rhythm.  RESP: nl wob, mild decr air entry bl, +exp wheezes bl, no rales/rhonchi  ABD: Normal bowel sounds; soft; non-distended; non-tender; no hepatosplenomegaly; no rgr.  BACK: non-tender, no CVAT  EXT: Normal ROM. No clubbing, cyanosis or edema. DPI.  NEURO: Alert, oriented. Grossly unremarkable. No focal deficits.  PSYCH: Cooperative, appropriate. CONSTITUTIONAL: elderly f nad  SKIN: Skin exam is warm and dry, no acute rash.  HEAD: Normocephalic; atraumatic.  EYES: PERRL, EOM intact; conjunctiva and sclera clear.  ENT: No nasal discharge; airway clear.  NECK: Supple; non tender. no jvd.  CARD: S1, S2 normal; no murmurs, gallops, or rubs. Regular rate and rhythm.  RESP: nl wob, mild decr air entry bl, +exp wheezes bl, no rales/rhonchi  ABD: Normal bowel sounds; soft; non-distended; non-tender; no hepatosplenomegaly; no rgr.  BACK: non-tender, no CVAT  EXT: Normal ROM. No clubbing, cyanosis or edema. DPI.  NEURO: Alert, oriented. Grossly unremarkable. No focal deficits.  PSYCH: Cooperative, appropriate

## 2020-04-15 NOTE — H&P ADULT - HISTORY OF PRESENT ILLNESS
80 year old female with h/o COPD (not on home O2), HFpEF (EF 67%, severe MR), HTN, Hypothyroidism, GERD presented after waking up feeling short of breath. She woke up yesterday around 5am with a feeling of anxiety/ nervousness. Endorsed Shortness of breath during the time and a sensation as if she is gasping for air, associated with chest tightness in the epigastric area. Denied any chest pain, radiation of chest tightness, diaphoresis, or association of pain with movement. Following this episode she would use her rescue inhaler and lying down. Shortly after her symptoms would improve and she would feel as if nothing ever happened. Endorses having these episodes since February Endorses a dry cough. Denies fever, myalgia, fatigue, headache, changes in bowel habits, or abdominal pain.   Of note she was recently discharged from [facility] for similar symptoms. She was ruled out for COVID -19 with negative PCR. Discharged with prednisone taper for COPD, which she is unsure if she has been taking her prednisone.   In ED: Vitals T: 96.9 F, HR 65, RR:20 , BP: 145/70. Being admitted for COVID-19 r/o.

## 2020-04-15 NOTE — PROGRESS NOTE ADULT - SUBJECTIVE AND OBJECTIVE BOX
80 yr old female c/o increasing sob.  was admitted last week and discharged over the weekend-- was covid negative  pt re swabbed for covid- pending at this time    pmhx: chf, copd, sob

## 2020-04-15 NOTE — PROGRESS NOTE ADULT - ASSESSMENT
Vital Signs Last 24 Hrs  T(C): 36.5 (15 Apr 2020 08:30), Max: 36.5 (15 Apr 2020 08:30)  T(F): 97.7 (15 Apr 2020 08:30), Max: 97.7 (15 Apr 2020 08:30)  HR: 77 (15 Apr 2020 08:30) (54 - 77)  BP: 164/87 (15 Apr 2020 08:30) (145/70 - 164/87)  BP(mean): --  RR: 19 (15 Apr 2020 08:30) (19 - 24)  SpO2: 97% (15 Apr 2020 08:30) (88% - 98%)    CBC Full  -  ( 15 Apr 2020 06:45 )  WBC Count : 5.73 K/uL  RBC Count : 3.55 M/uL  Hemoglobin : 11.1 g/dL  Hematocrit : 34.6 %  Platelet Count - Automated : 211 K/uL  Mean Cell Volume : 97.5 fL  Mean Cell Hemoglobin : 31.3 pg  Mean Cell Hemoglobin Concentration : 32.1 g/dL  Auto Neutrophil # : 4.24 K/uL  Auto Lymphocyte # : 0.72 K/uL  Auto Monocyte # : 0.55 K/uL  Auto Eosinophil # : 0.15 K/uL  Auto Basophil # : 0.05 K/uL  Auto Neutrophil % : 74.0 %  Auto Lymphocyte % : 12.6 %  Auto Monocyte % : 9.6 %  Auto Eosinophil % : 2.6 %  Auto Basophil % : 0.9 %    04-15    140  |  103  |  14  ----------------------------<  104<H>  4.9   |  27  |  0.8    Ca    9.3      15 Apr 2020 06:45    TPro  7.8  /  Alb  4.1  /  TBili  0.3  /  DBili  x   /  AST  29  /  ALT  18  /  AlkPhos  67  04-15      PT/INR - ( 15 Apr 2020 06:45 )   PT: 11.90 sec;   INR: 1.03 ratio    PTT - ( 15 Apr 2020 06:45 )  PTT:31.4 sec    MEDICATIONS  (STANDING):  ALBUTerol    90 MICROgram(s) HFA Inhaler 2 Puff(s) Inhalation every 15 minutes  amLODIPine   Tablet 5 milliGRAM(s) Oral daily  atorvastatin 20 milliGRAM(s) Oral at bedtime  enalapril 10 milliGRAM(s) Oral daily  furosemide    Tablet 40 milliGRAM(s) Oral daily  levothyroxine 25 MICROGram(s) Oral daily  metoprolol tartrate 25 milliGRAM(s) Oral two times a day  montelukast Oral Tab/Cap - Peds 10 milliGRAM(s) Oral at bedtime  multivitamin 1 Tablet(s) Oral daily  pantoprazole    Tablet 40 milliGRAM(s) Oral before breakfast    MEDICATIONS  (PRN):    plan  ? covid- resuts pending  monitor, wean down o2 Vital Signs Last 24 Hrs  T(C): 36.5 (15 Apr 2020 08:30), Max: 36.5 (15 Apr 2020 08:30)  T(F): 97.7 (15 Apr 2020 08:30), Max: 97.7 (15 Apr 2020 08:30)  HR: 77 (15 Apr 2020 08:30) (54 - 77)  BP: 164/87 (15 Apr 2020 08:30) (145/70 - 164/87)  BP(mean): --  RR: 19 (15 Apr 2020 08:30) (19 - 24)  SpO2: 97% (15 Apr 2020 08:30) (88% - 98%)    CBC Full  -  ( 15 Apr 2020 06:45 )  WBC Count : 5.73 K/uL  RBC Count : 3.55 M/uL  Hemoglobin : 11.1 g/dL  Hematocrit : 34.6 %  Platelet Count - Automated : 211 K/uL  Mean Cell Volume : 97.5 fL  Mean Cell Hemoglobin : 31.3 pg  Mean Cell Hemoglobin Concentration : 32.1 g/dL  Auto Neutrophil # : 4.24 K/uL  Auto Lymphocyte # : 0.72 K/uL  Auto Monocyte # : 0.55 K/uL  Auto Eosinophil # : 0.15 K/uL  Auto Basophil # : 0.05 K/uL  Auto Neutrophil % : 74.0 %  Auto Lymphocyte % : 12.6 %  Auto Monocyte % : 9.6 %  Auto Eosinophil % : 2.6 %  Auto Basophil % : 0.9 %    04-15    140  |  103  |  14  ----------------------------<  104<H>  4.9   |  27  |  0.8    Ca    9.3      15 Apr 2020 06:45    TPro  7.8  /  Alb  4.1  /  TBili  0.3  /  DBili  x   /  AST  29  /  ALT  18  /  AlkPhos  67  04-15      PT/INR - ( 15 Apr 2020 06:45 )   PT: 11.90 sec;   INR: 1.03 ratio    PTT - ( 15 Apr 2020 06:45 )  PTT:31.4 sec    MEDICATIONS  (STANDING):  ALBUTerol    90 MICROgram(s) HFA Inhaler 2 Puff(s) Inhalation every 15 minutes  amLODIPine   Tablet 5 milliGRAM(s) Oral daily  atorvastatin 20 milliGRAM(s) Oral at bedtime  enalapril 10 milliGRAM(s) Oral daily  furosemide    Tablet 40 milliGRAM(s) Oral daily  levothyroxine 25 MICROGram(s) Oral daily  metoprolol tartrate 25 milliGRAM(s) Oral two times a day  montelukast Oral Tab/Cap - Peds 10 milliGRAM(s) Oral at bedtime  multivitamin 1 Tablet(s) Oral daily  pantoprazole    Tablet 40 milliGRAM(s) Oral before breakfast    MEDICATIONS  (PRN):      Please see H+P for plan

## 2020-04-15 NOTE — H&P ADULT - NSHPLABSRESULTS_GEN_ALL_CORE
VITALS:   T(F): 96.9  HR: 65  BP: 145/70  RR: 20  SpO2: 96%    LABS:                        11.1   5.73  )-----------( 211      ( 15 Apr 2020 06:45 )             34.6     04-15    140  |  103  |  14  ----------------------------<  104<H>  4.9   |  27  |  0.8    Ca    9.3      15 Apr 2020 06:45    TPro  7.8  /  Alb  4.1  /  TBili  0.3  /  DBili  x   /  AST  29  /  ALT  18  /  AlkPhos  67  04-15    PT/INR - ( 15 Apr 2020 06:45 )   PT: 11.90 sec;   INR: 1.03 ratio         PTT - ( 15 Apr 2020 06:45 )  PTT:31.4 sec      Troponin T, Serum: <0.01 ng/mL (04-15-20 @ 06:45)      CARDIAC MARKERS ( 15 Apr 2020 06:45 )  x     / <0.01 ng/mL / x     / x     / x

## 2020-04-15 NOTE — ED PROVIDER NOTE - CLINICAL SUMMARY MEDICAL DECISION MAKING FREE TEXT BOX
sob/wheezing, h/o copd/chf recent hosp admission for copd covid neg on 4/10 - intermittently hypoxic on RA, improved on nc, MDI/steroids, retested for covid given recent exposure during hosp admission - admitted for continued suppl O2 and further mgmt sob/wheezing, h/o copd/chf recent hosp admission for copd covid neg on 4/10 - intermittently hypoxic on RA, improved on nc, MDI/steroids, cxr highly suspicious for covid/worsening opacities, retested for covid given recent exposure during hosp admission - admitted for continued suppl O2 and further mgmt

## 2020-04-15 NOTE — ED PROVIDER NOTE - PHYSICAL EXAMINATION
VITAL SIGNS: I have reviewed nursing notes and confirm.  CONSTITUTIONAL: elderly f nad  SKIN: Skin exam is warm and dry, no acute rash.  HEAD: Normocephalic; atraumatic.  EYES: PERRL, EOM intact; conjunctiva and sclera clear.  ENT: No nasal discharge; airway clear.  NECK: Supple; non tender. no jvd.  CARD: S1, S2 normal; no murmurs, gallops, or rubs. Regular rate and rhythm.  RESP: nl wob, mild decr air entry bl, +exp wheezes bl, no rales/rhonchi  ABD: Normal bowel sounds; soft; non-distended; non-tender; no hepatosplenomegaly; no rgr.  BACK: non-tender, no CVAT  EXT: Normal ROM. No clubbing, cyanosis or edema. DPI.  NEURO: Alert, oriented. Grossly unremarkable. No focal deficits.  PSYCH: Cooperative, appropriate.

## 2020-04-16 ENCOUNTER — TRANSCRIPTION ENCOUNTER (OUTPATIENT)
Age: 80
End: 2020-04-16

## 2020-04-16 VITALS
RESPIRATION RATE: 18 BRPM | HEART RATE: 76 BPM | TEMPERATURE: 97 F | SYSTOLIC BLOOD PRESSURE: 168 MMHG | DIASTOLIC BLOOD PRESSURE: 81 MMHG | OXYGEN SATURATION: 95 %

## 2020-04-16 PROCEDURE — 71045 X-RAY EXAM CHEST 1 VIEW: CPT | Mod: 26

## 2020-04-16 PROCEDURE — 99232 SBSQ HOSP IP/OBS MODERATE 35: CPT

## 2020-04-16 RX ORDER — ALPRAZOLAM 0.25 MG
1 TABLET ORAL
Qty: 20 | Refills: 0
Start: 2020-04-16

## 2020-04-16 RX ADMIN — ENOXAPARIN SODIUM 40 MILLIGRAM(S): 100 INJECTION SUBCUTANEOUS at 11:40

## 2020-04-16 RX ADMIN — Medication 1 TABLET(S): at 11:40

## 2020-04-16 RX ADMIN — BUDESONIDE AND FORMOTEROL FUMARATE DIHYDRATE 2 PUFF(S): 160; 4.5 AEROSOL RESPIRATORY (INHALATION) at 11:11

## 2020-04-16 RX ADMIN — AMLODIPINE BESYLATE 5 MILLIGRAM(S): 2.5 TABLET ORAL at 05:09

## 2020-04-16 RX ADMIN — ENOXAPARIN SODIUM 40 MILLIGRAM(S): 100 INJECTION SUBCUTANEOUS at 05:12

## 2020-04-16 RX ADMIN — Medication 40 MILLIGRAM(S): at 05:08

## 2020-04-16 RX ADMIN — Medication 10 MILLIGRAM(S): at 05:09

## 2020-04-16 RX ADMIN — PANTOPRAZOLE SODIUM 40 MILLIGRAM(S): 20 TABLET, DELAYED RELEASE ORAL at 05:13

## 2020-04-16 RX ADMIN — Medication 25 MILLIGRAM(S): at 05:10

## 2020-04-16 RX ADMIN — Medication 40 MILLIGRAM(S): at 05:10

## 2020-04-16 RX ADMIN — Medication 25 MICROGRAM(S): at 05:11

## 2020-04-16 NOTE — DISCHARGE NOTE PROVIDER - NSFOLLOWUPCLINICS_GEN_ALL_ED_FT
[facility]  Medicine  [address], [place]   Phone: [phone]  Fax:   Follow Up Time: 1 month

## 2020-04-16 NOTE — DISCHARGE NOTE PROVIDER - NSDCMRMEDTOKEN_GEN_ALL_CORE_FT
albuterol 2.5 mg/3 mL (0.083%) inhalation solution: 3 milliliter(s) inhaled every 2 hours, As Needed  amLODIPine 5 mg oral tablet: 1 tab(s) orally once a day  atorvastatin 20 mg oral tablet: 1 tab(s) orally once a day  Breo Ellipta 100 mcg-25 mcg/inh inhalation powder: 1 puff(s) inhaled once a day  Deltasone 20 mg oral tablet: 2 tab(s) orally once a day   enalapril 10 mg oral tablet: 1 tab(s) orally once a day  furosemide 40 mg oral tablet: 1 tab(s) orally once a day  levothyroxine 25 mcg (0.025 mg) oral tablet: 1 tab(s) orally once a day  metoprolol tartrate 25 mg oral tablet: 1 tab(s) orally 2 times a day  MONTELUKAST  TAB 10MG:   Multiple Vitamins oral tablet: 1 tab(s) orally once a day  PANTOPRAZOLE TAB 40MG:   ProAir HFA 90 mcg/inh inhalation aerosol: 1 puff(s) inhaled every 4 hours, As needed, Bronchospasm albuterol 2.5 mg/3 mL (0.083%) inhalation solution: 3 milliliter(s) inhaled every 2 hours, As Needed  amLODIPine 5 mg oral tablet: 1 tab(s) orally once a day  atorvastatin 20 mg oral tablet: 1 tab(s) orally once a day  Breo Ellipta 100 mcg-25 mcg/inh inhalation powder: 1 puff(s) inhaled once a day  enalapril 10 mg oral tablet: 1 tab(s) orally once a day  furosemide 40 mg oral tablet: 1 tab(s) orally once a day  levothyroxine 25 mcg (0.025 mg) oral tablet: 1 tab(s) orally once a day  metoprolol tartrate 25 mg oral tablet: 1 tab(s) orally 2 times a day  MONTELUKAST  TAB 10MG:   Multiple Vitamins oral tablet: 1 tab(s) orally once a day  PANTOPRAZOLE TAB 40MG:   ProAir HFA 90 mcg/inh inhalation aerosol: 1 puff(s) inhaled every 4 hours, As needed, Bronchospasm  Xanax 0.25 mg oral tablet: 1 tab(s) orally 2 times a day, As Needed -for anxiety  for PANIC ATTACKS only MDD:2

## 2020-04-16 NOTE — PROGRESS NOTE ADULT - SUBJECTIVE AND OBJECTIVE BOX
I made rounds today with the treatment team including the hospitalist, residents,  nurses and  and discussed the patient's current medical status and discharge  planning needs. in addition I reviewed  the chart as well as laboratoory  data.    T(C): 36 (04-16-20 @ 07:30), Max: 36.1 (04-16-20 @ 00:00)  HR: 76 (04-16-20 @ 07:30) (65 - 80)  BP: 168/81 (04-16-20 @ 07:30) (139/68 - 168/81)  RR: 18 (04-16-20 @ 07:30) (18 - 18)  SpO2: 95% (04-16-20 @ 07:30) (95% - 99%)           I reached out to the patient's health care proxy/ responsible family member-           [     ]  I reached                                     and discussed the patient's medical condition,                   family concerns, and discharge planning           [  x   ]  I left a message with family     The following was discussed: pt is doing better and is covid neg__- therefor e  will set up transport          [     ] I spent 5-10 minutes on the above discussing medical issues with team members and family    [  x   ] I spent 11-20 minutes on the above discussing medical issues with team members and family    [     ] I spent 21-30 minutes on the above discussing medical issues with team members and family

## 2020-04-16 NOTE — DISCHARGE NOTE NURSING/CASE MANAGEMENT/SOCIAL WORK - PATIENT PORTAL LINK FT
You can access the FollowMyHealth Patient Portal offered by [facility] by registering at the following website: [email or web address]. By joining [employer]’s FollowMyHealth portal, you will also be able to view your health information using other applications (apps) compatible with our system.

## 2020-04-16 NOTE — DISCHARGE NOTE PROVIDER - NSDCCPCAREPLAN_GEN_ALL_CORE_FT
PRINCIPAL DISCHARGE DIAGNOSIS  Diagnosis: Acute anxiety  Assessment and Plan of Treatment: Your anxiety level is high. We are providing with a temporary medical treatment. Please take ONLY if panic attack. You need however to follow with your primary care doctor      SECONDARY DISCHARGE DIAGNOSES  Diagnosis: COPD (chronic obstructive pulmonary disease)  Assessment and Plan of Treatment: Continue your current treatment. You are stable currently and didn't show sign of flare.    Diagnosis: Lung infiltrate  Assessment and Plan of Treatment: You were tested twice for COVID. On both occasion the test showed that you don't have the disease. You need to repeat your chest imaging with your primary care provider in 4-8 weeks. No indication for home isolation fo rnow since you re not shwing signs of COVID (no fever, negative tests x2, no new shortness of breathing or cough)

## 2020-04-16 NOTE — PROGRESS NOTE ADULT - SUBJECTIVE AND OBJECTIVE BOX
T H I S   I S    N O  T   A    F I N A L I Z E D   N O T GUS HANNON, SCOTTY  80y, Female  Allergy: No Known Allergies    Hospital Day: 1d    Patient seen and examined earlier today.     PMH/PSH:  Hypothyroidism  Severe mitral regurgitation  COPD (chronic obstructive pulmonary disease)  CHF (congestive heart failure)  HTN (hypertension)    LAST 24-Hr EVENTS:    VITALS:  T(F): 96.8 (04-16-20 @ 07:30), Max: 97 (04-16-20 @ 00:00)  HR: 76 (04-16-20 @ 07:30)  BP: 168/81 (04-16-20 @ 07:30) (139/68 - 168/81)  RR: 18 (04-16-20 @ 07:30)  SpO2: 95% (04-16-20 @ 07:30) on         TESTS & MEASUREMENTS:  Weight (Kg): 77.1 (04-15-20 @ 04:50)  BMI (kg/m2): 31.1 (04-15)                          11.1   5.73  )-----------( 211      ( 15 Apr 2020 06:45 )             34.6     PT/INR - ( 15 Apr 2020 06:45 )   PT: 11.90 sec;   INR: 1.03 ratio         PTT - ( 15 Apr 2020 06:45 )  PTT:31.4 sec  04-15    140  |  103  |  14  ----------------------------<  104<H>  4.9   |  27  |  0.8    Ca    9.3      15 Apr 2020 06:45    TPro  7.8  /  Alb  4.1  /  TBili  0.3  /  DBili  x   /  AST  29  /  ALT  18  /  AlkPhos  67  04-15    LIVER FUNCTIONS - ( 15 Apr 2020 06:45 )  Alb: 4.1 g/dL / Pro: 7.8 g/dL / ALK PHOS: 67 U/L / ALT: 18 U/L / AST: 29 U/L / GGT: x           CARDIAC MARKERS ( 15 Apr 2020 17:40 )  x     / <0.01 ng/mL / x     / x     / x      CARDIAC MARKERS ( 15 Apr 2020 06:45 )  x     / <0.01 ng/mL / x     / x     / x          Procalcitonin, Serum: <0.02 ng/mL (04-15-20 @ 06:45)  Procalcitonin, Serum: 0.03 ng/mL (04-10-20 @ 04:30)    D-Dimer Assay, Quantitative: 847 ng/mL DDU (04-15-20 @ 06:45)  D-Dimer Assay, Quantitative: 444 ng/mL DDU (04-10-20 @ 04:30)    Ferritin, Serum: 148 ng/mL (04-15-20 @ 06:45)  Ferritin, Serum: 124 ng/mL (04-10-20 @ 04:30)    Serum Pro-Brain Natriuretic Peptide: 572 pg/mL (04-15-20 @ 06:45)  Serum Pro-Brain Natriuretic Peptide: 836 pg/mL (04-10-20 @ 04:30)    COVID-19 PCR: NotDetec (04-15-20 @ 05:39)  COVID-19 PCR: NotDetec (04-10-20 @ 04:00)      RADIOLOGY, ECG, & ADDITIONAL TESTS:  12 Lead ECG:   Ventricular Rate 69 BPM    Atrial Rate 69 BPM    P-R Interval 160 ms    QRS Duration 104 ms    Q-T Interval 448 ms    QTC Calculation(Bezet) 480 ms    P Axis 56 degrees    R Axis -11 degrees    T Axis 54 degrees    Diagnosis Line Normal sinus rhythm  Possible Left atrial enlargement  Left ventricular hypertrophy  Abnormal ECG    Confirmed by Christopher Gill ([id number]) on 4/15/2020 12:26:23 PM (04-15-20 @ 11:38)      RAD  CT ANGIO (4/15)    Diffuse bilateral patchy ground glass opacities. This pattern is compatible with atypical viral infection from COVID-19.    Stable enlarged pretracheal lymph node, nonspecific.      MEDICATIONS:  MEDICATIONS  (STANDING):  ALBUTerol    90 MICROgram(s) HFA Inhaler 2 Puff(s) Inhalation every 15 minutes  albuterol/ipratropium for Nebulization 3 milliLiter(s) Nebulizer every 4 hours  amLODIPine   Tablet 5 milliGRAM(s) Oral daily  atorvastatin 20 milliGRAM(s) Oral at bedtime  budesonide  80 MICROgram(s)/formoterol 4.5 MICROgram(s) Inhaler 2 Puff(s) Inhalation two times a day  chlorhexidine 4% Liquid 1 Application(s) Topical <User Schedule>  enalapril 10 milliGRAM(s) Oral daily  enoxaparin Injectable 40 milliGRAM(s) SubCutaneous daily  furosemide    Tablet 40 milliGRAM(s) Oral daily  levothyroxine 25 MICROGram(s) Oral daily  methylPREDNISolone sodium succinate Injectable 40 milliGRAM(s) IV Push two times a day  metoprolol tartrate 25 milliGRAM(s) Oral two times a day  montelukast Oral Tab/Cap - Peds 10 milliGRAM(s) Oral at bedtime  multivitamin 1 Tablet(s) Oral daily  pantoprazole    Tablet 40 milliGRAM(s) Oral before breakfast    MEDICATIONS  (PRN):  albuterol/ipratropium for Nebulization 3 milliLiter(s) Nebulizer every 4 hours PRN Shortness of Breath and/or Wheezing      HOME MEDICATIONS:  albuterol 2.5 mg/3 mL (0.083%) inhalation solution (04-10)  amLODIPine 5 mg oral tablet (04-10)  atorvastatin 20 mg oral tablet (04-10)  Breo Ellipta 100 mcg-25 mcg/inh inhalation powder (04-10)  enalapril 10 mg oral tablet (04-10)  furosemide 40 mg oral tablet (04-10)  levothyroxine 25 mcg (0.025 mg) oral tablet (04-10)  metoprolol tartrate 25 mg oral tablet (04-10)  MONTELUKAST  TAB 10MG (04-10)  PANTOPRAZOLE TAB 40MG (04-10)      PHYSICAL EXAM:  GENERAL:   NECK: No Swelling  CHEST/LUNG: Good air entry, No wheezing  HEART: RRR, No murmurs  ABDOMEN: Soft, Bowel sounds present  EXTREMITIES:  No clubbing, SCOTTY HANNON  80y, Female  Allergy: No Known Allergies    Hospital Day: 1d    Patient seen and examined earlier today.     PMH/PSH:  Hypothyroidism  Severe mitral regurgitation  COPD (chronic obstructive pulmonary disease)  CHF (congestive heart failure)  HTN (hypertension)    LAST 24-Hr EVENTS:  NO complaints.    VITALS:  T(F): 96.8 (04-16-20 @ 07:30), Max: 97 (04-16-20 @ 00:00)  HR: 76 (04-16-20 @ 07:30)  BP: 168/81 (04-16-20 @ 07:30) (139/68 - 168/81)  RR: 18 (04-16-20 @ 07:30)  SpO2: 95% (04-16-20) on RA after ambulation      TESTS & MEASUREMENTS:  Weight (Kg): 77.1 (04-15-20 @ 04:50)  BMI (kg/m2): 31.1 (04-15)                          11.1   5.73  )-----------( 211      ( 15 Apr 2020 06:45 )             34.6     PT/INR - ( 15 Apr 2020 06:45 )   PT: 11.90 sec;   INR: 1.03 ratio         PTT - ( 15 Apr 2020 06:45 )  PTT:31.4 sec  04-15    140  |  103  |  14  ----------------------------<  104<H>  4.9   |  27  |  0.8    Ca    9.3      15 Apr 2020 06:45    TPro  7.8  /  Alb  4.1  /  TBili  0.3  /  DBili  x   /  AST  29  /  ALT  18  /  AlkPhos  67  04-15    LIVER FUNCTIONS - ( 15 Apr 2020 06:45 )  Alb: 4.1 g/dL / Pro: 7.8 g/dL / ALK PHOS: 67 U/L / ALT: 18 U/L / AST: 29 U/L / GGT: x           CARDIAC MARKERS ( 15 Apr 2020 17:40 )  x     / <0.01 ng/mL / x     / x     / x      CARDIAC MARKERS ( 15 Apr 2020 06:45 )  x     / <0.01 ng/mL / x     / x     / x          Procalcitonin, Serum: <0.02 ng/mL (04-15-20 @ 06:45)  Procalcitonin, Serum: 0.03 ng/mL (04-10-20 @ 04:30)    D-Dimer Assay, Quantitative: 847 ng/mL DDU (04-15-20 @ 06:45)  D-Dimer Assay, Quantitative: 444 ng/mL DDU (04-10-20 @ 04:30)    Ferritin, Serum: 148 ng/mL (04-15-20 @ 06:45)  Ferritin, Serum: 124 ng/mL (04-10-20 @ 04:30)    Serum Pro-Brain Natriuretic Peptide: 572 pg/mL (04-15-20 @ 06:45)  Serum Pro-Brain Natriuretic Peptide: 836 pg/mL (04-10-20 @ 04:30)    COVID-19 PCR: NotDetec (04-15-20 @ 05:39)  COVID-19 PCR: NotDetec (04-10-20 @ 04:00)      RADIOLOGY, ECG, & ADDITIONAL TESTS:  12 Lead ECG:   Ventricular Rate 69 BPM    Atrial Rate 69 BPM    P-R Interval 160 ms    QRS Duration 104 ms    Q-T Interval 448 ms    QTC Calculation(Bezet) 480 ms    P Axis 56 degrees    R Axis -11 degrees    T Axis 54 degrees    Diagnosis Line Normal sinus rhythm  Possible Left atrial enlargement  Left ventricular hypertrophy  Abnormal ECG    Confirmed by Christopher Gill ([id number]) on 4/15/2020 12:26:23 PM (04-15-20 @ 11:38)      RAD  CT ANGIO (4/15)    Diffuse bilateral patchy ground glass opacities. This pattern is compatible with atypical viral infection from COVID-19.    Stable enlarged pretracheal lymph node, nonspecific.      MEDICATIONS:  MEDICATIONS  (STANDING):  ALBUTerol    90 MICROgram(s) HFA Inhaler 2 Puff(s) Inhalation every 15 minutes  albuterol/ipratropium for Nebulization 3 milliLiter(s) Nebulizer every 4 hours  amLODIPine   Tablet 5 milliGRAM(s) Oral daily  atorvastatin 20 milliGRAM(s) Oral at bedtime  budesonide  80 MICROgram(s)/formoterol 4.5 MICROgram(s) Inhaler 2 Puff(s) Inhalation two times a day  chlorhexidine 4% Liquid 1 Application(s) Topical <User Schedule>  enalapril 10 milliGRAM(s) Oral daily  enoxaparin Injectable 40 milliGRAM(s) SubCutaneous daily  furosemide    Tablet 40 milliGRAM(s) Oral daily  levothyroxine 25 MICROGram(s) Oral daily  methylPREDNISolone sodium succinate Injectable 40 milliGRAM(s) IV Push two times a day  metoprolol tartrate 25 milliGRAM(s) Oral two times a day  montelukast Oral Tab/Cap - Peds 10 milliGRAM(s) Oral at bedtime  multivitamin 1 Tablet(s) Oral daily  pantoprazole    Tablet 40 milliGRAM(s) Oral before breakfast    MEDICATIONS  (PRN):  albuterol/ipratropium for Nebulization 3 milliLiter(s) Nebulizer every 4 hours PRN Shortness of Breath and/or Wheezing      HOME MEDICATIONS:  albuterol 2.5 mg/3 mL (0.083%) inhalation solution (04-10)  amLODIPine 5 mg oral tablet (04-10)  atorvastatin 20 mg oral tablet (04-10)  Breo Ellipta 100 mcg-25 mcg/inh inhalation powder (04-10)  enalapril 10 mg oral tablet (04-10)  furosemide 40 mg oral tablet (04-10)  levothyroxine 25 mcg (0.025 mg) oral tablet (04-10)  metoprolol tartrate 25 mg oral tablet (04-10)  MONTELUKAST  TAB 10MG (04-10)  PANTOPRAZOLE TAB 40MG (04-10)      PHYSICAL EXAM:  GENERAL: comfortable  NECK: No Swelling  CHEST/LUNG: Good air entry, No wheezing  HEART: RRR, No murmurs  ABDOMEN: Soft, Bowel sounds present  EXTREMITIES:  No clubbing,

## 2020-04-16 NOTE — DISCHARGE NOTE PROVIDER - HOSPITAL COURSE
80 year old female with h/o COPD (not on home O2), HFpEF (EF 67%, severe MR), HTN, Hypothyroidism, GERD presented after waking up feeling short of breath. She woke up yesterday around 5am with a feeling of anxiety/ nervousness. Endorsed Shortness of breath during the time and a sensation as if she is gasping for air, associated with chest tightness in the epigastric area. Denied any chest pain, radiation of chest tightness, diaphoresis, or association of pain with movement. Following this episode she would use her rescue inhaler and lying down. Shortly after her symptoms would improve and she would feel as if nothing ever happened. Endorses having these episodes since February Endorses a dry cough. Denies fever, myalgia, fatigue, headache, changes in bowel habits, or abdominal pain.     Of note she was recently discharged from [facility] for similar symptoms. She was ruled out for COVID -19 with negative PCR. Discharged with prednisone taper for COPD, which she is unsure if she has been taking her prednisone.     She tested negative for COVID -2- 4/10 and 4/15 80 year old female with h/o COPD (not on home O2), HFpEF (EF 67%, severe MR), HTN, Hypothyroidism, GERD presented after waking up feeling short of breath. She woke up yesterday around 5am with a feeling of anxiety/ nervousness. Endorsed Shortness of breath during the time and a sensation as if she is gasping for air, associated with chest tightness in the epigastric area. Denied any chest pain, radiation of chest tightness, diaphoresis, or association of pain with movement. Following this episode she would use her rescue inhaler and lying down. Shortly after her symptoms would improve and she would feel as if nothing ever happened. Endorses having these episodes since February Endorses a dry cough. Denies fever, myalgia, fatigue, headache, changes in bowel habits, or abdominal pain.     Of note she was recently discharged from [facility] for similar symptoms. She was ruled out for COVID -19 with negative PCR. Discharged with prednisone taper for COPD, which she is unsure if she has been taking her prednisone.     She tested negative for COVID -2- 4/10 and 4/15        At this point no need for home isolation

## 2020-04-16 NOTE — PROGRESS NOTE ADULT - ASSESSMENT
·	Likely remote COV19 exposure (CT scan findings) with currently negative testing x2   ·	Needs to repeat chest imaging in 6-8 weeks to document resolution; patient is A&Ox3 and fully understands the need to r/o malignancy in case of persistence.   ·	No evidence of pneumonia at this time  ·	No indication for self isolation at home  ·	Panic Attack; uncontrolled anxiety; patient is aware of it and is agreeable to a PRN (severe panic attacks) low dose BZD; she 's made aware of addictive and sedative adverse events.     Please refer to DC NOTE.   Discussed with PA assigned   Family informed   Total time for DC: 35 mins

## 2020-04-19 DIAGNOSIS — J44.9 CHRONIC OBSTRUCTIVE PULMONARY DISEASE, UNSPECIFIED: ICD-10-CM

## 2020-04-19 DIAGNOSIS — F41.0 PANIC DISORDER [EPISODIC PAROXYSMAL ANXIETY]: ICD-10-CM

## 2020-04-19 DIAGNOSIS — R91.8 OTHER NONSPECIFIC ABNORMAL FINDING OF LUNG FIELD: ICD-10-CM

## 2020-04-19 DIAGNOSIS — K21.9 GASTRO-ESOPHAGEAL REFLUX DISEASE WITHOUT ESOPHAGITIS: ICD-10-CM

## 2020-04-19 DIAGNOSIS — I11.0 HYPERTENSIVE HEART DISEASE WITH HEART FAILURE: ICD-10-CM

## 2020-04-19 DIAGNOSIS — I50.32 CHRONIC DIASTOLIC (CONGESTIVE) HEART FAILURE: ICD-10-CM

## 2020-04-19 DIAGNOSIS — R06.02 SHORTNESS OF BREATH: ICD-10-CM

## 2020-04-19 DIAGNOSIS — E03.9 HYPOTHYROIDISM, UNSPECIFIED: ICD-10-CM

## 2020-04-19 DIAGNOSIS — Z87.891 PERSONAL HISTORY OF NICOTINE DEPENDENCE: ICD-10-CM

## 2020-05-07 ENCOUNTER — EMERGENCY (EMERGENCY)
Facility: HOSPITAL | Age: 80
LOS: 0 days | Discharge: HOME | End: 2020-05-07
Attending: STUDENT IN AN ORGANIZED HEALTH CARE EDUCATION/TRAINING PROGRAM | Admitting: STUDENT IN AN ORGANIZED HEALTH CARE EDUCATION/TRAINING PROGRAM
Payer: MEDICARE

## 2020-05-07 DIAGNOSIS — I46.9 CARDIAC ARREST, CAUSE UNSPECIFIED: ICD-10-CM

## 2020-05-07 DIAGNOSIS — I50.9 HEART FAILURE, UNSPECIFIED: ICD-10-CM

## 2020-05-07 DIAGNOSIS — Z98.890 OTHER SPECIFIED POSTPROCEDURAL STATES: Chronic | ICD-10-CM

## 2020-05-07 PROCEDURE — 99285 EMERGENCY DEPT VISIT HI MDM: CPT | Mod: 25

## 2020-05-07 PROCEDURE — 92950 HEART/LUNG RESUSCITATION CPR: CPT

## 2020-05-07 NOTE — ED PROVIDER NOTE - OBJECTIVE STATEMENT
80 y F pmh chf, copd pw cardiac arrest. Per EMS started having agonal breathing and seizure like/shaking activity as they were transporting the patient on scene. Gave 7 epi and placed LMA prior to arrival and did not achieve ROSC. Rhythm was PEA arrest and asystole.

## 2020-05-07 NOTE — ED ADULT TRIAGE NOTE - CHIEF COMPLAINT QUOTE
pt here s/p cardiac arrest meds given by EMS see record, LMA placed by ems, CPR reinitated in ED pea- no pulse

## 2020-05-07 NOTE — ED PROVIDER NOTE - ATTENDING CONTRIBUTION TO CARE
79 yo f hx chf, copd BIBEMS for cardiac arrest. EMS states they believe she has chf/copd but are not sure. they were unable to get medication list and ID given by family was incorrect.  Per ems, pt has agonal breathing, sz like activity and went into arrest. EMS states pt remained in arrest. EMS gave epi x7, placed LMA and brought pt in. Out of hospital time ~30 minutes.     In ED, ACLS continued, medications given per flow sheet  Airway- lma in place, B- b/l breath sounds w/ symmetric chest rise, C- pulseless, receiving compressions  During pulse checks, pt with PEA or asystole with NO cardiac activity on ultrasound, no pericardial effusion, b/l lung sliding w/o evidence of ptx    Neuro- no response to pain, no motor, pupils 2mm and fixed, no gag, no sedation  Abd- soft, nontender  Skin- no rashes, no lacerations  Extremities- no gross deformities, no fistula    pt pronounced after several rounds of ACLS w/o cardiac activity on POCUS and no pulse   TOD: 1252

## 2020-05-07 NOTE — ED PROVIDER NOTE - PHYSICAL EXAMINATION
CONSTITUTIONAL: CPR in progress upon arrival, obtunded with LMA in place.   SKIN: warm, dry  HEAD: Normocephalic; atraumatic.  EYES: normal sclera and conjunctiva   ENT: No nasal discharge; LMA in place upon arrival.   NECK: No obvious signs of deformity, trauma, crepitus.   CARD: PEA arrest, cpr in progress.   RESP: Breath sounds bilaterally with LMA.   ABD: soft, nondistended.   EXT: No clubbing, cyanosis or edema.   LYMPH: No acute cervical adenopathy.  NEURO: Obtunded, cpr in progress, unable to assess.   PSYCH: Unable to assess.

## 2020-06-09 NOTE — DISCHARGE NOTE NURSING/CASE MANAGEMENT/SOCIAL WORK - NSDCVIVACCINE_GEN_ALL_CORE_FT
Per review with Astrid Enamorado MD, may offer 60 minute appointment with Alva Johnson PA-C for evaluation of non-healing abdominal wound.  Please schedule at a time when one of the general surgeons is available for consult as needed (Wednesday or Thursday preferred).  Call prn.  RAMON/betty   No Vaccines Administered.

## 2021-03-19 NOTE — ED PROVIDER NOTE - OBJECTIVE STATEMENT
Blood started Unit  [id number] 79 yo female with a pmh of CHF, COPD, HTN, and mitral regurgitation presents c/o SOB.  pt noted to have difficulty breathing this morning that gradually worsened and became unbearable tonight. EMS recorded 59% O2 sat on ambulance that improved w/ NRB to 99%. denies any other symptoms including fevers, chill, headache, recent illness/travel, cough, n/v/d, abdominal pain, or chest pain. 79 yo female with a pmh of CHF, COPD, HTN, and mitral regurgitation presents c/o SOB.  pt noted to have difficulty breathing this morning that gradually worsened and became unbearable tonight. EMS recorded 59% O2 sat on ambulance that improved w/ NRB to 99%. denies any other symptoms including fevers, chill, headache, recent illness/travel, cough, n/v/d, abdominal pain, or chest pain. Gradual onset, moderate-severe, no known exac/relieving factors

## 2021-09-02 NOTE — ED ADULT TRIAGE NOTE - TEMPERATURE IN FAHRENHEIT (DEGREES F)
Physical Exam:    CONSTITUTIONAL:  Generally well appearing, no acute distress, alert, awake and oriented  HEENT:  Moist mucous membranes, normal voice  PULM:  No accessory muscle use, speaking full sentences  SKIN:  Normal in appearance, normal color
99.7

## 2021-12-08 NOTE — PROGRESS NOTE ADULT - PROVIDER SPECIALTY LIST ADULT
Hospitalist Have Your Skin Lesions Been Treated?: not been treated Is This A New Presentation, Or A Follow-Up?: Skin Lesions

## 2021-12-14 NOTE — ED PROVIDER NOTE - CLINICAL SUMMARY MEDICAL DECISION MAKING FREE TEXT BOX
Addended by: CONSUELO FAY on: 12/14/2021 09:25 AM     Modules accepted: Orders     81yo F history of HTN COPD CHF presenting with shortness of breath that acutely worsened this AM. Per EMS, initial sat 50s on RA, improved on NRB. No chest pain, LE pain/swelling. labs ekg imaging reviewed. pt weaned to NC. will admit

## 2022-12-16 NOTE — PATIENT PROFILE ADULT - SURGICAL SITE INCISION
Left a message for Haily Chapa-transplant coordinator to call back regarding a message we received to try and coordinate an appointment.    no

## 2023-12-27 NOTE — ED PROVIDER NOTE - ATTENDING CONTRIBUTION TO CARE
81yo F history of HTN COPD CHF Hypothyroidism presenting with shortness of breath that acutely worsened this AM. Per EMS, initial sat 50s on RA, improved on NRB. No chest pain, LE pain/swelling.   Constitutional: mildly tachypneic on toxic.   Eyes: PERRLA. Extraocular movements intact, no entrapment. Conjunctiva normal.   ENT: No nasal discharge. Moist mucus membranes.  Neck: Supple, non tender, full range of motion.  CV: RRR no murmurs, rubs, or gallops. +S1S2.   Pulm: mildly tachypneic   Abd: soft NT ND +BS.   Ext: Warm and well perfused x4, moving all extremities, no edema.   Psy: Cooperative, appropriate.   Skin: Warm, dry, no rash  Neuro: CN2-12 grossly intact no sensory or motor deficits throughout, no drift
No

## 2024-11-22 NOTE — H&P ADULT - NSICDXPASTSURGICALHX_GEN_ALL_CORE_FT
Received request via: Patient    Was the patient seen in the last year in this department? Yes    Does the patient have an active prescription (recently filled or refills available) for medication(s) requested? No    Pharmacy Name: [facility]    Does the patient have FDC Plus and need 100-day supply? (This applies to ALL medications) Patient does not have SCP   PAST SURGICAL HISTORY:  History of cholecystectomy

## 2025-01-07 NOTE — ED PROVIDER NOTE - NS ED MD EM SELECTION
[id number] Comprehensive Lot # (Optional): [id number] J-Code:  Detail Level: None Use Enhanced Ndc?: Yes 42590 Billing Preferences: By Number of Body Touches Skyrizi Amount: 150 mg Include J-Code In Bill: No Other Time Frame Value: 4 weeks Expiration Date (Optional): 10/2025 Ndc (150mg/Ml Syringe): 7651-9916-49 Syringe Size Used (Required For Enhanced Ndc): 150 mg/ml Prefilled Syringe Date Of Next Injection: Other Time Frame (Enter Below) Ndc (75mg/0.83ml Syringe): [address]-11 Consent: The risks of pain and injection site reactions were reviewed with the patient prior to the injection.

## 2025-02-01 NOTE — PROGRESS NOTE ADULT - ASSESSMENT
78yo F with Past Medical History UGIB secondary to Peptic Ulcer Disease, Diverticulosis, Hemorrhoids, HTN, COPD, severe MR status post clipping, Chronic Congestive Heart Failure, and Hypothyroidism admitted with dyspnea secondary to acute on chronic valvular congestive heart failure with possible bacterial pneumonia.    Dyspnea secondary to acute on chronic valvular CHF (severe MR) rule out pneumonia: patient reports significant improvement from admission.  She is breathing comfortably on room air.  Cardiology recommendations were reviewed; the patient underwent previous mitral clipping and appears to be euvolemic at this time.  Continue Lasix 40mg PO q24.  For possible pneumonia, complete treatment with Azithromycin 500mg PO q24 for an additional 48 hours.  For COPD component of dyspnea, complete Prednisone taper as advised by pulmonology.  Hypertension: continue Norvasc and Enalapril  Hypothyroidism: continue Synthroid  COPD: no active wheezing appreciated.  Continue Prednisone and nebulizers as directed  GI/DVT prophylaxis    #Progress Note Handoff    Pending:    Future Disposition: Discharge home; time spent = 35 minutes VTE ppx-Patient is on lovenox for DVT  1/26 Lovenox on HOLD for paracentesis on 1/27 1/28 Resumed Lovenox 24 hrs after para